# Patient Record
Sex: FEMALE | Race: WHITE | Employment: UNEMPLOYED | URBAN - METROPOLITAN AREA
[De-identification: names, ages, dates, MRNs, and addresses within clinical notes are randomized per-mention and may not be internally consistent; named-entity substitution may affect disease eponyms.]

---

## 2017-01-13 ENCOUNTER — HOSPITAL ENCOUNTER (OUTPATIENT)
Dept: PHARMACY | Age: 67
Discharge: HOME | End: 2017-01-13
Payer: MEDICARE

## 2017-01-13 DIAGNOSIS — I82.91 CHRONIC EMBOLISM AND THROMBOSIS OF VEIN: ICD-10-CM

## 2017-01-13 LAB
INR BLD: 2.8
PROTIME: 33.2 SECONDS

## 2017-01-13 PROCEDURE — 85610 PROTHROMBIN TIME: CPT

## 2017-01-13 PROCEDURE — G0463 HOSPITAL OUTPT CLINIC VISIT: HCPCS

## 2017-02-10 ENCOUNTER — HOSPITAL ENCOUNTER (OUTPATIENT)
Dept: PHARMACY | Age: 67
Discharge: HOME | End: 2017-02-10
Payer: MEDICARE

## 2017-02-10 DIAGNOSIS — I82.91 CHRONIC EMBOLISM AND THROMBOSIS OF VEIN: ICD-10-CM

## 2017-02-10 LAB
INR BLD: 2.8
PROTIME: 34 SECONDS

## 2017-02-10 PROCEDURE — 85610 PROTHROMBIN TIME: CPT

## 2017-02-10 PROCEDURE — G0463 HOSPITAL OUTPT CLINIC VISIT: HCPCS

## 2017-03-15 ENCOUNTER — ANTI-COAG VISIT (OUTPATIENT)
Dept: PHARMACY | Age: 67
End: 2017-03-15

## 2017-03-15 DIAGNOSIS — I82.91 CHRONIC EMBOLISM AND THROMBOSIS OF VEIN: ICD-10-CM

## 2017-03-24 ENCOUNTER — TELEPHONE (OUTPATIENT)
Dept: PHARMACY | Age: 67
End: 2017-03-24

## 2017-03-24 ENCOUNTER — ANTI-COAG VISIT (OUTPATIENT)
Dept: PHARMACY | Age: 67
End: 2017-03-24

## 2017-03-24 DIAGNOSIS — I82.91 CHRONIC EMBOLISM AND THROMBOSIS OF VEIN: ICD-10-CM

## 2017-03-27 ENCOUNTER — TELEPHONE (OUTPATIENT)
Dept: PHARMACY | Age: 67
End: 2017-03-27

## 2017-03-29 ENCOUNTER — TELEPHONE (OUTPATIENT)
Dept: PHARMACY | Age: 67
End: 2017-03-29

## 2017-03-29 ENCOUNTER — ANTI-COAG VISIT (OUTPATIENT)
Dept: PHARMACY | Age: 67
End: 2017-03-29

## 2017-03-29 DIAGNOSIS — I82.91 CHRONIC EMBOLISM AND THROMBOSIS OF VEIN: ICD-10-CM

## 2017-03-31 ENCOUNTER — HOSPITAL ENCOUNTER (OUTPATIENT)
Dept: PHARMACY | Age: 67
Discharge: HOME | End: 2017-03-31
Payer: MEDICARE

## 2017-03-31 DIAGNOSIS — I82.91 CHRONIC EMBOLISM AND THROMBOSIS OF VEIN: ICD-10-CM

## 2017-03-31 LAB
INR BLD: 3.7
PROTIME: 44 SECONDS

## 2017-03-31 PROCEDURE — 85610 PROTHROMBIN TIME: CPT | Performed by: PHARMACIST

## 2017-03-31 PROCEDURE — G0463 HOSPITAL OUTPT CLINIC VISIT: HCPCS | Performed by: PHARMACIST

## 2017-04-14 ENCOUNTER — HOSPITAL ENCOUNTER (OUTPATIENT)
Dept: PHARMACY | Age: 67
Discharge: HOME | End: 2017-04-14
Payer: MEDICARE

## 2017-04-14 DIAGNOSIS — I82.91 CHRONIC EMBOLISM AND THROMBOSIS OF VEIN: ICD-10-CM

## 2017-04-14 LAB
INR BLD: 2.6
PROTIME: 31.2 SECONDS

## 2017-04-14 PROCEDURE — G0463 HOSPITAL OUTPT CLINIC VISIT: HCPCS | Performed by: PHARMACIST

## 2017-04-14 PROCEDURE — 85610 PROTHROMBIN TIME: CPT | Performed by: PHARMACIST

## 2017-04-27 ENCOUNTER — ANTI-COAG VISIT (OUTPATIENT)
Dept: PHARMACY | Age: 67
End: 2017-04-27

## 2017-04-27 DIAGNOSIS — I82.91 CHRONIC EMBOLISM AND THROMBOSIS OF VEIN: ICD-10-CM

## 2017-05-12 ENCOUNTER — HOSPITAL ENCOUNTER (OUTPATIENT)
Dept: PHARMACY | Age: 67
Discharge: HOME | End: 2017-05-12
Payer: MEDICARE

## 2017-05-12 DIAGNOSIS — I82.91 CHRONIC EMBOLISM AND THROMBOSIS OF VEIN: ICD-10-CM

## 2017-05-12 LAB
INR BLD: 4.1
PROTIME: 49.6 SECONDS

## 2017-05-12 PROCEDURE — 85610 PROTHROMBIN TIME: CPT

## 2017-05-12 PROCEDURE — 99211 OFF/OP EST MAY X REQ PHY/QHP: CPT

## 2017-05-26 ENCOUNTER — HOSPITAL ENCOUNTER (OUTPATIENT)
Dept: PHARMACY | Age: 67
Discharge: HOME | End: 2017-05-26
Payer: MEDICARE

## 2017-05-26 DIAGNOSIS — I82.91 CHRONIC EMBOLISM AND THROMBOSIS OF VEIN: ICD-10-CM

## 2017-05-26 LAB
INR BLD: 2.1
PROTIME: 25.6 SECONDS

## 2017-05-26 PROCEDURE — 85610 PROTHROMBIN TIME: CPT

## 2017-05-26 PROCEDURE — 99211 OFF/OP EST MAY X REQ PHY/QHP: CPT

## 2017-06-12 ENCOUNTER — HOSPITAL ENCOUNTER (OUTPATIENT)
Dept: WOUND CARE | Age: 67
Discharge: HOME | End: 2017-06-12
Payer: MEDICARE

## 2017-06-12 VITALS — BODY MASS INDEX: 15.99 KG/M2 | HEIGHT: 65 IN | WEIGHT: 96 LBS

## 2017-06-12 DIAGNOSIS — L97.521 SKIN ULCER OF LEFT GREAT TOE, LIMITED TO BREAKDOWN OF SKIN (HCC): ICD-10-CM

## 2017-06-12 DIAGNOSIS — L03.032 PARONYCHIA OF GREAT TOE, LEFT: ICD-10-CM

## 2017-06-12 DIAGNOSIS — I73.9 PERIPHERAL VASCULAR DISEASE (HCC): ICD-10-CM

## 2017-06-12 PROBLEM — L03.031 PARONYCHIA OF GREAT TOE, RIGHT: Status: ACTIVE | Noted: 2017-06-12

## 2017-06-12 PROBLEM — L97.511 SKIN ULCER OF RIGHT FOOT, LIMITED TO BREAKDOWN OF SKIN (HCC): Status: ACTIVE | Noted: 2017-06-12

## 2017-06-12 PROCEDURE — 99213 OFFICE O/P EST LOW 20 MIN: CPT

## 2017-06-12 RX ORDER — MULTIVIT-MIN/IRON/FOLIC ACID/K 18-600-40
CAPSULE ORAL
COMMUNITY
End: 2019-01-01 | Stop reason: DRUGHIGH

## 2017-06-16 ENCOUNTER — HOSPITAL ENCOUNTER (OUTPATIENT)
Dept: PHARMACY | Age: 67
Setting detail: THERAPIES SERIES
Discharge: HOME | End: 2017-06-16
Payer: MEDICARE

## 2017-06-16 DIAGNOSIS — I82.91 CHRONIC EMBOLISM AND THROMBOSIS OF VEIN: ICD-10-CM

## 2017-06-16 LAB
INR BLD: 2.7
PROTIME: 32.8 SECONDS

## 2017-06-16 PROCEDURE — 85610 PROTHROMBIN TIME: CPT | Performed by: PHARMACIST

## 2017-06-16 PROCEDURE — 99211 OFF/OP EST MAY X REQ PHY/QHP: CPT | Performed by: PHARMACIST

## 2017-06-26 ENCOUNTER — HOSPITAL ENCOUNTER (OUTPATIENT)
Dept: WOUND CARE | Age: 67
Discharge: HOME | End: 2017-06-26
Payer: MEDICARE

## 2017-06-26 VITALS
TEMPERATURE: 96.1 F | RESPIRATION RATE: 16 BRPM | HEART RATE: 64 BPM | SYSTOLIC BLOOD PRESSURE: 136 MMHG | DIASTOLIC BLOOD PRESSURE: 78 MMHG

## 2017-06-26 DIAGNOSIS — L97.522 NEUROPATHIC ULCER OF TOE OF LEFT FOOT WITH FAT LAYER EXPOSED (HCC): ICD-10-CM

## 2017-06-26 PROCEDURE — 11042 DBRDMT SUBQ TIS 1ST 20SQCM/<: CPT

## 2017-06-26 ASSESSMENT — PAIN SCALES - GENERAL: PAINLEVEL_OUTOF10: 4

## 2017-07-14 ENCOUNTER — HOSPITAL ENCOUNTER (OUTPATIENT)
Dept: PHARMACY | Age: 67
Setting detail: THERAPIES SERIES
Discharge: HOME | End: 2017-07-14
Payer: MEDICARE

## 2017-07-14 DIAGNOSIS — I82.91 CHRONIC EMBOLISM AND THROMBOSIS OF VEIN: ICD-10-CM

## 2017-07-14 LAB
INR BLD: 2.8
PROTIME: 33.3 SECONDS

## 2017-07-14 PROCEDURE — 85610 PROTHROMBIN TIME: CPT

## 2017-07-14 PROCEDURE — 99211 OFF/OP EST MAY X REQ PHY/QHP: CPT

## 2017-07-14 RX ORDER — WARFARIN SODIUM 5 MG/1
TABLET ORAL
Qty: 100 TABLET | Refills: 1 | OUTPATIENT
Start: 2017-07-14 | End: 2018-10-25 | Stop reason: SDUPTHER

## 2017-07-17 ENCOUNTER — HOSPITAL ENCOUNTER (OUTPATIENT)
Dept: WOUND CARE | Age: 67
Discharge: HOME | End: 2017-07-17
Payer: MEDICARE

## 2017-07-17 VITALS
SYSTOLIC BLOOD PRESSURE: 134 MMHG | TEMPERATURE: 96.2 F | DIASTOLIC BLOOD PRESSURE: 74 MMHG | HEIGHT: 65 IN | BODY MASS INDEX: 15.99 KG/M2 | WEIGHT: 96 LBS | RESPIRATION RATE: 18 BRPM | HEART RATE: 67 BPM

## 2017-07-17 PROCEDURE — 11042 DBRDMT SUBQ TIS 1ST 20SQCM/<: CPT

## 2017-07-17 ASSESSMENT — PAIN SCALES - GENERAL: PAINLEVEL_OUTOF10: 0

## 2017-08-07 ENCOUNTER — HOSPITAL ENCOUNTER (OUTPATIENT)
Dept: WOUND CARE | Age: 67
Discharge: HOME | End: 2017-08-07
Payer: MEDICARE

## 2017-08-07 VITALS
RESPIRATION RATE: 18 BRPM | SYSTOLIC BLOOD PRESSURE: 131 MMHG | HEART RATE: 73 BPM | TEMPERATURE: 96.3 F | DIASTOLIC BLOOD PRESSURE: 72 MMHG

## 2017-08-07 PROCEDURE — 99212 OFFICE O/P EST SF 10 MIN: CPT

## 2017-08-11 ENCOUNTER — HOSPITAL ENCOUNTER (OUTPATIENT)
Dept: PHARMACY | Age: 67
Setting detail: THERAPIES SERIES
Discharge: HOME | End: 2017-08-11
Payer: MEDICARE

## 2017-08-11 DIAGNOSIS — I82.91 CHRONIC EMBOLISM AND THROMBOSIS OF VEIN: ICD-10-CM

## 2017-08-11 LAB
INR BLD: 3.2
PROTIME: 38.9 SECONDS

## 2017-08-11 PROCEDURE — 85610 PROTHROMBIN TIME: CPT

## 2017-08-11 PROCEDURE — 99211 OFF/OP EST MAY X REQ PHY/QHP: CPT

## 2017-09-08 ENCOUNTER — HOSPITAL ENCOUNTER (OUTPATIENT)
Dept: PHARMACY | Age: 67
Setting detail: THERAPIES SERIES
Discharge: HOME | End: 2017-09-08
Payer: MEDICARE

## 2017-09-08 DIAGNOSIS — I82.91 CHRONIC EMBOLISM AND THROMBOSIS OF VEIN: ICD-10-CM

## 2017-09-08 LAB
INR BLD: 3.5
PROTIME: 41.8 SECONDS

## 2017-09-08 PROCEDURE — 99211 OFF/OP EST MAY X REQ PHY/QHP: CPT | Performed by: PHARMACIST

## 2017-09-08 PROCEDURE — 85610 PROTHROMBIN TIME: CPT | Performed by: PHARMACIST

## 2017-10-02 ENCOUNTER — TELEPHONE (OUTPATIENT)
Dept: PHARMACY | Age: 67
End: 2017-10-02

## 2017-10-04 ENCOUNTER — HOSPITAL ENCOUNTER (OUTPATIENT)
Dept: PHARMACY | Age: 67
Setting detail: THERAPIES SERIES
Discharge: HOME | End: 2017-10-04
Payer: MEDICARE

## 2017-10-04 DIAGNOSIS — I82.91 CHRONIC EMBOLISM AND THROMBOSIS OF VEIN: ICD-10-CM

## 2017-10-04 LAB
INR BLD: 3.5
PROTIME: 42.3 SECONDS

## 2017-10-04 PROCEDURE — 99211 OFF/OP EST MAY X REQ PHY/QHP: CPT

## 2017-10-04 PROCEDURE — 85610 PROTHROMBIN TIME: CPT

## 2017-10-04 NOTE — PROGRESS NOTES
Ms. Mauri Lozoya is a 69 y.o. y/o female with history of Chronic embolism and thrombosis of vein who presents today for anticoagulation monitoring and adjustment.   INR 3.5 is supratherapeutic for this patient (goal range 2-3) and is reflective of 35 mg TWD  Patient verifies current dosing regimen, patient able to verbally recall dose  Patient reports 0 missed doses since last INR   Patient denies s/sx clotting and/or stroke  Patient denies hematuria, epistaxis, rectal bleeding  Patient denies changes in diet, alcohol, or tobacco use  Reviewed medication list and drug allergies with patient, updated any medication additions or modifications accordingly  Patient also denies any pending medical or dental procedures scheduled at this time  Patient is sick, believes she has pneumonia and may be getting antibiotic tomorrow when she sees her doctor  Patient was instructed to hold today's dose, then decrease weekly dose by 7.1% to 32.5 mg TWD and RTC in 2 weeks

## 2017-10-18 ENCOUNTER — HOSPITAL ENCOUNTER (OUTPATIENT)
Dept: PHARMACY | Age: 67
Setting detail: THERAPIES SERIES
Discharge: HOME | End: 2017-10-18
Payer: MEDICARE

## 2017-10-18 DIAGNOSIS — I82.91 CHRONIC EMBOLISM AND THROMBOSIS OF VEIN: ICD-10-CM

## 2017-10-18 LAB
INR BLD: 2.6
PROTIME: 31.5 SECONDS

## 2017-10-18 PROCEDURE — 85610 PROTHROMBIN TIME: CPT | Performed by: PHARMACIST

## 2017-10-18 PROCEDURE — 99211 OFF/OP EST MAY X REQ PHY/QHP: CPT | Performed by: PHARMACIST

## 2017-11-01 ENCOUNTER — HOSPITAL ENCOUNTER (OUTPATIENT)
Dept: PHARMACY | Age: 67
Setting detail: THERAPIES SERIES
Discharge: HOME | End: 2017-11-01
Payer: MEDICARE

## 2017-11-01 DIAGNOSIS — I82.91 CHRONIC EMBOLISM AND THROMBOSIS OF VEIN: ICD-10-CM

## 2017-11-01 LAB
INR BLD: 2
PROTIME: 24.2 SECONDS

## 2017-11-01 PROCEDURE — 99211 OFF/OP EST MAY X REQ PHY/QHP: CPT | Performed by: PHARMACIST

## 2017-11-01 PROCEDURE — 85610 PROTHROMBIN TIME: CPT | Performed by: PHARMACIST

## 2017-11-09 ENCOUNTER — OFFICE VISIT (OUTPATIENT)
Dept: GASTROENTEROLOGY | Age: 67
End: 2017-11-09

## 2017-11-09 ENCOUNTER — HOSPITAL ENCOUNTER (OUTPATIENT)
Dept: WOMENS IMAGING | Age: 67
Discharge: HOME | End: 2017-11-09
Payer: MEDICARE

## 2017-11-09 VITALS
BODY MASS INDEX: 16.31 KG/M2 | DIASTOLIC BLOOD PRESSURE: 84 MMHG | HEART RATE: 64 BPM | SYSTOLIC BLOOD PRESSURE: 134 MMHG | WEIGHT: 98 LBS

## 2017-11-09 DIAGNOSIS — Z12.11 SPECIAL SCREENING FOR MALIGNANT NEOPLASMS, COLON: ICD-10-CM

## 2017-11-09 DIAGNOSIS — R68.81 EARLY SATIETY: Primary | ICD-10-CM

## 2017-11-09 DIAGNOSIS — R63.4 WEIGHT LOSS: ICD-10-CM

## 2017-11-09 DIAGNOSIS — Z79.01 LONG-TERM (CURRENT) USE OF ANTICOAGULANTS: ICD-10-CM

## 2017-11-09 DIAGNOSIS — Z79.82 ENCOUNTER FOR LONG-TERM (CURRENT) USE OF ASPIRIN: ICD-10-CM

## 2017-11-09 DIAGNOSIS — Z12.31 ENCOUNTER FOR SCREENING MAMMOGRAM FOR BREAST CANCER: ICD-10-CM

## 2017-11-09 PROCEDURE — 99203 OFFICE O/P NEW LOW 30 MIN: CPT | Performed by: INTERNAL MEDICINE

## 2017-11-09 PROCEDURE — 77063 BREAST TOMOSYNTHESIS BI: CPT

## 2017-11-14 NOTE — PROGRESS NOTES
lesions. Warm and dry. Neuro: Grossly intact. Cranial nerves, sensation and reflexes grossly intact. Musculoskeletal:  Unremarkable. Assessment:  1. Early satiety     2. Weight loss     3. Special screening for malignant neoplasms, colon     4. Long-term (current) use of anticoagulants     5. Encounter for long-term (current) use of aspirin         Plan:  Colonoscopy and EGD with small bowel biopsy 11/15/17  Patient has been instructed to hold aspirin for 2 days prior to the procedure. Patient has been instructed to hold Coumadin for 3 days prior to the procedure. Prep:  Keisha Roger, transcribed the above note for Dr Liang Hernandez. Electronically signed by Rebel Santos 11/14/17 10:51 AM        Liang NORMAN, have read and agree with the above documentation.   Electronically signed by Liang Hernandez M.D. 11/15/17 10:26 AM

## 2017-11-15 ENCOUNTER — ANESTHESIA EVENT (OUTPATIENT)
Dept: ENDOSCOPY | Age: 67
End: 2017-11-15
Payer: MEDICARE

## 2017-11-15 ENCOUNTER — ANESTHESIA (OUTPATIENT)
Dept: ENDOSCOPY | Age: 67
End: 2017-11-15
Payer: MEDICARE

## 2017-11-15 ENCOUNTER — HOSPITAL ENCOUNTER (OUTPATIENT)
Age: 67
Setting detail: OUTPATIENT SURGERY
Discharge: HOME | End: 2017-11-15
Attending: INTERNAL MEDICINE | Admitting: INTERNAL MEDICINE
Payer: MEDICARE

## 2017-11-15 VITALS
DIASTOLIC BLOOD PRESSURE: 69 MMHG | SYSTOLIC BLOOD PRESSURE: 118 MMHG | RESPIRATION RATE: 25 BRPM | OXYGEN SATURATION: 96 %

## 2017-11-15 VITALS
HEIGHT: 65 IN | RESPIRATION RATE: 16 BRPM | HEART RATE: 81 BPM | DIASTOLIC BLOOD PRESSURE: 68 MMHG | SYSTOLIC BLOOD PRESSURE: 107 MMHG | TEMPERATURE: 99.6 F | BODY MASS INDEX: 16.33 KG/M2 | WEIGHT: 98 LBS | OXYGEN SATURATION: 95 %

## 2017-11-15 PROCEDURE — 88342 IMHCHEM/IMCYTCHM 1ST ANTB: CPT

## 2017-11-15 PROCEDURE — 3609017100 HC EGD: Performed by: INTERNAL MEDICINE

## 2017-11-15 PROCEDURE — 7100000010 HC PHASE II RECOVERY - FIRST 15 MIN: Performed by: INTERNAL MEDICINE

## 2017-11-15 PROCEDURE — 3700000000 HC ANESTHESIA ATTENDED CARE: Performed by: INTERNAL MEDICINE

## 2017-11-15 PROCEDURE — 3700000001 HC ADD 15 MINUTES (ANESTHESIA): Performed by: INTERNAL MEDICINE

## 2017-11-15 PROCEDURE — 88305 TISSUE EXAM BY PATHOLOGIST: CPT

## 2017-11-15 PROCEDURE — 2580000003 HC RX 258: Performed by: INTERNAL MEDICINE

## 2017-11-15 PROCEDURE — 3609027000 HC COLONOSCOPY: Performed by: INTERNAL MEDICINE

## 2017-11-15 PROCEDURE — 6360000002 HC RX W HCPCS: Performed by: NURSE ANESTHETIST, CERTIFIED REGISTERED

## 2017-11-15 RX ORDER — ONDANSETRON 2 MG/ML
4 INJECTION INTRAMUSCULAR; INTRAVENOUS
Status: DISCONTINUED | OUTPATIENT
Start: 2017-11-15 | End: 2017-11-15 | Stop reason: HOSPADM

## 2017-11-15 RX ORDER — PROPOFOL 10 MG/ML
INJECTION, EMULSION INTRAVENOUS PRN
Status: DISCONTINUED | OUTPATIENT
Start: 2017-11-15 | End: 2017-11-15 | Stop reason: SDUPTHER

## 2017-11-15 RX ORDER — FLUTICASONE FUROATE AND VILANTEROL 100; 25 UG/1; UG/1
POWDER RESPIRATORY (INHALATION) DAILY
COMMUNITY

## 2017-11-15 RX ORDER — SODIUM CHLORIDE 9 MG/ML
INJECTION, SOLUTION INTRAVENOUS CONTINUOUS
Status: DISCONTINUED | OUTPATIENT
Start: 2017-11-15 | End: 2017-11-15 | Stop reason: HOSPADM

## 2017-11-15 RX ADMIN — PROPOFOL 50 MG: 10 INJECTION, EMULSION INTRAVENOUS at 11:51

## 2017-11-15 RX ADMIN — PROPOFOL 20 MG: 10 INJECTION, EMULSION INTRAVENOUS at 11:57

## 2017-11-15 RX ADMIN — PROPOFOL 20 MG: 10 INJECTION, EMULSION INTRAVENOUS at 11:55

## 2017-11-15 RX ADMIN — SODIUM CHLORIDE: 900 INJECTION, SOLUTION INTRAVENOUS at 11:31

## 2017-11-15 RX ADMIN — PROPOFOL 20 MG: 10 INJECTION, EMULSION INTRAVENOUS at 12:10

## 2017-11-15 RX ADMIN — PROPOFOL 20 MG: 10 INJECTION, EMULSION INTRAVENOUS at 11:59

## 2017-11-15 RX ADMIN — SODIUM CHLORIDE: 900 INJECTION, SOLUTION INTRAVENOUS at 12:10

## 2017-11-15 RX ADMIN — PROPOFOL 20 MG: 10 INJECTION, EMULSION INTRAVENOUS at 12:13

## 2017-11-15 RX ADMIN — PROPOFOL 20 MG: 10 INJECTION, EMULSION INTRAVENOUS at 12:03

## 2017-11-15 RX ADMIN — PROPOFOL 20 MG: 10 INJECTION, EMULSION INTRAVENOUS at 12:00

## 2017-11-15 RX ADMIN — PROPOFOL 20 MG: 10 INJECTION, EMULSION INTRAVENOUS at 11:53

## 2017-11-15 RX ADMIN — PROPOFOL 20 MG: 10 INJECTION, EMULSION INTRAVENOUS at 12:05

## 2017-11-15 RX ADMIN — PROPOFOL 20 MG: 10 INJECTION, EMULSION INTRAVENOUS at 12:08

## 2017-11-15 ASSESSMENT — PAIN - FUNCTIONAL ASSESSMENT: PAIN_FUNCTIONAL_ASSESSMENT: 0-10

## 2017-11-15 NOTE — ANESTHESIA POSTPROCEDURE EVALUATION
Department of Anesthesiology  Postprocedure Note    Patient: Shazia Cabrera  MRN: [id number]  YOB: 1950  Date of evaluation: 11/15/2017  Time:  12:24 PM     Procedure Summary     Date:  11/15/17 Room / Location:  [address]    Anesthesia Start:  4262 Anesthesia Stop:  3559    Procedures:       EGD ESOPHAGOGASTRODUODENOSCOPY WITH SMALL BOWEL BIOPSY (N/A )      COLONOSCOPY (N/A ) Diagnosis:  (SCREENING, WT LOSS, EARLY SAT Z12.11, R63.4, R68.81 (, 21709))    Surgeon:  Liang Hernandez MD Responsible Provider:  Gregg Sim CRNA    Anesthesia Type:  MAC ASA Status:  2          Anesthesia Type: MAC    Luz Phase I:      Luz Phase II:      Last vitals: Reviewed and per EMR flowsheets.        Anesthesia Post Evaluation    Patient location during evaluation: PACU  Patient participation: complete - patient participated  Level of consciousness: awake and alert  Pain score: 0  Airway patency: patent  Nausea & Vomiting: no nausea and no vomiting  Complications: no  Cardiovascular status: blood pressure returned to baseline and hemodynamically stable  Respiratory status: acceptable  Hydration status: euvolemic

## 2017-11-15 NOTE — ANESTHESIA PRE PROCEDURE
11/15/17                        Date of last solid food consumption: 11/14/17    BMI:   Wt Readings from Last 3 Encounters:   11/15/17 98 lb (44.5 kg)   11/09/17 98 lb (44.5 kg)   07/17/17 96 lb (43.5 kg)     Body mass index is 16.31 kg/m². CBC:   Lab Results   Component Value Date    WBC 4.4 11/08/2017    RBC 4.91 11/08/2017    HGB 14.7 11/08/2017    HCT 45.5 11/08/2017    MCV 92.9 11/08/2017    RDW 16.7 11/08/2017     11/08/2017       CMP:   Lab Results   Component Value Date     11/08/2017    K 4.7 11/08/2017     11/08/2017    CO2 26 11/08/2017    BUN 28 11/08/2017    CREATININE 0.86 11/08/2017    GFRAA >60.0 11/08/2017    LABGLOM >60.0 11/08/2017    GLUCOSE 93 11/08/2017    PROT 7.4 11/08/2017    CALCIUM 9.2 11/08/2017    BILITOT 0.2 11/08/2017    ALKPHOS 72 11/08/2017    AST 21 11/08/2017    ALT 18 11/08/2017       POC Tests: No results for input(s): POCGLU, POCNA, POCK, POCCL, POCBUN, POCHEMO, POCHCT in the last 72 hours.     Coags:   Lab Results   Component Value Date    PROTIME 24.2 11/01/2017    PROTIME 24.8 09/22/2016    INR 2 11/01/2017    APTT 52.3 05/24/2016       HCG (If Applicable): No results found for: PREGTESTUR, PREGSERUM, HCG, HCGQUANT     ABGs:   Lab Results   Component Value Date    PHART 7.381 07/18/2015    PO2ART 61 07/18/2015    QVF1LEU 45 07/18/2015    PJR6BGW 26.9 07/18/2015    BEART 1 07/18/2015    K8TKCEAQ 90 07/18/2015        Type & Screen (If Applicable):  No results found for: LABABO, [address]    Anesthesia Evaluation  Patient summary reviewed and Nursing notes reviewed  Airway: Mallampati: II  TM distance: >3 FB   Neck ROM: full  Mouth opening: > = 3 FB Dental: normal exam         Pulmonary:normal exam    (+) COPD:  asthma:                            Cardiovascular:Negative CV ROS                      Neuro/Psych:   Negative Neuro/Psych ROS              GI/Hepatic/Renal: Neg GI/Hepatic/Renal ROS            Endo/Other:    (+) hyperthyroidism::., .

## 2017-11-21 ENCOUNTER — HOSPITAL ENCOUNTER (OUTPATIENT)
Dept: ULTRASOUND IMAGING | Age: 67
End: 2017-11-21
Payer: MEDICARE

## 2017-11-21 ENCOUNTER — HOSPITAL ENCOUNTER (OUTPATIENT)
Dept: WOMENS IMAGING | Age: 67
Discharge: HOME | End: 2017-11-21
Payer: MEDICARE

## 2017-11-21 DIAGNOSIS — R92.8 ABNORMAL MAMMOGRAM: ICD-10-CM

## 2017-11-21 PROCEDURE — G0206 DX MAMMO INCL CAD UNI: HCPCS

## 2017-11-27 ENCOUNTER — HOSPITAL ENCOUNTER (OUTPATIENT)
Dept: PHARMACY | Age: 67
Setting detail: THERAPIES SERIES
Discharge: HOME | End: 2017-11-27
Payer: MEDICARE

## 2017-11-27 DIAGNOSIS — I82.91 CHRONIC EMBOLISM AND THROMBOSIS OF VEIN: ICD-10-CM

## 2017-11-27 LAB
INR BLD: 2.5
PROTIME: 30 SECONDS

## 2017-11-27 PROCEDURE — 99211 OFF/OP EST MAY X REQ PHY/QHP: CPT

## 2017-11-27 PROCEDURE — 85610 PROTHROMBIN TIME: CPT

## 2017-11-27 NOTE — PROGRESS NOTES
Ms. Triston Bernstein is a 69 y.o. y/o female with history of chronic embolism and thrombosis who presents today for anticoagulation monitoring and adjustment.   INR 2.5 is therapeutic for this patient (goal range 2-3) and is reflective of 32.5 mg TWD  Patient verifies current dosing regimen, patient able to verbally recall dose  Patient reports 0  missed doses since last INR   Patient denies s/sx clotting and/or stroke  Patient denies hematuria, epistaxis, rectal bleeding  Patient denies changes in diet, alcohol, or tobacco use  Reviewed medication list and drug allergies with patient, updated any medication additions or modifications accordingly  Patient also denies any pending medical or dental procedures scheduled at this time  Patient was instructed to continue 32.5 mg TWD and RTC 3 weeks

## 2017-12-18 ENCOUNTER — HOSPITAL ENCOUNTER (OUTPATIENT)
Dept: PHARMACY | Age: 67
Setting detail: THERAPIES SERIES
Discharge: HOME | End: 2017-12-18
Payer: MEDICARE

## 2017-12-18 DIAGNOSIS — I82.91 CHRONIC EMBOLISM AND THROMBOSIS OF VEIN: ICD-10-CM

## 2017-12-18 LAB
INR BLD: 2.5
PROTIME: 29.4 SECONDS

## 2017-12-18 PROCEDURE — 85610 PROTHROMBIN TIME: CPT

## 2017-12-18 PROCEDURE — 99211 OFF/OP EST MAY X REQ PHY/QHP: CPT

## 2018-01-15 ENCOUNTER — TELEPHONE (OUTPATIENT)
Dept: PHARMACY | Age: 68
End: 2018-01-15

## 2018-01-15 ENCOUNTER — APPOINTMENT (OUTPATIENT)
Dept: PHARMACY | Age: 68
End: 2018-01-15
Payer: MEDICARE

## 2018-01-15 DIAGNOSIS — I82.91 CHRONIC EMBOLISM AND THROMBOSIS OF VEIN: ICD-10-CM

## 2018-01-23 ENCOUNTER — HOSPITAL ENCOUNTER (OUTPATIENT)
Dept: PHARMACY | Age: 68
Setting detail: THERAPIES SERIES
Discharge: HOME | End: 2018-01-23
Payer: MEDICARE

## 2018-01-23 DIAGNOSIS — I82.91 CHRONIC EMBOLISM AND THROMBOSIS OF VEIN: ICD-10-CM

## 2018-01-23 LAB
INR BLD: 2.9
PROTIME: 35.2 SECONDS

## 2018-01-23 PROCEDURE — 99211 OFF/OP EST MAY X REQ PHY/QHP: CPT | Performed by: PHARMACIST

## 2018-01-23 PROCEDURE — 85610 PROTHROMBIN TIME: CPT | Performed by: PHARMACIST

## 2018-02-20 ENCOUNTER — TELEPHONE (OUTPATIENT)
Dept: PHARMACY | Age: 68
End: 2018-02-20

## 2018-02-20 DIAGNOSIS — I82.91 CHRONIC EMBOLISM AND THROMBOSIS OF VEIN: ICD-10-CM

## 2018-02-28 ENCOUNTER — TELEPHONE (OUTPATIENT)
Dept: PHARMACY | Age: 68
End: 2018-02-28

## 2018-02-28 DIAGNOSIS — I82.91 CHRONIC EMBOLISM AND THROMBOSIS OF VEIN: ICD-10-CM

## 2018-03-07 ENCOUNTER — TELEPHONE (OUTPATIENT)
Dept: PHARMACY | Age: 68
End: 2018-03-07

## 2018-03-09 ENCOUNTER — HOSPITAL ENCOUNTER (OUTPATIENT)
Dept: PHARMACY | Age: 68
Setting detail: THERAPIES SERIES
Discharge: HOME | End: 2018-03-09
Payer: MEDICARE

## 2018-03-09 DIAGNOSIS — I82.91 CHRONIC EMBOLISM AND THROMBOSIS OF VEIN: ICD-10-CM

## 2018-03-09 LAB
INR BLD: 2.9
PROTIME: 34.3 SECONDS

## 2018-03-09 PROCEDURE — 85610 PROTHROMBIN TIME: CPT | Performed by: PHARMACIST

## 2018-03-09 PROCEDURE — 99211 OFF/OP EST MAY X REQ PHY/QHP: CPT | Performed by: PHARMACIST

## 2018-03-16 ENCOUNTER — HOSPITAL ENCOUNTER (EMERGENCY)
Age: 68
Discharge: HOME | End: 2018-03-16
Attending: EMERGENCY MEDICINE
Payer: MEDICARE

## 2018-03-16 ENCOUNTER — APPOINTMENT (OUTPATIENT)
Dept: GENERAL RADIOLOGY | Age: 68
End: 2018-03-16
Payer: MEDICARE

## 2018-03-16 VITALS
RESPIRATION RATE: 14 BRPM | DIASTOLIC BLOOD PRESSURE: 68 MMHG | WEIGHT: 96 LBS | HEART RATE: 76 BPM | HEIGHT: 65 IN | BODY MASS INDEX: 15.99 KG/M2 | TEMPERATURE: 97.3 F | OXYGEN SATURATION: 100 % | SYSTOLIC BLOOD PRESSURE: 101 MMHG

## 2018-03-16 DIAGNOSIS — R19.7 DIARRHEA, UNSPECIFIED TYPE: Primary | ICD-10-CM

## 2018-03-16 DIAGNOSIS — J44.1 COPD EXACERBATION (HCC): ICD-10-CM

## 2018-03-16 LAB
ALBUMIN SERPL-MCNC: 2.6 G/DL (ref 3.9–4.9)
ALP BLD-CCNC: 68 U/L (ref 40–130)
ALT SERPL-CCNC: 16 U/L (ref 0–33)
ANION GAP SERPL CALCULATED.3IONS-SCNC: 10 MEQ/L (ref 7–13)
AST SERPL-CCNC: 36 U/L (ref 0–35)
BACTERIA: NORMAL /HPF
BASOPHILS ABSOLUTE: 0.1 K/UL (ref 0–0.2)
BASOPHILS RELATIVE PERCENT: 1.3 %
BILIRUB SERPL-MCNC: 0.3 MG/DL (ref 0–1.2)
BILIRUBIN DIRECT: 0.2 MG/DL (ref 0–0.3)
BILIRUBIN URINE: ABNORMAL
BILIRUBIN, INDIRECT: 0.1 MG/DL (ref 0–0.6)
BLOOD, URINE: ABNORMAL
BUN BLDV-MCNC: 32 MG/DL (ref 8–23)
CALCIUM SERPL-MCNC: 8.3 MG/DL (ref 8.6–10.2)
CASTS: NORMAL /LPF
CHLORIDE BLD-SCNC: 97 MEQ/L (ref 98–107)
CLARITY: CLEAR
CO2: 30 MEQ/L (ref 22–29)
COLOR: ABNORMAL
CREAT SERPL-MCNC: 1.09 MG/DL (ref 0.5–0.9)
EOSINOPHILS ABSOLUTE: 0 K/UL (ref 0–0.7)
EOSINOPHILS RELATIVE PERCENT: 0 %
EPITHELIAL CELLS, UA: NORMAL /HPF
GFR AFRICAN AMERICAN: >60
GFR NON-AFRICAN AMERICAN: 49.6
GLUCOSE BLD-MCNC: 113 MG/DL (ref 74–109)
GLUCOSE URINE: 100 MG/DL
HCT VFR BLD CALC: 44.9 % (ref 37–47)
HEMOGLOBIN: 15.5 G/DL (ref 12–16)
KETONES, URINE: ABNORMAL MG/DL
LACTIC ACID: 1.4 MMOL/L (ref 0.5–2.2)
LEUKOCYTE ESTERASE, URINE: NEGATIVE
LIPASE: 32 U/L (ref 13–60)
LYMPHOCYTES ABSOLUTE: 0.8 K/UL (ref 1–4.8)
LYMPHOCYTES RELATIVE PERCENT: 16.5 %
MAGNESIUM: 2.1 MG/DL (ref 1.7–2.3)
MCH RBC QN AUTO: 32.6 PG (ref 27–31.3)
MCHC RBC AUTO-ENTMCNC: 34.4 % (ref 33–37)
MCV RBC AUTO: 94.6 FL (ref 82–100)
MONOCYTES ABSOLUTE: 0.5 K/UL (ref 0.2–0.8)
MONOCYTES RELATIVE PERCENT: 9.6 %
MUCUS: PRESENT
NEUTROPHILS ABSOLUTE: 3.7 K/UL (ref 1.4–6.5)
NEUTROPHILS RELATIVE PERCENT: 72.6 %
NITRITE, URINE: NEGATIVE
PDW BLD-RTO: 16.4 % (ref 11.5–14.5)
PH UA: 6 (ref 5–9)
PLATELET # BLD: 288 K/UL (ref 130–400)
POTASSIUM SERPL-SCNC: 4.2 MEQ/L (ref 3.5–5.1)
PROTEIN UA: >=300 MG/DL
RAPID INFLUENZA  B AGN: NEGATIVE
RAPID INFLUENZA A AGN: NEGATIVE
RBC # BLD: 4.75 M/UL (ref 4.2–5.4)
RBC UA: NORMAL /HPF (ref 0–2)
SODIUM BLD-SCNC: 137 MEQ/L (ref 132–144)
SPECIFIC GRAVITY UA: 1.03 (ref 1–1.03)
TOTAL PROTEIN: 6.6 G/DL (ref 6.4–8.1)
URINE REFLEX TO CULTURE: YES
UROBILINOGEN, URINE: 0.2 E.U./DL
WBC # BLD: 5.1 K/UL (ref 4.8–10.8)
WBC UA: NORMAL /HPF (ref 0–5)

## 2018-03-16 PROCEDURE — 83605 ASSAY OF LACTIC ACID: CPT

## 2018-03-16 PROCEDURE — 80048 BASIC METABOLIC PNL TOTAL CA: CPT

## 2018-03-16 PROCEDURE — 94640 AIRWAY INHALATION TREATMENT: CPT

## 2018-03-16 PROCEDURE — 6370000000 HC RX 637 (ALT 250 FOR IP): Performed by: EMERGENCY MEDICINE

## 2018-03-16 PROCEDURE — 6360000002 HC RX W HCPCS: Performed by: EMERGENCY MEDICINE

## 2018-03-16 PROCEDURE — 87086 URINE CULTURE/COLONY COUNT: CPT

## 2018-03-16 PROCEDURE — 96361 HYDRATE IV INFUSION ADD-ON: CPT

## 2018-03-16 PROCEDURE — 96374 THER/PROPH/DIAG INJ IV PUSH: CPT

## 2018-03-16 PROCEDURE — 81001 URINALYSIS AUTO W/SCOPE: CPT

## 2018-03-16 PROCEDURE — 83735 ASSAY OF MAGNESIUM: CPT

## 2018-03-16 PROCEDURE — 36415 COLL VENOUS BLD VENIPUNCTURE: CPT

## 2018-03-16 PROCEDURE — 86403 PARTICLE AGGLUT ANTBDY SCRN: CPT

## 2018-03-16 PROCEDURE — 85025 COMPLETE CBC W/AUTO DIFF WBC: CPT

## 2018-03-16 PROCEDURE — 99284 EMERGENCY DEPT VISIT MOD MDM: CPT

## 2018-03-16 PROCEDURE — 87040 BLOOD CULTURE FOR BACTERIA: CPT

## 2018-03-16 PROCEDURE — 2580000003 HC RX 258: Performed by: EMERGENCY MEDICINE

## 2018-03-16 PROCEDURE — 71045 X-RAY EXAM CHEST 1 VIEW: CPT

## 2018-03-16 PROCEDURE — 83690 ASSAY OF LIPASE: CPT

## 2018-03-16 PROCEDURE — 80076 HEPATIC FUNCTION PANEL: CPT

## 2018-03-16 RX ORDER — AZITHROMYCIN 250 MG/1
TABLET, FILM COATED ORAL
Qty: 1 PACKET | Refills: 0 | Status: ON HOLD | OUTPATIENT
Start: 2018-03-16 | End: 2019-01-01 | Stop reason: HOSPADM

## 2018-03-16 RX ORDER — IPRATROPIUM BROMIDE AND ALBUTEROL SULFATE 2.5; .5 MG/3ML; MG/3ML
1 SOLUTION RESPIRATORY (INHALATION) PRN
Status: DISCONTINUED | OUTPATIENT
Start: 2018-03-16 | End: 2018-03-16 | Stop reason: HOSPADM

## 2018-03-16 RX ORDER — 0.9 % SODIUM CHLORIDE 0.9 %
500 INTRAVENOUS SOLUTION INTRAVENOUS ONCE
Status: COMPLETED | OUTPATIENT
Start: 2018-03-16 | End: 2018-03-16

## 2018-03-16 RX ORDER — SODIUM CHLORIDE 9 MG/ML
INJECTION, SOLUTION INTRAVENOUS CONTINUOUS
Status: DISCONTINUED | OUTPATIENT
Start: 2018-03-16 | End: 2018-03-16 | Stop reason: HOSPADM

## 2018-03-16 RX ORDER — PREDNISONE 20 MG/1
40 TABLET ORAL DAILY
Qty: 10 TABLET | Refills: 0 | Status: SHIPPED | OUTPATIENT
Start: 2018-03-16 | End: 2018-03-21

## 2018-03-16 RX ORDER — METHYLPREDNISOLONE SODIUM SUCCINATE 125 MG/2ML
125 INJECTION, POWDER, LYOPHILIZED, FOR SOLUTION INTRAMUSCULAR; INTRAVENOUS ONCE
Status: COMPLETED | OUTPATIENT
Start: 2018-03-16 | End: 2018-03-16

## 2018-03-16 RX ADMIN — SODIUM CHLORIDE 500 ML: 9 INJECTION, SOLUTION INTRAVENOUS at 14:02

## 2018-03-16 RX ADMIN — IPRATROPIUM BROMIDE AND ALBUTEROL SULFATE 1 AMPULE: .5; 3 SOLUTION RESPIRATORY (INHALATION) at 13:34

## 2018-03-16 RX ADMIN — METHYLPREDNISOLONE SODIUM SUCCINATE 125 MG: 125 INJECTION, POWDER, FOR SOLUTION INTRAMUSCULAR; INTRAVENOUS at 14:02

## 2018-03-16 RX ADMIN — SODIUM CHLORIDE: 9 INJECTION, SOLUTION INTRAVENOUS at 14:03

## 2018-03-16 RX ADMIN — IPRATROPIUM BROMIDE AND ALBUTEROL SULFATE 1 AMPULE: .5; 3 SOLUTION RESPIRATORY (INHALATION) at 13:27

## 2018-03-16 ASSESSMENT — ENCOUNTER SYMPTOMS
SHORTNESS OF BREATH: 1
EYE PAIN: 0
DIARRHEA: 1
CHEST TIGHTNESS: 0
VOMITING: 0
COUGH: 1
SORE THROAT: 0
ABDOMINAL PAIN: 0
WHEEZING: 1
NAUSEA: 0

## 2018-03-16 ASSESSMENT — PAIN SCALES - GENERAL: PAINLEVEL_OUTOF10: 5

## 2018-03-16 ASSESSMENT — PAIN DESCRIPTION - LOCATION: LOCATION: LEG

## 2018-03-16 ASSESSMENT — PAIN DESCRIPTION - ORIENTATION: ORIENTATION: LEFT

## 2018-03-16 ASSESSMENT — PAIN DESCRIPTION - PAIN TYPE: TYPE: ACUTE PAIN

## 2018-03-16 NOTE — ED PROVIDER NOTES
[facility]  eMERGENCY dEPARTMENT eNCOUnter      Pt Name: Esther Hammond  MRN: [id number]  [place] 1950  Date of evaluation: 3/16/2018  Provider: Kalyani Shaikh, [address] [place] [address]       Chief Complaint   Patient presents with    Diarrhea     shaking x 2 weeks, diarrhea x 2 days, confusion x 2 days. Stomach flu went through the house recently. HISTORY OF PRESENT ILLNESS   (Location/Symptom, Timing/Onset, Context/Setting, Quality, Duration, Modifying Factors, Severity)  Note limiting factors. Esther Hammond is a 77 y.o. female who presents to the emergency department . Patient presents with poor appetite, shaking for 2 weeks. For the last couple of days she has had diarrhea and confusion. She also has a productive cough and is short of breath. Her family states that she's always coughing and has COPD. She has had pneumonia several times. HPI    Nursing Notes were reviewed. REVIEW OF SYSTEMS    (2-9 systems for level 4, 10 or more for level 5)     Review of Systems   Constitutional: Positive for activity change and appetite change. Negative for fatigue and fever. HENT: Positive for congestion. Negative for sore throat. Eyes: Negative for pain and visual disturbance. Respiratory: Positive for cough, shortness of breath and wheezing. Negative for chest tightness. Cardiovascular: Negative for chest pain. Gastrointestinal: Positive for diarrhea. Negative for abdominal pain, nausea and vomiting. Endocrine: Negative for polydipsia. Genitourinary: Negative for flank pain and urgency. Musculoskeletal: Negative for gait problem and neck stiffness. Skin: Negative for rash. Neurological: Negative for weakness, light-headedness and headaches. Psychiatric/Behavioral: Positive for confusion. Negative for sleep disturbance. Except as noted above the remainder of the review of systems was reviewed and negative.        PAST MEDICAL HISTORY     Past Medical History: Diagnosis Date    COPD (chronic obstructive pulmonary disease) ([facility] [address]     Hx of blood clots     Hyperlipidemia     Hyperthyroidism          SURGICAL HISTORY       Past Surgical History:   Procedure Laterality Date    DENTAL SURGERY      HYSTERECTOMY      [place] COLON CA SCRN NOT  [address] IND N/A 11/15/2017    COLONOSCOPY performed by Estrellita Arana MD at . [address] ESOPHAGOGASTRODUODENOSCOPY TRANSORAL DIAGNOSTIC N/A 11/15/2017    EGD ESOPHAGOGASTRODUODENOSCOPY WITH SMALL BOWEL BIOPSY performed by Estrellita Arana MD at [facility],[address]      L leg DVT         CURRENT MEDICATIONS       Previous Medications    ALBUTEROL (PROAIR HFA) 108 (90 BASE) MCG/ACT INHALER    Inhale 2 puffs into the lungs every 6 hours as needed for Wheezing. ALBUTEROL (PROVENTIL) (2.5 MG/3ML) 0.083% NEBULIZER SOLUTION    Take 2.5 mg by nebulization every 6 hours as needed for Wheezing. AMLODIPINE (NORVASC) 10 MG TABLET    Take 10 mg by mouth daily. ASPIRIN 81 MG TABLET    Take 81 mg by mouth daily. ATENOLOL (TENORMIN) 50 MG TABLET    Take 50 mg by mouth daily. CHOLECALCIFEROL (VITAMIN D) 2000 UNITS CAPS CAPSULE    Take by mouth Indications: 89946 units Thursday only    CLOPIDOGREL (PLAVIX) 75 MG TABLET    Take 75 mg by mouth daily. FLUTICASONE-VILANTEROL (BREO ELLIPTA) 100-25 MCG/INH AEPB INHALER    Inhale into the lungs daily    LORATADINE (CLARITIN) 10 MG TABLET    Take 10 mg by mouth daily. ROSUVASTATIN (CRESTOR) 20 MG TABLET    Take 20 mg by mouth daily. TRIAMCINOLONE (ARISTOCORT) 0.5 % CREAM    Apply  topically 3 times daily. Apply topically 3 times daily. UMECLIDINIUM BROMIDE 62.5 MCG/INH AEPB    Inhale into the lungs    WARFARIN (COUMADIN) 5 MG TABLET    Take as directed by [facility] EMERGENCY [facility] AT [facility]. Quantity equals 90 day supply. WARFARIN (COUMADIN) 6 MG TABLET    Take 6 mg by mouth daily.        ALLERGIES     Patient has no known is not diaphoretic. Psychiatric: She has a normal mood and affect. Her behavior is normal. Judgment and thought content normal.       DIAGNOSTIC RESULTS     EKG: All EKG's are interpreted by the Emergency Department Physician who either signs or Co-signs this chart in the absence of a cardiologist.        RADIOLOGY:   Non-plain film images such as CT, Ultrasound and MRI are read by the radiologist. Plain radiographic images are visualized and preliminarily interpreted by the emergency physician with the below findings:    Chest x-ray shows no infiltrate. Evidence of COPD    Interpretation per the Radiologist below, if available at the time of this note:    XR CHEST PORTABLE    (Results Pending)         ED BEDSIDE ULTRASOUND:   Performed by ED Physician - none    LABS:  Labs Reviewed   C DIFF TOXIN B BY RT PCR   RAPID INFLUENZA A/B ANTIGENS   CULTURE BLOOD #2   CULTURE BLOOD #1   HEPATIC FUNCTION PANEL   CBC WITH AUTO DIFFERENTIAL   BASIC METABOLIC PANEL   URINE RT REFLEX TO CULTURE   MAGNESIUM   LIPASE   LACTIC ACID, PLASMA       All other labs were within normal range or not returned as of this dictation. EMERGENCY DEPARTMENT COURSE and DIFFERENTIAL DIAGNOSIS/MDM:   Vitals:    Vitals:    03/16/18 1318 03/16/18 1319 03/16/18 1329 03/16/18 1335   BP:       Pulse:   81 76   Resp:   18 14   Temp:       TempSrc:       SpO2: (!) 88% 100% 100% 100%   Weight:       Height:           Patient presented with shortness of breath. After DuoNeb's her lungs sounded much better. Chest x-ray did not show any infiltrates. She has had a poor appetite recently and some diarrhea. I did give her IV hydration and general she is feeling better. She was less shaky. She is able to eat and drink. Patient will be discharged home    MDM      REASSESSMENT     ED Course          CRITICAL CARE TIME   Total Critical Care time was 0 minutes, excluding separately reportable procedures.   There was a high probability of clinically

## 2018-03-18 LAB — URINE CULTURE, ROUTINE: NORMAL

## 2018-03-21 LAB
BLOOD CULTURE, ROUTINE: NORMAL
CULTURE, BLOOD 2: NORMAL

## 2018-04-01 ENCOUNTER — APPOINTMENT (OUTPATIENT)
Dept: ULTRASOUND IMAGING | Age: 68
End: 2018-04-01
Payer: MEDICARE

## 2018-04-01 ENCOUNTER — HOSPITAL ENCOUNTER (EMERGENCY)
Age: 68
Discharge: HOME | End: 2018-04-01
Attending: EMERGENCY MEDICINE
Payer: MEDICARE

## 2018-04-01 VITALS
HEIGHT: 65 IN | TEMPERATURE: 97.3 F | RESPIRATION RATE: 16 BRPM | OXYGEN SATURATION: 89 % | HEART RATE: 79 BPM | SYSTOLIC BLOOD PRESSURE: 140 MMHG | DIASTOLIC BLOOD PRESSURE: 76 MMHG | WEIGHT: 98 LBS | BODY MASS INDEX: 16.33 KG/M2

## 2018-04-01 DIAGNOSIS — M79.89 LEG SWELLING: Primary | ICD-10-CM

## 2018-04-01 LAB
ALBUMIN SERPL-MCNC: 2.7 G/DL (ref 3.9–4.9)
ALP BLD-CCNC: 56 U/L (ref 40–130)
ALT SERPL-CCNC: 22 U/L (ref 0–33)
ANION GAP SERPL CALCULATED.3IONS-SCNC: 10 MEQ/L (ref 7–13)
AST SERPL-CCNC: 24 U/L (ref 0–35)
ATYPICAL LYMPHOCYTE RELATIVE PERCENT: 1 %
BANDED NEUTROPHILS RELATIVE PERCENT: 2 % (ref 5–11)
BASOPHILS ABSOLUTE: 0.1 K/UL (ref 0–0.2)
BASOPHILS RELATIVE PERCENT: 4 %
BILIRUB SERPL-MCNC: 0.1 MG/DL (ref 0–1.2)
BUN BLDV-MCNC: 21 MG/DL (ref 8–23)
CALCIUM SERPL-MCNC: 7.5 MG/DL (ref 8.6–10.2)
CHLORIDE BLD-SCNC: 107 MEQ/L (ref 98–107)
CO2: 27 MEQ/L (ref 22–29)
CREAT SERPL-MCNC: 1.27 MG/DL (ref 0.5–0.9)
EOSINOPHILS ABSOLUTE: 0.1 K/UL (ref 0–0.7)
EOSINOPHILS RELATIVE PERCENT: 2 %
GFR AFRICAN AMERICAN: 50.3
GFR NON-AFRICAN AMERICAN: 41.6
GLOBULIN: 2.5 G/DL (ref 2.3–3.5)
GLUCOSE BLD-MCNC: 96 MG/DL (ref 74–109)
HCT VFR BLD CALC: 38.9 % (ref 37–47)
HEMOGLOBIN: 13.2 G/DL (ref 12–16)
INR BLD: 1.2
LYMPHOCYTES ABSOLUTE: 0.9 K/UL (ref 1–4.8)
LYMPHOCYTES RELATIVE PERCENT: 29 %
MCH RBC QN AUTO: 32.1 PG (ref 27–31.3)
MCHC RBC AUTO-ENTMCNC: 33.8 % (ref 33–37)
MCV RBC AUTO: 94.7 FL (ref 82–100)
MONOCYTES ABSOLUTE: 0.3 K/UL (ref 0.2–0.8)
MONOCYTES RELATIVE PERCENT: 8.6 %
NEUTROPHILS ABSOLUTE: 1.7 K/UL (ref 1.4–6.5)
NEUTROPHILS RELATIVE PERCENT: 53 %
PDW BLD-RTO: 17.4 % (ref 11.5–14.5)
PLATELET # BLD: 138 K/UL (ref 130–400)
POTASSIUM SERPL-SCNC: 4.1 MEQ/L (ref 3.5–5.1)
PROMONOCYTES: 1 %
PROTHROMBIN TIME: 13.1 SEC (ref 8.1–13.7)
RBC # BLD: 4.11 M/UL (ref 4.2–5.4)
RBC # BLD: NORMAL 10*6/UL
SMUDGE CELLS: 1
SODIUM BLD-SCNC: 144 MEQ/L (ref 132–144)
TOTAL PROTEIN: 5.2 G/DL (ref 6.4–8.1)
WBC # BLD: 3.1 K/UL (ref 4.8–10.8)

## 2018-04-01 PROCEDURE — 93971 EXTREMITY STUDY: CPT

## 2018-04-01 PROCEDURE — 85025 COMPLETE CBC W/AUTO DIFF WBC: CPT

## 2018-04-01 PROCEDURE — 99283 EMERGENCY DEPT VISIT LOW MDM: CPT

## 2018-04-01 PROCEDURE — 36415 COLL VENOUS BLD VENIPUNCTURE: CPT

## 2018-04-01 PROCEDURE — 85610 PROTHROMBIN TIME: CPT

## 2018-04-01 PROCEDURE — 80053 COMPREHEN METABOLIC PANEL: CPT

## 2018-04-01 RX ORDER — AMOXICILLIN AND CLAVULANATE POTASSIUM 875; 125 MG/1; MG/1
1 TABLET, FILM COATED ORAL 2 TIMES DAILY
Qty: 20 TABLET | Refills: 0 | Status: SHIPPED | OUTPATIENT
Start: 2018-04-01 | End: 2018-04-11

## 2018-04-01 ASSESSMENT — ENCOUNTER SYMPTOMS
PHOTOPHOBIA: 0
CHEST TIGHTNESS: 0
ABDOMINAL PAIN: 0
SORE THROAT: 0
SHORTNESS OF BREATH: 0
COUGH: 0
ABDOMINAL DISTENTION: 0
EYE DISCHARGE: 0
WHEEZING: 0
VOMITING: 0

## 2018-04-06 ENCOUNTER — TELEPHONE (OUTPATIENT)
Dept: PHARMACY | Age: 68
End: 2018-04-06

## 2018-04-06 DIAGNOSIS — I82.91 CHRONIC EMBOLISM AND THROMBOSIS OF VEIN: ICD-10-CM

## 2018-04-13 ENCOUNTER — HOSPITAL ENCOUNTER (OUTPATIENT)
Dept: PHARMACY | Age: 68
Setting detail: THERAPIES SERIES
Discharge: HOME | End: 2018-04-13
Payer: MEDICARE

## 2018-04-13 DIAGNOSIS — I82.91 CHRONIC EMBOLISM AND THROMBOSIS OF VEIN: ICD-10-CM

## 2018-04-13 LAB
INR BLD: 2.3
PROTIME: 27.6 SECONDS

## 2018-04-13 PROCEDURE — 85610 PROTHROMBIN TIME: CPT

## 2018-04-13 PROCEDURE — 99211 OFF/OP EST MAY X REQ PHY/QHP: CPT

## 2018-05-11 ENCOUNTER — HOSPITAL ENCOUNTER (OUTPATIENT)
Dept: PHARMACY | Age: 68
Setting detail: THERAPIES SERIES
Discharge: HOME | End: 2018-05-11
Payer: MEDICARE

## 2018-05-11 DIAGNOSIS — I82.91 CHRONIC EMBOLISM AND THROMBOSIS OF VEIN: ICD-10-CM

## 2018-05-11 LAB
INR BLD: 1.5
PROTIME: 17.9 SECONDS

## 2018-05-11 PROCEDURE — 99211 OFF/OP EST MAY X REQ PHY/QHP: CPT | Performed by: PHARMACIST

## 2018-05-11 PROCEDURE — 85610 PROTHROMBIN TIME: CPT | Performed by: PHARMACIST

## 2018-05-25 ENCOUNTER — HOSPITAL ENCOUNTER (OUTPATIENT)
Dept: PHARMACY | Age: 68
Setting detail: THERAPIES SERIES
Discharge: HOME | End: 2018-05-25
Payer: MEDICARE

## 2018-05-25 DIAGNOSIS — I82.91 CHRONIC EMBOLISM AND THROMBOSIS OF VEIN: ICD-10-CM

## 2018-05-25 LAB
INR BLD: 1.7
PROTIME: 20.7 SECONDS

## 2018-05-25 PROCEDURE — 85610 PROTHROMBIN TIME: CPT

## 2018-05-25 PROCEDURE — 99211 OFF/OP EST MAY X REQ PHY/QHP: CPT

## 2018-06-22 ENCOUNTER — HOSPITAL ENCOUNTER (OUTPATIENT)
Dept: PHARMACY | Age: 68
Setting detail: THERAPIES SERIES
Discharge: HOME | End: 2018-06-22
Payer: MEDICARE

## 2018-06-22 DIAGNOSIS — I82.91 CHRONIC EMBOLISM AND THROMBOSIS OF VEIN: ICD-10-CM

## 2018-06-22 LAB
INR BLD: 2.3
PROTIME: 28 SECONDS

## 2018-06-22 PROCEDURE — 99211 OFF/OP EST MAY X REQ PHY/QHP: CPT | Performed by: PHARMACIST

## 2018-06-22 PROCEDURE — 85610 PROTHROMBIN TIME: CPT | Performed by: PHARMACIST

## 2018-07-20 ENCOUNTER — HOSPITAL ENCOUNTER (OUTPATIENT)
Dept: PHARMACY | Age: 68
Setting detail: THERAPIES SERIES
Discharge: HOME | End: 2018-07-20
Payer: MEDICARE

## 2018-07-20 DIAGNOSIS — I82.91 CHRONIC EMBOLISM AND THROMBOSIS OF VEIN: ICD-10-CM

## 2018-07-20 LAB
INR BLD: 2.9
PROTIME: 35.3 SECONDS

## 2018-07-20 PROCEDURE — 85610 PROTHROMBIN TIME: CPT

## 2018-07-20 PROCEDURE — 99211 OFF/OP EST MAY X REQ PHY/QHP: CPT

## 2018-07-20 NOTE — PROGRESS NOTES
Ms. Bishop Thakur is a 77 y.o. y/o female with history of Chronic embolism who presents today for anticoagulation monitoring and adjustment.   INR 2.9 is therapeutic for this patient (goal range 2-3) and is reflective of 32.5 mg TWD  Patient verifies current dosing regimen, patient able to verbally recall dose  Patient reports 0  missed doses since last INR   Patient denies s/sx clotting and/or stroke  Patient denies hematuria, epistaxis, rectal bleeding  Patient denies changes in diet, alcohol, or tobacco use  Reviewed medication list and drug allergies with patient, updated any medication additions or modifications accordingly  Patient also denies any pending medical or dental procedures scheduled at this time  Patient was instructed to continue 32.5 mg TWD and RTC 4 weeks

## 2018-08-17 ENCOUNTER — HOSPITAL ENCOUNTER (OUTPATIENT)
Dept: PHARMACY | Age: 68
Setting detail: THERAPIES SERIES
Discharge: HOME | End: 2018-08-17
Payer: MEDICARE

## 2018-08-17 DIAGNOSIS — I82.91 CHRONIC EMBOLISM AND THROMBOSIS OF VEIN: ICD-10-CM

## 2018-08-17 LAB
INR BLD: 1.3
PROTIME: 15.6 SECONDS

## 2018-08-17 PROCEDURE — 85610 PROTHROMBIN TIME: CPT

## 2018-08-17 PROCEDURE — 99211 OFF/OP EST MAY X REQ PHY/QHP: CPT

## 2018-08-30 ENCOUNTER — HOSPITAL ENCOUNTER (OUTPATIENT)
Dept: PHARMACY | Age: 68
Setting detail: THERAPIES SERIES
Discharge: HOME | End: 2018-08-30
Payer: MEDICARE

## 2018-08-30 DIAGNOSIS — I82.91 CHRONIC EMBOLISM AND THROMBOSIS OF VEIN: ICD-10-CM

## 2018-08-30 LAB
INR BLD: 2
PROTIME: 23.5 SECONDS

## 2018-08-30 PROCEDURE — 99211 OFF/OP EST MAY X REQ PHY/QHP: CPT

## 2018-08-30 PROCEDURE — 85610 PROTHROMBIN TIME: CPT

## 2018-08-30 NOTE — PROGRESS NOTES
Ms. Demetria Arias is a 77 y.o. y/o female with history of Chronic Embolism who presents today for anticoagulation monitoring and adjustment.   INR 2.0 is therapeutic for this patient (goal range 2-3) and is reflective of 35 mg TWD  Patient verifies current dosing regimen, patient able to verbally recall dose  Patient reports 0  missed doses since last INR   Patient denies s/sx clotting and/or stroke  Patient denies hematuria, epistaxis, rectal bleeding  Patient denies changes in diet, alcohol, or tobacco use  Reviewed medication list and drug allergies with patient, updated any medication additions or modifications accordingly  Patient also denies any pending medical or dental procedures scheduled at this time  Patient was instructed to continue 35 mg TWD and RTC 3-4 weeks

## 2018-09-27 ENCOUNTER — HOSPITAL ENCOUNTER (OUTPATIENT)
Dept: PHARMACY | Age: 68
Setting detail: THERAPIES SERIES
Discharge: HOME | End: 2018-09-27
Payer: MEDICARE

## 2018-09-27 DIAGNOSIS — I82.91 CHRONIC EMBOLISM AND THROMBOSIS OF VEIN: ICD-10-CM

## 2018-09-27 LAB
INR BLD: 1.9
PROTIME: 23.1 SECONDS

## 2018-09-27 PROCEDURE — 85610 PROTHROMBIN TIME: CPT

## 2018-09-27 PROCEDURE — 99211 OFF/OP EST MAY X REQ PHY/QHP: CPT

## 2018-10-25 ENCOUNTER — HOSPITAL ENCOUNTER (OUTPATIENT)
Dept: PHARMACY | Age: 68
Setting detail: THERAPIES SERIES
Discharge: HOME | End: 2018-10-25
Payer: MEDICARE

## 2018-10-25 DIAGNOSIS — I82.91 CHRONIC EMBOLISM AND THROMBOSIS OF VEIN: ICD-10-CM

## 2018-10-25 PROCEDURE — 85610 PROTHROMBIN TIME: CPT

## 2018-10-25 PROCEDURE — 99211 OFF/OP EST MAY X REQ PHY/QHP: CPT

## 2018-10-25 RX ORDER — WARFARIN SODIUM 5 MG/1
TABLET ORAL
Qty: 100 TABLET | Refills: 1 | OUTPATIENT
Start: 2018-10-25 | End: 2018-12-27 | Stop reason: SDUPTHER

## 2018-11-29 ENCOUNTER — HOSPITAL ENCOUNTER (OUTPATIENT)
Dept: PHARMACY | Age: 68
Setting detail: THERAPIES SERIES
Discharge: HOME | End: 2018-11-29
Payer: MEDICARE

## 2018-11-29 DIAGNOSIS — I82.91 CHRONIC EMBOLISM AND THROMBOSIS OF VEIN: ICD-10-CM

## 2018-11-29 LAB — INTERNATIONAL NORMALIZATION RATIO, POC: 1.9

## 2018-11-29 PROCEDURE — 85610 PROTHROMBIN TIME: CPT | Performed by: PHARMACIST

## 2018-11-29 PROCEDURE — 99211 OFF/OP EST MAY X REQ PHY/QHP: CPT | Performed by: PHARMACIST

## 2018-12-27 ENCOUNTER — HOSPITAL ENCOUNTER (OUTPATIENT)
Dept: PHARMACY | Age: 68
Setting detail: THERAPIES SERIES
Discharge: HOME | End: 2018-12-27
Payer: MEDICARE

## 2018-12-27 DIAGNOSIS — I82.91 CHRONIC EMBOLISM AND THROMBOSIS OF VEIN: ICD-10-CM

## 2018-12-27 LAB — INTERNATIONAL NORMALIZATION RATIO, POC: 2.5

## 2018-12-27 PROCEDURE — 85610 PROTHROMBIN TIME: CPT

## 2018-12-27 PROCEDURE — 99211 OFF/OP EST MAY X REQ PHY/QHP: CPT

## 2018-12-27 RX ORDER — WARFARIN SODIUM 5 MG/1
TABLET ORAL
Qty: 120 TABLET | Refills: 1 | OUTPATIENT
Start: 2018-12-27 | End: 2019-01-01 | Stop reason: SDUPTHER

## 2018-12-27 NOTE — PROGRESS NOTES
Ms. Nasreen Angeles is a 77 y.o. y/o female with history of chronic embolism who presents today for anticoagulation monitoring and adjustment.   INR 2.5 is therapeutic for this patient (goal range 2-3) and is reflective of 37.5 mg TWD  Patient verifies current dosing regimen, patient able to verbally recall dose  Patient reports 0  missed doses since last INR   Patient denies s/sx clotting and/or stroke  Patient denies hematuria, epistaxis, rectal bleeding  Patient denies changes in diet, alcohol, or tobacco use  Reviewed medication list and drug allergies with patient, updated any medication additions or modifications accordingly  Patient also denies any pending medical or dental procedures scheduled at this time  Patient was instructed to continue 37.5 mg TWD and RTC 4 weeks

## 2019-01-01 ENCOUNTER — HOSPITAL ENCOUNTER (INPATIENT)
Age: 69
LOS: 14 days | Discharge: HOSPICE | DRG: 813 | End: 2019-12-27
Attending: EMERGENCY MEDICINE
Payer: MEDICARE

## 2019-01-01 ENCOUNTER — TELEPHONE (OUTPATIENT)
Dept: PHARMACY | Age: 69
End: 2019-01-01

## 2019-01-01 ENCOUNTER — APPOINTMENT (OUTPATIENT)
Dept: GENERAL RADIOLOGY | Age: 69
DRG: 870 | End: 2019-01-01
Payer: MEDICARE

## 2019-01-01 ENCOUNTER — APPOINTMENT (OUTPATIENT)
Dept: CT IMAGING | Age: 69
DRG: 870 | End: 2019-01-01
Payer: MEDICARE

## 2019-01-01 ENCOUNTER — APPOINTMENT (OUTPATIENT)
Dept: GENERAL RADIOLOGY | Age: 69
DRG: 813 | End: 2019-01-01
Payer: MEDICARE

## 2019-01-01 ENCOUNTER — HOSPITAL ENCOUNTER (OUTPATIENT)
Dept: PHARMACY | Age: 69
Setting detail: THERAPIES SERIES
Discharge: HOME | End: 2019-04-04
Payer: MEDICARE

## 2019-01-01 ENCOUNTER — HOSPITAL ENCOUNTER (OUTPATIENT)
Dept: PHARMACY | Age: 69
Setting detail: THERAPIES SERIES
Discharge: HOME | End: 2019-11-26
Payer: MEDICARE

## 2019-01-01 ENCOUNTER — APPOINTMENT (OUTPATIENT)
Dept: ULTRASOUND IMAGING | Age: 69
DRG: 870 | End: 2019-01-01
Payer: MEDICARE

## 2019-01-01 ENCOUNTER — HOSPITAL ENCOUNTER (OUTPATIENT)
Dept: PHARMACY | Age: 69
Setting detail: THERAPIES SERIES
Discharge: HOME | End: 2019-07-23
Payer: MEDICARE

## 2019-01-01 ENCOUNTER — HOSPITAL ENCOUNTER (OUTPATIENT)
Dept: PHARMACY | Age: 69
Setting detail: THERAPIES SERIES
Discharge: HOME | End: 2019-03-07
Payer: MEDICARE

## 2019-01-01 ENCOUNTER — HOSPITAL ENCOUNTER (OUTPATIENT)
Dept: PHARMACY | Age: 69
Setting detail: THERAPIES SERIES
Discharge: HOME | End: 2019-11-19
Payer: MEDICARE

## 2019-01-01 ENCOUNTER — HOSPITAL ENCOUNTER (OUTPATIENT)
Dept: PHARMACY | Age: 69
Setting detail: THERAPIES SERIES
Discharge: HOME | End: 2019-08-21
Payer: MEDICARE

## 2019-01-01 ENCOUNTER — HOSPITAL ENCOUNTER (OUTPATIENT)
Dept: PHARMACY | Age: 69
Setting detail: THERAPIES SERIES
Discharge: HOME | End: 2019-04-23
Payer: MEDICARE

## 2019-01-01 ENCOUNTER — HOSPITAL ENCOUNTER (OUTPATIENT)
Dept: PHARMACY | Age: 69
Setting detail: THERAPIES SERIES
Discharge: HOME | End: 2019-05-29
Payer: MEDICARE

## 2019-01-01 ENCOUNTER — HOSPITAL ENCOUNTER (INPATIENT)
Age: 69
LOS: 13 days | Discharge: NURSING, REHAB OR LONG-TERM CARE | DRG: 870 | End: 2019-12-11
Attending: INTERNAL MEDICINE | Admitting: INTERNAL MEDICINE
Payer: MEDICARE

## 2019-01-01 ENCOUNTER — HOSPITAL ENCOUNTER (OUTPATIENT)
Dept: PHARMACY | Age: 69
Setting detail: THERAPIES SERIES
Discharge: HOME | End: 2019-05-08
Payer: MEDICARE

## 2019-01-01 ENCOUNTER — APPOINTMENT (OUTPATIENT)
Dept: MRI IMAGING | Age: 69
DRG: 870 | End: 2019-01-01
Payer: MEDICARE

## 2019-01-01 ENCOUNTER — ANESTHESIA (OUTPATIENT)
Dept: OPERATING ROOM | Age: 69
DRG: 813 | End: 2019-01-01
Payer: MEDICARE

## 2019-01-01 ENCOUNTER — HOSPITAL ENCOUNTER (OUTPATIENT)
Dept: PHARMACY | Age: 69
Setting detail: THERAPIES SERIES
Discharge: HOME | End: 2019-02-04
Payer: MEDICARE

## 2019-01-01 ENCOUNTER — ANESTHESIA EVENT (OUTPATIENT)
Dept: OPERATING ROOM | Age: 69
DRG: 813 | End: 2019-01-01
Payer: MEDICARE

## 2019-01-01 VITALS
TEMPERATURE: 97.6 F | HEIGHT: 65 IN | DIASTOLIC BLOOD PRESSURE: 65 MMHG | WEIGHT: 114.64 LBS | RESPIRATION RATE: 22 BRPM | HEART RATE: 64 BPM | SYSTOLIC BLOOD PRESSURE: 154 MMHG | BODY MASS INDEX: 19.1 KG/M2 | OXYGEN SATURATION: 100 %

## 2019-01-01 VITALS
TEMPERATURE: 97 F | HEART RATE: 59 BPM | SYSTOLIC BLOOD PRESSURE: 149 MMHG | OXYGEN SATURATION: 96 % | BODY MASS INDEX: 15.32 KG/M2 | RESPIRATION RATE: 28 BRPM | DIASTOLIC BLOOD PRESSURE: 68 MMHG | HEIGHT: 65 IN | WEIGHT: 91.93 LBS

## 2019-01-01 VITALS — SYSTOLIC BLOOD PRESSURE: 167 MMHG | DIASTOLIC BLOOD PRESSURE: 79 MMHG | OXYGEN SATURATION: 90 %

## 2019-01-01 DIAGNOSIS — I82.91 CHRONIC EMBOLISM AND THROMBOSIS OF VEIN: ICD-10-CM

## 2019-01-01 DIAGNOSIS — J96.00 ACUTE RESPIRATORY FAILURE, UNSPECIFIED WHETHER WITH HYPOXIA OR HYPERCAPNIA (HCC): Primary | ICD-10-CM

## 2019-01-01 LAB
ABO/RH: NORMAL
ALBUMIN SERPL-MCNC: 1.9 G/DL (ref 3.5–4.6)
ALBUMIN SERPL-MCNC: 2 G/DL (ref 3.5–4.6)
ALBUMIN SERPL-MCNC: 2.1 G/DL (ref 3.5–4.6)
ALBUMIN SERPL-MCNC: 2.2 G/DL (ref 3.5–4.6)
ALBUMIN SERPL-MCNC: 2.3 G/DL (ref 3.5–4.6)
ALBUMIN SERPL-MCNC: 2.3 G/DL (ref 3.5–4.6)
ALBUMIN SERPL-MCNC: 2.4 G/DL (ref 3.5–4.6)
ALBUMIN SERPL-MCNC: 2.5 G/DL (ref 3.5–4.6)
ALBUMIN SERPL-MCNC: 2.6 G/DL (ref 3.5–4.6)
ALBUMIN SERPL-MCNC: 2.7 G/DL (ref 3.5–4.6)
ALBUMIN SERPL-MCNC: 2.8 G/DL (ref 3.5–4.6)
ALBUMIN SERPL-MCNC: 2.9 G/DL (ref 3.5–4.6)
ALP BLD-CCNC: 54 U/L (ref 40–130)
ALP BLD-CCNC: 55 U/L (ref 40–130)
ALP BLD-CCNC: 56 U/L (ref 40–130)
ALP BLD-CCNC: 56 U/L (ref 40–130)
ALP BLD-CCNC: 58 U/L (ref 40–130)
ALP BLD-CCNC: 58 U/L (ref 40–130)
ALP BLD-CCNC: 59 U/L (ref 40–130)
ALP BLD-CCNC: 62 U/L (ref 40–130)
ALP BLD-CCNC: 65 U/L (ref 40–130)
ALP BLD-CCNC: 67 U/L (ref 40–130)
ALP BLD-CCNC: 68 U/L (ref 40–130)
ALP BLD-CCNC: 72 U/L (ref 40–130)
ALP BLD-CCNC: 75 U/L (ref 40–130)
ALP BLD-CCNC: 81 U/L (ref 40–130)
ALP BLD-CCNC: 84 U/L (ref 40–130)
ALP BLD-CCNC: 84 U/L (ref 40–130)
ALT SERPL-CCNC: 128 U/L (ref 0–33)
ALT SERPL-CCNC: 13 U/L (ref 0–33)
ALT SERPL-CCNC: 15 U/L (ref 0–33)
ALT SERPL-CCNC: 15 U/L (ref 0–33)
ALT SERPL-CCNC: 16 U/L (ref 0–33)
ALT SERPL-CCNC: 17 U/L (ref 0–33)
ALT SERPL-CCNC: 19 U/L (ref 0–33)
ALT SERPL-CCNC: 20 U/L (ref 0–33)
ALT SERPL-CCNC: 21 U/L (ref 0–33)
ALT SERPL-CCNC: 23 U/L (ref 0–33)
ALT SERPL-CCNC: 30 U/L (ref 0–33)
ALT SERPL-CCNC: 33 U/L (ref 0–33)
ALT SERPL-CCNC: 40 U/L (ref 0–33)
ALT SERPL-CCNC: 60 U/L (ref 0–33)
ALT SERPL-CCNC: 74 U/L (ref 0–33)
ALT SERPL-CCNC: 76 U/L (ref 0–33)
ALT SERPL-CCNC: 8 U/L (ref 0–33)
ALT SERPL-CCNC: 92 U/L (ref 0–33)
AMPHETAMINE SCREEN, URINE: NORMAL
ANION GAP SERPL CALCULATED.3IONS-SCNC: 10 MEQ/L (ref 9–15)
ANION GAP SERPL CALCULATED.3IONS-SCNC: 10 MEQ/L (ref 9–15)
ANION GAP SERPL CALCULATED.3IONS-SCNC: 11 MEQ/L (ref 9–15)
ANION GAP SERPL CALCULATED.3IONS-SCNC: 12 MEQ/L (ref 9–15)
ANION GAP SERPL CALCULATED.3IONS-SCNC: 13 MEQ/L (ref 9–15)
ANION GAP SERPL CALCULATED.3IONS-SCNC: 14 MEQ/L (ref 9–15)
ANION GAP SERPL CALCULATED.3IONS-SCNC: 15 MEQ/L (ref 9–15)
ANION GAP SERPL CALCULATED.3IONS-SCNC: 16 MEQ/L (ref 9–15)
ANION GAP SERPL CALCULATED.3IONS-SCNC: 16 MEQ/L (ref 9–15)
ANION GAP SERPL CALCULATED.3IONS-SCNC: 17 MEQ/L (ref 9–15)
ANION GAP SERPL CALCULATED.3IONS-SCNC: 8 MEQ/L (ref 9–15)
ANION GAP SERPL CALCULATED.3IONS-SCNC: 9 MEQ/L (ref 9–15)
ANTIBODY SCREEN: NORMAL
APPEARANCE CSF: CLEAR
APTT: 28.9 SEC (ref 24.4–36.8)
APTT: 30.3 SEC (ref 24.4–36.8)
APTT: 30.7 SEC (ref 24.4–36.8)
APTT: 31.5 SEC (ref 24.4–36.8)
APTT: 31.5 SEC (ref 24.4–36.8)
APTT: 33.2 SEC (ref 24.4–36.8)
APTT: 33.5 SEC (ref 24.4–36.8)
APTT: 34.2 SEC (ref 24.4–36.8)
APTT: 34.7 SEC (ref 24.4–36.8)
APTT: 34.8 SEC (ref 24.4–36.8)
APTT: 37.1 SEC (ref 24.4–36.8)
APTT: 37.5 SEC (ref 24.4–36.8)
APTT: 40.8 SEC (ref 24.4–36.8)
APTT: 44.9 SEC (ref 24.4–36.8)
APTT: 57.2 SEC (ref 24.4–36.8)
AST SERPL-CCNC: 10 U/L (ref 0–35)
AST SERPL-CCNC: 11 U/L (ref 0–35)
AST SERPL-CCNC: 12 U/L (ref 0–35)
AST SERPL-CCNC: 14 U/L (ref 0–35)
AST SERPL-CCNC: 15 U/L (ref 0–35)
AST SERPL-CCNC: 15 U/L (ref 0–35)
AST SERPL-CCNC: 16 U/L (ref 0–35)
AST SERPL-CCNC: 19 U/L (ref 0–35)
AST SERPL-CCNC: 30 U/L (ref 0–35)
AST SERPL-CCNC: 35 U/L (ref 0–35)
AST SERPL-CCNC: 45 U/L (ref 0–35)
AST SERPL-CCNC: 51 U/L (ref 0–35)
AST SERPL-CCNC: 84 U/L (ref 0–35)
BACTERIA: NEGATIVE /HPF
BACTERIA: NEGATIVE /HPF
BARBITURATE SCREEN URINE: NORMAL
BASE EXCESS ARTERIAL: -1 (ref -3–3)
BASE EXCESS ARTERIAL: -3 (ref -3–3)
BASE EXCESS ARTERIAL: -4 (ref -3–3)
BASE EXCESS ARTERIAL: -5 (ref -3–3)
BASE EXCESS ARTERIAL: -7 (ref -3–3)
BASE EXCESS ARTERIAL: -7 (ref -3–3)
BASE EXCESS ARTERIAL: -9 (ref -3–3)
BASE EXCESS VENOUS: -5 (ref -3–3)
BASOPHILS ABSOLUTE: 0 K/UL (ref 0–0.2)
BASOPHILS ABSOLUTE: 0.1 K/UL (ref 0–0.2)
BASOPHILS RELATIVE PERCENT: 0.2 %
BASOPHILS RELATIVE PERCENT: 0.2 %
BASOPHILS RELATIVE PERCENT: 0.3 %
BASOPHILS RELATIVE PERCENT: 0.3 %
BASOPHILS RELATIVE PERCENT: 0.4 %
BASOPHILS RELATIVE PERCENT: 0.8 %
BASOPHILS RELATIVE PERCENT: 0.9 %
BASOPHILS RELATIVE PERCENT: 0.9 %
BASOPHILS RELATIVE PERCENT: 1 %
BASOPHILS RELATIVE PERCENT: 1.1 %
BASOPHILS RELATIVE PERCENT: 1.2 %
BASOPHILS RELATIVE PERCENT: 1.3 %
BENZODIAZEPINE SCREEN, URINE: NORMAL
BILIRUB SERPL-MCNC: 0.2 MG/DL (ref 0.2–0.7)
BILIRUB SERPL-MCNC: <0.2 MG/DL (ref 0.2–0.7)
BILIRUBIN DIRECT: <0.2 MG/DL (ref 0–0.4)
BILIRUBIN URINE: NEGATIVE
BILIRUBIN URINE: NEGATIVE
BILIRUBIN, INDIRECT: ABNORMAL MG/DL (ref 0–0.6)
BLOOD BANK DISPENSE STATUS: NORMAL
BLOOD BANK PRODUCT CODE: NORMAL
BLOOD CULTURE, ROUTINE: NORMAL
BLOOD CULTURE, ROUTINE: NORMAL
BLOOD, URINE: ABNORMAL
BLOOD, URINE: NEGATIVE
BPU ID: NORMAL
BUN BLDV-MCNC: 26 MG/DL (ref 8–23)
BUN BLDV-MCNC: 27 MG/DL (ref 8–23)
BUN BLDV-MCNC: 30 MG/DL (ref 8–23)
BUN BLDV-MCNC: 31 MG/DL (ref 8–23)
BUN BLDV-MCNC: 31 MG/DL (ref 8–23)
BUN BLDV-MCNC: 33 MG/DL (ref 8–23)
BUN BLDV-MCNC: 35 MG/DL (ref 8–23)
BUN BLDV-MCNC: 35 MG/DL (ref 8–23)
BUN BLDV-MCNC: 37 MG/DL (ref 8–23)
BUN BLDV-MCNC: 40 MG/DL (ref 8–23)
BUN BLDV-MCNC: 40 MG/DL (ref 8–23)
BUN BLDV-MCNC: 42 MG/DL (ref 8–23)
BUN BLDV-MCNC: 43 MG/DL (ref 8–23)
BUN BLDV-MCNC: 43 MG/DL (ref 8–23)
BUN BLDV-MCNC: 44 MG/DL (ref 8–23)
BUN BLDV-MCNC: 44 MG/DL (ref 8–23)
BUN BLDV-MCNC: 46 MG/DL (ref 8–23)
BUN BLDV-MCNC: 47 MG/DL (ref 8–23)
BUN BLDV-MCNC: 48 MG/DL (ref 8–23)
BUN BLDV-MCNC: 49 MG/DL (ref 8–23)
BUN BLDV-MCNC: 52 MG/DL (ref 8–23)
BUN BLDV-MCNC: 57 MG/DL (ref 8–23)
BUN BLDV-MCNC: 59 MG/DL (ref 8–23)
BUN BLDV-MCNC: 60 MG/DL (ref 8–23)
BUN BLDV-MCNC: 62 MG/DL (ref 8–23)
BUN BLDV-MCNC: 62 MG/DL (ref 8–23)
C-REACTIVE PROTEIN, HIGH SENSITIVITY: 9.7 MG/L (ref 0–5)
CALCIUM IONIZED: 1.07 MMOL/L (ref 1.12–1.32)
CALCIUM IONIZED: 1.1 MMOL/L (ref 1.12–1.32)
CALCIUM IONIZED: 1.13 MMOL/L (ref 1.12–1.32)
CALCIUM IONIZED: 1.23 MMOL/L (ref 1.12–1.32)
CALCIUM IONIZED: 1.28 MMOL/L (ref 1.12–1.32)
CALCIUM IONIZED: 1.31 MMOL/L (ref 1.12–1.32)
CALCIUM IONIZED: 1.31 MMOL/L (ref 1.12–1.32)
CALCIUM SERPL-MCNC: 7.3 MG/DL (ref 8.5–9.9)
CALCIUM SERPL-MCNC: 7.4 MG/DL (ref 8.5–9.9)
CALCIUM SERPL-MCNC: 7.5 MG/DL (ref 8.5–9.9)
CALCIUM SERPL-MCNC: 7.6 MG/DL (ref 8.5–9.9)
CALCIUM SERPL-MCNC: 7.7 MG/DL (ref 8.5–9.9)
CALCIUM SERPL-MCNC: 7.8 MG/DL (ref 8.5–9.9)
CALCIUM SERPL-MCNC: 7.9 MG/DL (ref 8.5–9.9)
CALCIUM SERPL-MCNC: 8 MG/DL (ref 8.5–9.9)
CALCIUM SERPL-MCNC: 8.1 MG/DL (ref 8.5–9.9)
CALCIUM SERPL-MCNC: 8.2 MG/DL (ref 8.5–9.9)
CALCIUM SERPL-MCNC: 8.2 MG/DL (ref 8.5–9.9)
CALCIUM SERPL-MCNC: 8.3 MG/DL (ref 8.5–9.9)
CALCIUM SERPL-MCNC: 8.3 MG/DL (ref 8.5–9.9)
CANNABINOID SCREEN URINE: NORMAL
CHLORIDE BLD-SCNC: 105 MEQ/L (ref 95–107)
CHLORIDE BLD-SCNC: 105 MEQ/L (ref 95–107)
CHLORIDE BLD-SCNC: 106 MEQ/L (ref 95–107)
CHLORIDE BLD-SCNC: 107 MEQ/L (ref 95–107)
CHLORIDE BLD-SCNC: 108 MEQ/L (ref 95–107)
CHLORIDE BLD-SCNC: 109 MEQ/L (ref 95–107)
CHLORIDE BLD-SCNC: 109 MEQ/L (ref 95–107)
CHLORIDE BLD-SCNC: 110 MEQ/L (ref 95–107)
CHLORIDE BLD-SCNC: 110 MEQ/L (ref 95–107)
CHLORIDE BLD-SCNC: 111 MEQ/L (ref 95–107)
CHLORIDE BLD-SCNC: 111 MEQ/L (ref 95–107)
CHLORIDE BLD-SCNC: 112 MEQ/L (ref 95–107)
CHLORIDE BLD-SCNC: 112 MEQ/L (ref 95–107)
CHLORIDE BLD-SCNC: 113 MEQ/L (ref 95–107)
CHLORIDE BLD-SCNC: 114 MEQ/L (ref 95–107)
CHLORIDE BLD-SCNC: 114 MEQ/L (ref 95–107)
CHLORIDE BLD-SCNC: 115 MEQ/L (ref 95–107)
CHOLESTEROL, TOTAL: 158 MG/DL (ref 0–199)
CHP ED QC CHECK: YES
CK MB: 4.1 NG/ML (ref 0–3.8)
CK MB: 7.9 NG/ML (ref 0–3.8)
CLARITY: ABNORMAL
CLARITY: CLEAR
CLOT EVALUATION CSF: NORMAL
CO2: 15 MEQ/L (ref 20–31)
CO2: 16 MEQ/L (ref 20–31)
CO2: 17 MEQ/L (ref 20–31)
CO2: 18 MEQ/L (ref 20–31)
CO2: 18 MEQ/L (ref 20–31)
CO2: 19 MEQ/L (ref 20–31)
CO2: 20 MEQ/L (ref 20–31)
CO2: 21 MEQ/L (ref 20–31)
CO2: 22 MEQ/L (ref 20–31)
CO2: 23 MEQ/L (ref 20–31)
COCAINE METABOLITE SCREEN URINE: NORMAL
COLOR CSF: COLORLESS
COLOR: YELLOW
COLOR: YELLOW
CREAT SERPL-MCNC: 2.06 MG/DL (ref 0.5–0.9)
CREAT SERPL-MCNC: 2.1 MG/DL (ref 0.5–0.9)
CREAT SERPL-MCNC: 2.2 MG/DL (ref 0.5–0.9)
CREAT SERPL-MCNC: 2.25 MG/DL (ref 0.5–0.9)
CREAT SERPL-MCNC: 2.26 MG/DL (ref 0.5–0.9)
CREAT SERPL-MCNC: 2.33 MG/DL (ref 0.5–0.9)
CREAT SERPL-MCNC: 2.38 MG/DL (ref 0.5–0.9)
CREAT SERPL-MCNC: 2.38 MG/DL (ref 0.5–0.9)
CREAT SERPL-MCNC: 2.41 MG/DL (ref 0.5–0.9)
CREAT SERPL-MCNC: 2.46 MG/DL (ref 0.5–0.9)
CREAT SERPL-MCNC: 2.49 MG/DL (ref 0.5–0.9)
CREAT SERPL-MCNC: 2.54 MG/DL (ref 0.5–0.9)
CREAT SERPL-MCNC: 2.58 MG/DL (ref 0.5–0.9)
CREAT SERPL-MCNC: 2.62 MG/DL (ref 0.5–0.9)
CREAT SERPL-MCNC: 2.63 MG/DL (ref 0.5–0.9)
CREAT SERPL-MCNC: 2.63 MG/DL (ref 0.5–0.9)
CREAT SERPL-MCNC: 2.64 MG/DL (ref 0.5–0.9)
CREAT SERPL-MCNC: 2.64 MG/DL (ref 0.5–0.9)
CREAT SERPL-MCNC: 2.69 MG/DL (ref 0.5–0.9)
CREAT SERPL-MCNC: 2.75 MG/DL (ref 0.5–0.9)
CREAT SERPL-MCNC: 2.76 MG/DL (ref 0.5–0.9)
CREAT SERPL-MCNC: 2.8 MG/DL (ref 0.5–0.9)
CREAT SERPL-MCNC: 2.81 MG/DL (ref 0.5–0.9)
CREAT SERPL-MCNC: 2.82 MG/DL (ref 0.5–0.9)
CREAT SERPL-MCNC: 2.84 MG/DL (ref 0.5–0.9)
CREAT SERPL-MCNC: 2.84 MG/DL (ref 0.5–0.9)
CREAT SERPL-MCNC: 2.86 MG/DL (ref 0.5–0.9)
CREAT SERPL-MCNC: 2.93 MG/DL (ref 0.5–0.9)
CREAT SERPL-MCNC: 2.93 MG/DL (ref 0.5–0.9)
CREAT SERPL-MCNC: 3.17 MG/DL (ref 0.5–0.9)
CREATINE KINASE-MB INDEX: 12.4 % (ref 0–3.5)
CREATINE KINASE-MB INDEX: 16.1 % (ref 0–3.5)
CREATININE URINE: 39.9 MG/DL
CRYPTOCOCCAL ANTIGEN CSF: NEGATIVE
CSF CULTURE: NORMAL
CULTURE, BLOOD 2: NORMAL
CULTURE, BLOOD 2: NORMAL
CULTURE, RESPIRATORY: ABNORMAL
CULTURE, RESPIRATORY: ABNORMAL
D DIMER: 3.15 MG/L FEU (ref 0–0.5)
DESCRIPTION BLOOD BANK: NORMAL
EKG ATRIAL RATE: 121 BPM
EKG ATRIAL RATE: 60 BPM
EKG ATRIAL RATE: 62 BPM
EKG ATRIAL RATE: 78 BPM
EKG P AXIS: 73 DEGREES
EKG P AXIS: 80 DEGREES
EKG P AXIS: 84 DEGREES
EKG P AXIS: 87 DEGREES
EKG P-R INTERVAL: 132 MS
EKG P-R INTERVAL: 148 MS
EKG P-R INTERVAL: 148 MS
EKG P-R INTERVAL: 154 MS
EKG Q-T INTERVAL: 304 MS
EKG Q-T INTERVAL: 424 MS
EKG Q-T INTERVAL: 430 MS
EKG Q-T INTERVAL: 516 MS
EKG QRS DURATION: 72 MS
EKG QRS DURATION: 80 MS
EKG QRS DURATION: 86 MS
EKG QRS DURATION: 90 MS
EKG QTC CALCULATION (BAZETT): 430 MS
EKG QTC CALCULATION (BAZETT): 431 MS
EKG QTC CALCULATION (BAZETT): 483 MS
EKG QTC CALCULATION (BAZETT): 523 MS
EKG R AXIS: 26 DEGREES
EKG R AXIS: 43 DEGREES
EKG R AXIS: 68 DEGREES
EKG R AXIS: 73 DEGREES
EKG T AXIS: 106 DEGREES
EKG T AXIS: 63 DEGREES
EKG T AXIS: 63 DEGREES
EKG T AXIS: 90 DEGREES
EKG VENTRICULAR RATE: 121 BPM
EKG VENTRICULAR RATE: 60 BPM
EKG VENTRICULAR RATE: 62 BPM
EKG VENTRICULAR RATE: 78 BPM
EOSINOPHILS ABSOLUTE: 0 K/UL (ref 0–0.7)
EOSINOPHILS ABSOLUTE: 0.1 K/UL (ref 0–0.7)
EOSINOPHILS ABSOLUTE: 0.2 K/UL (ref 0–0.7)
EOSINOPHILS RELATIVE PERCENT: 0 %
EOSINOPHILS RELATIVE PERCENT: 0.2 %
EOSINOPHILS RELATIVE PERCENT: 0.4 %
EOSINOPHILS RELATIVE PERCENT: 1.2 %
EOSINOPHILS RELATIVE PERCENT: 1.2 %
EOSINOPHILS RELATIVE PERCENT: 1.6 %
EOSINOPHILS RELATIVE PERCENT: 1.6 %
EOSINOPHILS RELATIVE PERCENT: 1.7 %
EOSINOPHILS RELATIVE PERCENT: 1.9 %
EOSINOPHILS RELATIVE PERCENT: 2 %
EOSINOPHILS RELATIVE PERCENT: 2 %
EPITHELIAL CELLS, UA: ABNORMAL /HPF (ref 0–5)
EPITHELIAL CELLS, UA: NORMAL /HPF (ref 0–5)
GFR AFRICAN AMERICAN: 17.4
GFR AFRICAN AMERICAN: 18
GFR AFRICAN AMERICAN: 19
GFR AFRICAN AMERICAN: 19.1
GFR AFRICAN AMERICAN: 19.1
GFR AFRICAN AMERICAN: 19.6
GFR AFRICAN AMERICAN: 19.8
GFR AFRICAN AMERICAN: 19.8
GFR AFRICAN AMERICAN: 19.9
GFR AFRICAN AMERICAN: 20
GFR AFRICAN AMERICAN: 20
GFR AFRICAN AMERICAN: 20.1
GFR AFRICAN AMERICAN: 20.4
GFR AFRICAN AMERICAN: 20.5
GFR AFRICAN AMERICAN: 21
GFR AFRICAN AMERICAN: 21.1
GFR AFRICAN AMERICAN: 21.5
GFR AFRICAN AMERICAN: 21.5
GFR AFRICAN AMERICAN: 21.6
GFR AFRICAN AMERICAN: 21.6
GFR AFRICAN AMERICAN: 21.7
GFR AFRICAN AMERICAN: 22.1
GFR AFRICAN AMERICAN: 22.5
GFR AFRICAN AMERICAN: 23
GFR AFRICAN AMERICAN: 23.3
GFR AFRICAN AMERICAN: 23.9
GFR AFRICAN AMERICAN: 24
GFR AFRICAN AMERICAN: 24.2
GFR AFRICAN AMERICAN: 24.2
GFR AFRICAN AMERICAN: 24.8
GFR AFRICAN AMERICAN: 25
GFR AFRICAN AMERICAN: 25
GFR AFRICAN AMERICAN: 25.7
GFR AFRICAN AMERICAN: 25.9
GFR AFRICAN AMERICAN: 26.5
GFR AFRICAN AMERICAN: 27
GFR AFRICAN AMERICAN: 28
GFR AFRICAN AMERICAN: 28
GFR AFRICAN AMERICAN: 28.6
GFR NON-AFRICAN AMERICAN: 14.4
GFR NON-AFRICAN AMERICAN: 15
GFR NON-AFRICAN AMERICAN: 15
GFR NON-AFRICAN AMERICAN: 15.8
GFR NON-AFRICAN AMERICAN: 15.8
GFR NON-AFRICAN AMERICAN: 16.2
GFR NON-AFRICAN AMERICAN: 16.3
GFR NON-AFRICAN AMERICAN: 16.3
GFR NON-AFRICAN AMERICAN: 16.5
GFR NON-AFRICAN AMERICAN: 16.5
GFR NON-AFRICAN AMERICAN: 16.6
GFR NON-AFRICAN AMERICAN: 16.9
GFR NON-AFRICAN AMERICAN: 17
GFR NON-AFRICAN AMERICAN: 17.4
GFR NON-AFRICAN AMERICAN: 17.8
GFR NON-AFRICAN AMERICAN: 17.8
GFR NON-AFRICAN AMERICAN: 17.9
GFR NON-AFRICAN AMERICAN: 18.3
GFR NON-AFRICAN AMERICAN: 18.6
GFR NON-AFRICAN AMERICAN: 19
GFR NON-AFRICAN AMERICAN: 19.3
GFR NON-AFRICAN AMERICAN: 19.8
GFR NON-AFRICAN AMERICAN: 20
GFR NON-AFRICAN AMERICAN: 20.5
GFR NON-AFRICAN AMERICAN: 21
GFR NON-AFRICAN AMERICAN: 21
GFR NON-AFRICAN AMERICAN: 21.3
GFR NON-AFRICAN AMERICAN: 21.4
GFR NON-AFRICAN AMERICAN: 21.9
GFR NON-AFRICAN AMERICAN: 22
GFR NON-AFRICAN AMERICAN: 23
GFR NON-AFRICAN AMERICAN: 23.2
GFR NON-AFRICAN AMERICAN: 23.7
GLOBULIN: 3 G/DL (ref 2.3–3.5)
GLOBULIN: 3.1 G/DL (ref 2.3–3.5)
GLOBULIN: 3.2 G/DL (ref 2.3–3.5)
GLOBULIN: 3.3 G/DL (ref 2.3–3.5)
GLOBULIN: 3.4 G/DL (ref 2.3–3.5)
GLOBULIN: 3.5 G/DL (ref 2.3–3.5)
GLOBULIN: 3.6 G/DL (ref 2.3–3.5)
GLOBULIN: 3.8 G/DL (ref 2.3–3.5)
GLUCOSE BLD-MCNC: 100 MG/DL (ref 60–115)
GLUCOSE BLD-MCNC: 100 MG/DL (ref 60–115)
GLUCOSE BLD-MCNC: 102 MG/DL (ref 60–115)
GLUCOSE BLD-MCNC: 102 MG/DL (ref 70–99)
GLUCOSE BLD-MCNC: 102 MG/DL (ref 70–99)
GLUCOSE BLD-MCNC: 104 MG/DL (ref 60–115)
GLUCOSE BLD-MCNC: 106 MG/DL (ref 60–115)
GLUCOSE BLD-MCNC: 106 MG/DL (ref 70–99)
GLUCOSE BLD-MCNC: 108 MG/DL (ref 70–99)
GLUCOSE BLD-MCNC: 113 MG/DL (ref 60–115)
GLUCOSE BLD-MCNC: 117 MG/DL (ref 70–99)
GLUCOSE BLD-MCNC: 124 MG/DL (ref 70–99)
GLUCOSE BLD-MCNC: 135 MG/DL (ref 60–115)
GLUCOSE BLD-MCNC: 137 MG/DL (ref 60–115)
GLUCOSE BLD-MCNC: 139 MG/DL (ref 70–99)
GLUCOSE BLD-MCNC: 148 MG/DL (ref 60–115)
GLUCOSE BLD-MCNC: 150 MG/DL (ref 70–99)
GLUCOSE BLD-MCNC: 221 MG/DL (ref 60–115)
GLUCOSE BLD-MCNC: 70 MG/DL (ref 70–99)
GLUCOSE BLD-MCNC: 71 MG/DL (ref 70–99)
GLUCOSE BLD-MCNC: 74 MG/DL (ref 70–99)
GLUCOSE BLD-MCNC: 76 MG/DL (ref 70–99)
GLUCOSE BLD-MCNC: 77 MG/DL (ref 60–115)
GLUCOSE BLD-MCNC: 77 MG/DL (ref 70–99)
GLUCOSE BLD-MCNC: 78 MG/DL (ref 70–99)
GLUCOSE BLD-MCNC: 80 MG/DL (ref 60–115)
GLUCOSE BLD-MCNC: 80 MG/DL (ref 70–99)
GLUCOSE BLD-MCNC: 80 MG/DL (ref 70–99)
GLUCOSE BLD-MCNC: 82 MG/DL (ref 70–99)
GLUCOSE BLD-MCNC: 83 MG/DL (ref 60–115)
GLUCOSE BLD-MCNC: 83 MG/DL (ref 60–115)
GLUCOSE BLD-MCNC: 83 MG/DL (ref 70–99)
GLUCOSE BLD-MCNC: 83 MG/DL (ref 70–99)
GLUCOSE BLD-MCNC: 90 MG/DL (ref 70–99)
GLUCOSE BLD-MCNC: 91 MG/DL (ref 70–99)
GLUCOSE BLD-MCNC: 92 MG/DL (ref 70–99)
GLUCOSE BLD-MCNC: 92 MG/DL (ref 70–99)
GLUCOSE BLD-MCNC: 93 MG/DL (ref 60–115)
GLUCOSE BLD-MCNC: 93 MG/DL (ref 60–115)
GLUCOSE BLD-MCNC: 96 MG/DL (ref 60–115)
GLUCOSE BLD-MCNC: 97 MG/DL (ref 70–99)
GLUCOSE BLD-MCNC: 97 MG/DL (ref 70–99)
GLUCOSE BLD-MCNC: 98 MG/DL (ref 60–115)
GLUCOSE BLD-MCNC: 99 MG/DL (ref 60–115)
GLUCOSE BLD-MCNC: 99 MG/DL (ref 70–99)
GLUCOSE URINE: 100 MG/DL
GLUCOSE URINE: NEGATIVE MG/DL
GLUCOSE, CSF: 58 MG/DL (ref 50–70)
GRAM STAIN RESULT: ABNORMAL
GRAM STAIN RESULT: NORMAL
HCO3 ARTERIAL: 18.6 MMOL/L (ref 21–29)
HCO3 ARTERIAL: 19 MMOL/L (ref 21–29)
HCO3 ARTERIAL: 19.6 MMOL/L (ref 21–29)
HCO3 ARTERIAL: 22.2 MMOL/L (ref 21–29)
HCO3 ARTERIAL: 22.3 MMOL/L (ref 21–29)
HCO3 ARTERIAL: 22.3 MMOL/L (ref 21–29)
HCO3 ARTERIAL: 24.5 MMOL/L (ref 21–29)
HCO3 ARTERIAL: 24.6 MMOL/L (ref 21–29)
HCO3 ARTERIAL: 24.7 MMOL/L (ref 21–29)
HCO3 VENOUS: 22.3 MMOL/L (ref 23–29)
HCT VFR BLD CALC: 19.9 % (ref 37–47)
HCT VFR BLD CALC: 20.6 % (ref 37–47)
HCT VFR BLD CALC: 21.6 % (ref 37–47)
HCT VFR BLD CALC: 21.7 % (ref 37–47)
HCT VFR BLD CALC: 22.1 % (ref 37–47)
HCT VFR BLD CALC: 22.3 % (ref 37–47)
HCT VFR BLD CALC: 22.5 % (ref 37–47)
HCT VFR BLD CALC: 22.8 % (ref 37–47)
HCT VFR BLD CALC: 23 % (ref 37–47)
HCT VFR BLD CALC: 23.2 % (ref 37–47)
HCT VFR BLD CALC: 23.2 % (ref 37–47)
HCT VFR BLD CALC: 23.7 % (ref 37–47)
HCT VFR BLD CALC: 23.8 % (ref 37–47)
HCT VFR BLD CALC: 23.9 % (ref 37–47)
HCT VFR BLD CALC: 23.9 % (ref 37–47)
HCT VFR BLD CALC: 24.2 % (ref 37–47)
HCT VFR BLD CALC: 24.3 % (ref 37–47)
HCT VFR BLD CALC: 24.4 % (ref 37–47)
HCT VFR BLD CALC: 24.7 % (ref 37–47)
HCT VFR BLD CALC: 24.8 % (ref 37–47)
HCT VFR BLD CALC: 24.8 % (ref 37–47)
HCT VFR BLD CALC: 25 % (ref 37–47)
HCT VFR BLD CALC: 25 % (ref 37–47)
HCT VFR BLD CALC: 25.2 % (ref 37–47)
HCT VFR BLD CALC: 25.3 % (ref 37–47)
HCT VFR BLD CALC: 25.4 % (ref 37–47)
HCT VFR BLD CALC: 25.6 % (ref 37–47)
HCT VFR BLD CALC: 25.9 % (ref 37–47)
HCT VFR BLD CALC: 26.4 % (ref 37–47)
HCT VFR BLD CALC: 26.6 % (ref 37–47)
HCT VFR BLD CALC: 27.1 % (ref 37–47)
HCT VFR BLD CALC: 27.1 % (ref 37–47)
HCT VFR BLD CALC: 27.5 % (ref 37–47)
HCT VFR BLD CALC: 27.5 % (ref 37–47)
HCT VFR BLD CALC: 27.7 % (ref 37–47)
HCT VFR BLD CALC: 27.7 % (ref 37–47)
HCT VFR BLD CALC: 28 % (ref 37–47)
HCT VFR BLD CALC: 28.1 % (ref 37–47)
HCT VFR BLD CALC: 28.3 % (ref 37–47)
HCT VFR BLD CALC: 28.4 % (ref 37–47)
HCT VFR BLD CALC: 28.4 % (ref 37–47)
HCT VFR BLD CALC: 28.7 % (ref 37–47)
HCT VFR BLD CALC: 29 % (ref 37–47)
HCT VFR BLD CALC: 29.7 % (ref 37–47)
HCT VFR BLD CALC: 29.9 % (ref 37–47)
HCT VFR BLD CALC: 30.5 % (ref 37–47)
HCT VFR BLD CALC: 30.7 % (ref 37–47)
HCT VFR BLD CALC: 30.9 % (ref 37–47)
HCT VFR BLD CALC: 31.1 % (ref 37–47)
HCT VFR BLD CALC: 31.3 % (ref 37–47)
HCT VFR BLD CALC: 32.4 % (ref 37–47)
HCT VFR BLD CALC: 36.8 % (ref 37–47)
HDLC SERPL-MCNC: 80 MG/DL (ref 40–59)
HEMOCCULT STL QL: POSITIVE
HEMOGLOBIN: 10 G/DL (ref 12–16)
HEMOGLOBIN: 10.4 GM/DL (ref 12–16)
HEMOGLOBIN: 10.7 GM/DL (ref 12–16)
HEMOGLOBIN: 10.7 GM/DL (ref 12–16)
HEMOGLOBIN: 11.7 G/DL (ref 12–16)
HEMOGLOBIN: 6.5 G/DL (ref 12–16)
HEMOGLOBIN: 6.5 G/DL (ref 12–16)
HEMOGLOBIN: 6.6 G/DL (ref 12–16)
HEMOGLOBIN: 6.9 G/DL (ref 12–16)
HEMOGLOBIN: 7 G/DL (ref 12–16)
HEMOGLOBIN: 7.1 G/DL (ref 12–16)
HEMOGLOBIN: 7.2 G/DL (ref 12–16)
HEMOGLOBIN: 7.2 GM/DL (ref 12–16)
HEMOGLOBIN: 7.3 G/DL (ref 12–16)
HEMOGLOBIN: 7.4 G/DL (ref 12–16)
HEMOGLOBIN: 7.6 G/DL (ref 12–16)
HEMOGLOBIN: 7.7 G/DL (ref 12–16)
HEMOGLOBIN: 7.8 G/DL (ref 12–16)
HEMOGLOBIN: 7.9 G/DL (ref 12–16)
HEMOGLOBIN: 7.9 G/DL (ref 12–16)
HEMOGLOBIN: 8 G/DL (ref 12–16)
HEMOGLOBIN: 8.1 G/DL (ref 12–16)
HEMOGLOBIN: 8.1 GM/DL (ref 12–16)
HEMOGLOBIN: 8.1 GM/DL (ref 12–16)
HEMOGLOBIN: 8.2 G/DL (ref 12–16)
HEMOGLOBIN: 8.2 G/DL (ref 12–16)
HEMOGLOBIN: 8.2 GM/DL (ref 12–16)
HEMOGLOBIN: 8.3 G/DL (ref 12–16)
HEMOGLOBIN: 8.4 G/DL (ref 12–16)
HEMOGLOBIN: 8.6 G/DL (ref 12–16)
HEMOGLOBIN: 8.6 GM/DL (ref 12–16)
HEMOGLOBIN: 8.6 GM/DL (ref 12–16)
HEMOGLOBIN: 8.7 G/DL (ref 12–16)
HEMOGLOBIN: 8.8 G/DL (ref 12–16)
HEMOGLOBIN: 8.9 G/DL (ref 12–16)
HEMOGLOBIN: 8.9 G/DL (ref 12–16)
HEMOGLOBIN: 9 G/DL (ref 12–16)
HEMOGLOBIN: 9.2 G/DL (ref 12–16)
HEMOGLOBIN: 9.3 G/DL (ref 12–16)
HEMOGLOBIN: 9.3 G/DL (ref 12–16)
HEMOGLOBIN: 9.4 G/DL (ref 12–16)
HEMOGLOBIN: 9.5 G/DL (ref 12–16)
HEMOGLOBIN: 9.5 G/DL (ref 12–16)
HEMOGLOBIN: 9.6 G/DL (ref 12–16)
HERPES SIMPLEX VIRUS BY PCR: NOT DETECTED
HSV SOURCE: NORMAL
HYALINE CASTS: NORMAL /HPF (ref 0–5)
INR BLD: 1
INR BLD: 1.1
INR BLD: 1.2
INR BLD: 1.4
INR BLD: 1.9
INR BLD: 1.9
INR BLD: 2
INR BLD: 2.1
INR BLD: 2.1
INR BLD: 2.2
INR BLD: 2.6
INR BLD: 2.7
INR BLD: 2.7
INR BLD: 2.9
INR BLD: 2.9
INR BLD: 3.3
INR BLD: 3.4
INR BLD: 3.4
INR BLD: 3.7
INR BLD: 3.8
INTERNATIONAL NORMALIZATION RATIO, POC: 1.1
INTERNATIONAL NORMALIZATION RATIO, POC: 1.2
INTERNATIONAL NORMALIZATION RATIO, POC: 1.6
INTERNATIONAL NORMALIZATION RATIO, POC: 1.8
INTERNATIONAL NORMALIZATION RATIO, POC: 2.4
INTERNATIONAL NORMALIZATION RATIO, POC: 2.5
INTERNATIONAL NORMALIZATION RATIO, POC: 2.5
INTERNATIONAL NORMALIZATION RATIO, POC: 2.7
INTERNATIONAL NORMALIZATION RATIO, POC: 2.9
INTERNATIONAL NORMALIZATION RATIO, POC: 3.4
KETONES, URINE: NEGATIVE MG/DL
KETONES, URINE: NEGATIVE MG/DL
LACTATE: 0.32 MMOL/L (ref 0.4–2)
LACTATE: 0.38 MMOL/L (ref 0.4–2)
LACTATE: 0.41 MMOL/L (ref 0.4–2)
LACTATE: 0.51 MMOL/L (ref 0.4–2)
LACTATE: 0.51 MMOL/L (ref 0.4–2)
LACTATE: 0.61 MMOL/L (ref 0.4–2)
LACTATE: 0.79 MMOL/L (ref 0.4–2)
LACTATE: 1.22 MMOL/L (ref 0.4–2)
LACTATE: <0.3 MMOL/L (ref 0.4–2)
LACTATE: <0.3 MMOL/L (ref 0.4–2)
LACTIC ACID, SEPSIS: 0.6 MMOL/L (ref 0.5–1.9)
LACTIC ACID: 1 MMOL/L (ref 0.5–2.2)
LACTIC ACID: 1.4 MMOL/L (ref 0.5–2.2)
LACTIC ACID: 4.4 MMOL/L (ref 0.5–2.2)
LDL CHOLESTEROL CALCULATED: 63 MG/DL (ref 0–129)
LEUKOCYTE ESTERASE, URINE: ABNORMAL
LEUKOCYTE ESTERASE, URINE: NEGATIVE
LV EF: 68 %
LVEF MODALITY: NORMAL
LYMPHOCYTES ABSOLUTE: 1 K/UL (ref 1–4.8)
LYMPHOCYTES ABSOLUTE: 1.2 K/UL (ref 1–4.8)
LYMPHOCYTES ABSOLUTE: 1.4 K/UL (ref 1–4.8)
LYMPHOCYTES ABSOLUTE: 1.5 K/UL (ref 1–4.8)
LYMPHOCYTES ABSOLUTE: 1.6 K/UL (ref 1–4.8)
LYMPHOCYTES ABSOLUTE: 1.7 K/UL (ref 1–4.8)
LYMPHOCYTES ABSOLUTE: 1.9 K/UL (ref 1–4.8)
LYMPHOCYTES ABSOLUTE: 2.1 K/UL (ref 1–4.8)
LYMPHOCYTES ABSOLUTE: 2.1 K/UL (ref 1–4.8)
LYMPHOCYTES ABSOLUTE: 2.2 K/UL (ref 1–4.8)
LYMPHOCYTES ABSOLUTE: 2.3 K/UL (ref 1–4.8)
LYMPHOCYTES ABSOLUTE: 2.4 K/UL (ref 1–4.8)
LYMPHOCYTES ABSOLUTE: 2.5 K/UL (ref 1–4.8)
LYMPHOCYTES ABSOLUTE: 2.7 K/UL (ref 1–4.8)
LYMPHOCYTES RELATIVE PERCENT: 11.5 %
LYMPHOCYTES RELATIVE PERCENT: 12 %
LYMPHOCYTES RELATIVE PERCENT: 14.5 %
LYMPHOCYTES RELATIVE PERCENT: 17.2 %
LYMPHOCYTES RELATIVE PERCENT: 17.3 %
LYMPHOCYTES RELATIVE PERCENT: 25.1 %
LYMPHOCYTES RELATIVE PERCENT: 25.4 %
LYMPHOCYTES RELATIVE PERCENT: 28.8 %
LYMPHOCYTES RELATIVE PERCENT: 28.9 %
LYMPHOCYTES RELATIVE PERCENT: 30.7 %
LYMPHOCYTES RELATIVE PERCENT: 30.7 %
LYMPHOCYTES RELATIVE PERCENT: 31.2 %
LYMPHOCYTES RELATIVE PERCENT: 31.2 %
LYMPHOCYTES RELATIVE PERCENT: 32.6 %
LYMPHOCYTES RELATIVE PERCENT: 32.8 %
LYMPHOCYTES RELATIVE PERCENT: 33.6 %
Lab: NORMAL
MAGNESIUM: 1.6 MG/DL (ref 1.7–2.4)
MAGNESIUM: 1.8 MG/DL (ref 1.7–2.4)
MAGNESIUM: 1.9 MG/DL (ref 1.7–2.4)
MAGNESIUM: 2 MG/DL (ref 1.7–2.4)
MAGNESIUM: 2.1 MG/DL (ref 1.7–2.4)
MAGNESIUM: 2.2 MG/DL (ref 1.7–2.4)
MAGNESIUM: 2.2 MG/DL (ref 1.7–2.4)
MAGNESIUM: 2.3 MG/DL (ref 1.7–2.4)
MAGNESIUM: 2.4 MG/DL (ref 1.7–2.4)
MAGNESIUM: 2.5 MG/DL (ref 1.7–2.4)
MCH RBC QN AUTO: 29 PG (ref 27–31.3)
MCH RBC QN AUTO: 29.1 PG (ref 27–31.3)
MCH RBC QN AUTO: 29.1 PG (ref 27–31.3)
MCH RBC QN AUTO: 29.2 PG (ref 27–31.3)
MCH RBC QN AUTO: 29.3 PG (ref 27–31.3)
MCH RBC QN AUTO: 29.4 PG (ref 27–31.3)
MCH RBC QN AUTO: 29.4 PG (ref 27–31.3)
MCH RBC QN AUTO: 29.5 PG (ref 27–31.3)
MCH RBC QN AUTO: 29.6 PG (ref 27–31.3)
MCH RBC QN AUTO: 29.7 PG (ref 27–31.3)
MCH RBC QN AUTO: 29.7 PG (ref 27–31.3)
MCH RBC QN AUTO: 29.8 PG (ref 27–31.3)
MCH RBC QN AUTO: 30.1 PG (ref 27–31.3)
MCH RBC QN AUTO: 30.2 PG (ref 27–31.3)
MCH RBC QN AUTO: 30.3 PG (ref 27–31.3)
MCHC RBC AUTO-ENTMCNC: 30.5 % (ref 33–37)
MCHC RBC AUTO-ENTMCNC: 30.6 % (ref 33–37)
MCHC RBC AUTO-ENTMCNC: 30.8 % (ref 33–37)
MCHC RBC AUTO-ENTMCNC: 30.9 % (ref 33–37)
MCHC RBC AUTO-ENTMCNC: 30.9 % (ref 33–37)
MCHC RBC AUTO-ENTMCNC: 31 % (ref 33–37)
MCHC RBC AUTO-ENTMCNC: 31.1 % (ref 33–37)
MCHC RBC AUTO-ENTMCNC: 31.2 % (ref 33–37)
MCHC RBC AUTO-ENTMCNC: 31.4 % (ref 33–37)
MCHC RBC AUTO-ENTMCNC: 31.5 % (ref 33–37)
MCHC RBC AUTO-ENTMCNC: 31.6 % (ref 33–37)
MCHC RBC AUTO-ENTMCNC: 31.7 % (ref 33–37)
MCHC RBC AUTO-ENTMCNC: 31.8 % (ref 33–37)
MCHC RBC AUTO-ENTMCNC: 31.8 % (ref 33–37)
MCHC RBC AUTO-ENTMCNC: 31.9 % (ref 33–37)
MCHC RBC AUTO-ENTMCNC: 32 % (ref 33–37)
MCHC RBC AUTO-ENTMCNC: 32.4 % (ref 33–37)
MCHC RBC AUTO-ENTMCNC: 32.4 % (ref 33–37)
MCHC RBC AUTO-ENTMCNC: 32.5 % (ref 33–37)
MCV RBC AUTO: 91.4 FL (ref 82–100)
MCV RBC AUTO: 92.2 FL (ref 82–100)
MCV RBC AUTO: 92.5 FL (ref 82–100)
MCV RBC AUTO: 92.9 FL (ref 82–100)
MCV RBC AUTO: 92.9 FL (ref 82–100)
MCV RBC AUTO: 93.1 FL (ref 82–100)
MCV RBC AUTO: 93.4 FL (ref 82–100)
MCV RBC AUTO: 93.5 FL (ref 82–100)
MCV RBC AUTO: 93.6 FL (ref 82–100)
MCV RBC AUTO: 93.8 FL (ref 82–100)
MCV RBC AUTO: 94.1 FL (ref 82–100)
MCV RBC AUTO: 94.1 FL (ref 82–100)
MCV RBC AUTO: 94.2 FL (ref 82–100)
MCV RBC AUTO: 94.5 FL (ref 82–100)
MCV RBC AUTO: 94.5 FL (ref 82–100)
MCV RBC AUTO: 94.8 FL (ref 82–100)
MCV RBC AUTO: 94.9 FL (ref 82–100)
MCV RBC AUTO: 94.9 FL (ref 82–100)
MCV RBC AUTO: 95 FL (ref 82–100)
MCV RBC AUTO: 95.1 FL (ref 82–100)
MCV RBC AUTO: 95.2 FL (ref 82–100)
MCV RBC AUTO: 95.2 FL (ref 82–100)
MCV RBC AUTO: 95.8 FL (ref 82–100)
MCV RBC AUTO: 95.8 FL (ref 82–100)
MCV RBC AUTO: 96.5 FL (ref 82–100)
MCV RBC AUTO: 96.7 FL (ref 82–100)
METHADONE SCREEN, URINE: NORMAL
MONOCYTES ABSOLUTE: 0.3 K/UL (ref 0.2–0.8)
MONOCYTES ABSOLUTE: 0.3 K/UL (ref 0.2–0.8)
MONOCYTES ABSOLUTE: 0.4 K/UL (ref 0.2–0.8)
MONOCYTES ABSOLUTE: 0.5 K/UL (ref 0.2–0.8)
MONOCYTES ABSOLUTE: 0.6 K/UL (ref 0.2–0.8)
MONOCYTES ABSOLUTE: 0.6 K/UL (ref 0.2–0.8)
MONOCYTES RELATIVE PERCENT: 10.5 %
MONOCYTES RELATIVE PERCENT: 10.7 %
MONOCYTES RELATIVE PERCENT: 3.2 %
MONOCYTES RELATIVE PERCENT: 3.4 %
MONOCYTES RELATIVE PERCENT: 4.2 %
MONOCYTES RELATIVE PERCENT: 4.2 %
MONOCYTES RELATIVE PERCENT: 4.4 %
MONOCYTES RELATIVE PERCENT: 5.7 %
MONOCYTES RELATIVE PERCENT: 5.7 %
MONOCYTES RELATIVE PERCENT: 6 %
MONOCYTES RELATIVE PERCENT: 6.2 %
MONOCYTES RELATIVE PERCENT: 6.3 %
MONOCYTES RELATIVE PERCENT: 6.9 %
MONOCYTES RELATIVE PERCENT: 7.2 %
MONOCYTES RELATIVE PERCENT: 9.1 %
MONOCYTES RELATIVE PERCENT: 9.5 %
NEUTROPHILS ABSOLUTE: 2.8 K/UL (ref 1.4–6.5)
NEUTROPHILS ABSOLUTE: 2.9 K/UL (ref 1.4–6.5)
NEUTROPHILS ABSOLUTE: 3.4 K/UL (ref 1.4–6.5)
NEUTROPHILS ABSOLUTE: 3.5 K/UL (ref 1.4–6.5)
NEUTROPHILS ABSOLUTE: 3.6 K/UL (ref 1.4–6.5)
NEUTROPHILS ABSOLUTE: 4 K/UL (ref 1.4–6.5)
NEUTROPHILS ABSOLUTE: 4.5 K/UL (ref 1.4–6.5)
NEUTROPHILS ABSOLUTE: 4.5 K/UL (ref 1.4–6.5)
NEUTROPHILS ABSOLUTE: 4.8 K/UL (ref 1.4–6.5)
NEUTROPHILS ABSOLUTE: 5.2 K/UL (ref 1.4–6.5)
NEUTROPHILS ABSOLUTE: 5.2 K/UL (ref 1.4–6.5)
NEUTROPHILS ABSOLUTE: 6.7 K/UL (ref 1.4–6.5)
NEUTROPHILS ABSOLUTE: 6.9 K/UL (ref 1.4–6.5)
NEUTROPHILS ABSOLUTE: 7.1 K/UL (ref 1.4–6.5)
NEUTROPHILS ABSOLUTE: 7.6 K/UL (ref 1.4–6.5)
NEUTROPHILS ABSOLUTE: 8.4 K/UL (ref 1.4–6.5)
NEUTROPHILS RELATIVE PERCENT: 53.7 %
NEUTROPHILS RELATIVE PERCENT: 55.5 %
NEUTROPHILS RELATIVE PERCENT: 57.1 %
NEUTROPHILS RELATIVE PERCENT: 57.4 %
NEUTROPHILS RELATIVE PERCENT: 58.2 %
NEUTROPHILS RELATIVE PERCENT: 58.9 %
NEUTROPHILS RELATIVE PERCENT: 60.2 %
NEUTROPHILS RELATIVE PERCENT: 62.6 %
NEUTROPHILS RELATIVE PERCENT: 62.8 %
NEUTROPHILS RELATIVE PERCENT: 63.1 %
NEUTROPHILS RELATIVE PERCENT: 70.4 %
NEUTROPHILS RELATIVE PERCENT: 75.3 %
NEUTROPHILS RELATIVE PERCENT: 77.9 %
NEUTROPHILS RELATIVE PERCENT: 81.7 %
NEUTROPHILS RELATIVE PERCENT: 83 %
NEUTROPHILS RELATIVE PERCENT: 83.4 %
NITRITE, URINE: NEGATIVE
NITRITE, URINE: NEGATIVE
NO DIFFERENTIAL CSF: NORMAL
O2 SAT, ARTERIAL: 100 % (ref 93–100)
O2 SAT, ARTERIAL: 66 % (ref 93–100)
O2 SAT, ARTERIAL: 76 % (ref 93–100)
O2 SAT, ARTERIAL: 90 % (ref 93–100)
O2 SAT, ARTERIAL: 95 % (ref 93–100)
O2 SAT, ARTERIAL: 96 % (ref 93–100)
O2 SAT, ARTERIAL: 98 % (ref 93–100)
O2 SAT, ARTERIAL: 99 % (ref 93–100)
O2 SAT, ARTERIAL: 99 % (ref 93–100)
O2 SAT, VEN: 45 %
OPIATE SCREEN URINE: NORMAL
ORGANISM: ABNORMAL
PCO2 ARTERIAL: 37 MM HG (ref 35–45)
PCO2 ARTERIAL: 37 MM HG (ref 35–45)
PCO2 ARTERIAL: 43 MM HG (ref 35–45)
PCO2 ARTERIAL: 45 MM HG (ref 35–45)
PCO2 ARTERIAL: 46 MM HG (ref 35–45)
PCO2 ARTERIAL: 47 MM HG (ref 35–45)
PCO2 ARTERIAL: 48 MM HG (ref 35–45)
PCO2 ARTERIAL: 58 MM HG (ref 35–45)
PCO2 ARTERIAL: 60 MM HG (ref 35–45)
PCO2, VEN: 51.6 MM HG (ref 40–50)
PDW BLD-RTO: 17.5 % (ref 11.5–14.5)
PDW BLD-RTO: 17.6 % (ref 11.5–14.5)
PDW BLD-RTO: 17.7 % (ref 11.5–14.5)
PDW BLD-RTO: 18 % (ref 11.5–14.5)
PDW BLD-RTO: 18.1 % (ref 11.5–14.5)
PDW BLD-RTO: 18.2 % (ref 11.5–14.5)
PDW BLD-RTO: 18.3 % (ref 11.5–14.5)
PDW BLD-RTO: 18.4 % (ref 11.5–14.5)
PDW BLD-RTO: 18.8 % (ref 11.5–14.5)
PDW BLD-RTO: 18.9 % (ref 11.5–14.5)
PDW BLD-RTO: 19.8 % (ref 11.5–14.5)
PDW BLD-RTO: 20.1 % (ref 11.5–14.5)
PDW BLD-RTO: 21.1 % (ref 11.5–14.5)
PDW BLD-RTO: 21.2 % (ref 11.5–14.5)
PDW BLD-RTO: 21.3 % (ref 11.5–14.5)
PDW BLD-RTO: 21.4 % (ref 11.5–14.5)
PDW BLD-RTO: 21.5 % (ref 11.5–14.5)
PDW BLD-RTO: 21.7 % (ref 11.5–14.5)
PDW BLD-RTO: 22.2 % (ref 11.5–14.5)
PERFORMED ON: ABNORMAL
PERFORMED ON: NORMAL
PH ARTERIAL: 7.21 (ref 7.35–7.45)
PH ARTERIAL: 7.22 (ref 7.35–7.45)
PH ARTERIAL: 7.24 (ref 7.35–7.45)
PH ARTERIAL: 7.27 (ref 7.35–7.45)
PH ARTERIAL: 7.28 (ref 7.35–7.45)
PH ARTERIAL: 7.29 (ref 7.35–7.45)
PH ARTERIAL: 7.33 (ref 7.35–7.45)
PH ARTERIAL: 7.35 (ref 7.35–7.45)
PH ARTERIAL: 7.39 (ref 7.35–7.45)
PH UA: 5 (ref 5–9)
PH UA: 7 (ref 5–9)
PH VENOUS: 7.24 (ref 7.35–7.45)
PHENCYCLIDINE SCREEN URINE: NORMAL
PHOSPHORUS: 3.5 MG/DL (ref 2.3–4.8)
PHOSPHORUS: 3.8 MG/DL (ref 2.3–4.8)
PHOSPHORUS: 3.9 MG/DL (ref 2.3–4.8)
PHOSPHORUS: 4.1 MG/DL (ref 2.3–4.8)
PHOSPHORUS: 4.4 MG/DL (ref 2.3–4.8)
PHOSPHORUS: 4.6 MG/DL (ref 2.3–4.8)
PHOSPHORUS: 4.8 MG/DL (ref 2.3–4.8)
PHOSPHORUS: 5.2 MG/DL (ref 2.3–4.8)
PHOSPHORUS: 5.3 MG/DL (ref 2.3–4.8)
PHOSPHORUS: 5.4 MG/DL (ref 2.3–4.8)
PHOSPHORUS: 5.5 MG/DL (ref 2.3–4.8)
PHOSPHORUS: 6 MG/DL (ref 2.3–4.8)
PHOSPHORUS: 6 MG/DL (ref 2.3–4.8)
PHOSPHORUS: 6.5 MG/DL (ref 2.3–4.8)
PLATELET # BLD: 158 K/UL (ref 130–400)
PLATELET # BLD: 168 K/UL (ref 130–400)
PLATELET # BLD: 174 K/UL (ref 130–400)
PLATELET # BLD: 176 K/UL (ref 130–400)
PLATELET # BLD: 204 K/UL (ref 130–400)
PLATELET # BLD: 207 K/UL (ref 130–400)
PLATELET # BLD: 212 K/UL (ref 130–400)
PLATELET # BLD: 228 K/UL (ref 130–400)
PLATELET # BLD: 231 K/UL (ref 130–400)
PLATELET # BLD: 235 K/UL (ref 130–400)
PLATELET # BLD: 241 K/UL (ref 130–400)
PLATELET # BLD: 248 K/UL (ref 130–400)
PLATELET # BLD: 249 K/UL (ref 130–400)
PLATELET # BLD: 250 K/UL (ref 130–400)
PLATELET # BLD: 260 K/UL (ref 130–400)
PLATELET # BLD: 270 K/UL (ref 130–400)
PLATELET # BLD: 277 K/UL (ref 130–400)
PLATELET # BLD: 281 K/UL (ref 130–400)
PLATELET # BLD: 281 K/UL (ref 130–400)
PLATELET # BLD: 291 K/UL (ref 130–400)
PLATELET # BLD: 296 K/UL (ref 130–400)
PLATELET # BLD: 318 K/UL (ref 130–400)
PLATELET # BLD: 318 K/UL (ref 130–400)
PLATELET # BLD: 334 K/UL (ref 130–400)
PLATELET # BLD: 335 K/UL (ref 130–400)
PLATELET # BLD: 344 K/UL (ref 130–400)
PLATELET # BLD: 353 K/UL (ref 130–400)
PLATELET # BLD: 356 K/UL (ref 130–400)
PLATELET # BLD: 364 K/UL (ref 130–400)
PLATELET # BLD: 368 K/UL (ref 130–400)
PO2 ARTERIAL: 111 MM HG (ref 75–108)
PO2 ARTERIAL: 123 MM HG (ref 75–108)
PO2 ARTERIAL: 144 MM HG (ref 75–108)
PO2 ARTERIAL: 200 MM HG (ref 75–108)
PO2 ARTERIAL: 39 MM HG (ref 75–108)
PO2 ARTERIAL: 46 MM HG (ref 75–108)
PO2 ARTERIAL: 71 MM HG (ref 75–108)
PO2 ARTERIAL: 73 MM HG (ref 75–108)
PO2 ARTERIAL: 89 MM HG (ref 75–108)
PO2, VEN: 29 MM HG
POC CHLORIDE: 107 MEQ/L (ref 99–110)
POC CHLORIDE: 108 MEQ/L (ref 99–110)
POC CHLORIDE: 111 MEQ/L (ref 99–110)
POC CHLORIDE: 111 MEQ/L (ref 99–110)
POC CHLORIDE: 112 MEQ/L (ref 99–110)
POC CHLORIDE: 112 MEQ/L (ref 99–110)
POC CHLORIDE: 113 MEQ/L (ref 99–110)
POC CHLORIDE: 113 MEQ/L (ref 99–110)
POC CHLORIDE: 116 MEQ/L (ref 99–110)
POC CREATININE: 2.1 MG/DL (ref 0.6–1.2)
POC CREATININE: 2.2 MG/DL (ref 0.6–1.2)
POC CREATININE: 2.3 MG/DL (ref 0.6–1.2)
POC CREATININE: 2.3 MG/DL (ref 0.6–1.2)
POC CREATININE: 2.4 MG/DL (ref 0.6–1.2)
POC CREATININE: 2.7 MG/DL (ref 0.6–1.2)
POC CREATININE: 2.8 MG/DL (ref 0.6–1.2)
POC CREATININE: 3 MG/DL (ref 0.6–1.2)
POC CREATININE: 3.1 MG/DL (ref 0.6–1.2)
POC FIO2: 100
POC FIO2: 35
POC FIO2: 50
POC FIO2: 60
POC FIO2: 65
POC FIO2: 70
POC FIO2: 75
POC HEMATOCRIT: 21 % (ref 36–48)
POC HEMATOCRIT: 24 % (ref 36–48)
POC HEMATOCRIT: 25 % (ref 36–48)
POC HEMATOCRIT: 25 % (ref 36–48)
POC HEMATOCRIT: 31 % (ref 36–48)
POC HEMATOCRIT: 31 % (ref 36–48)
POC HEMATOCRIT: 32 % (ref 36–48)
POC POTASSIUM: 3.1 MEQ/L (ref 3.5–5.1)
POC POTASSIUM: 3.4 MEQ/L (ref 3.5–5.1)
POC POTASSIUM: 3.5 MEQ/L (ref 3.5–5.1)
POC POTASSIUM: 3.5 MEQ/L (ref 3.5–5.1)
POC POTASSIUM: 3.7 MEQ/L (ref 3.5–5.1)
POC POTASSIUM: 3.8 MEQ/L (ref 3.5–5.1)
POC POTASSIUM: 3.8 MEQ/L (ref 3.5–5.1)
POC POTASSIUM: 4 MEQ/L (ref 3.5–5.1)
POC POTASSIUM: 4.2 MEQ/L (ref 3.5–5.1)
POC SAMPLE TYPE: ABNORMAL
POC SODIUM: 139 MEQ/L (ref 136–145)
POC SODIUM: 141 MEQ/L (ref 136–145)
POC SODIUM: 142 MEQ/L (ref 136–145)
POC SODIUM: 142 MEQ/L (ref 136–145)
POC SODIUM: 143 MEQ/L (ref 136–145)
POC SODIUM: 144 MEQ/L (ref 136–145)
POC SODIUM: 144 MEQ/L (ref 136–145)
POTASSIUM SERPL-SCNC: 3.2 MEQ/L (ref 3.4–4.9)
POTASSIUM SERPL-SCNC: 3.3 MEQ/L (ref 3.4–4.9)
POTASSIUM SERPL-SCNC: 3.4 MEQ/L (ref 3.4–4.9)
POTASSIUM SERPL-SCNC: 3.4 MEQ/L (ref 3.4–4.9)
POTASSIUM SERPL-SCNC: 3.5 MEQ/L (ref 3.4–4.9)
POTASSIUM SERPL-SCNC: 3.5 MEQ/L (ref 3.4–4.9)
POTASSIUM SERPL-SCNC: 3.7 MEQ/L (ref 3.4–4.9)
POTASSIUM SERPL-SCNC: 3.9 MEQ/L (ref 3.4–4.9)
POTASSIUM SERPL-SCNC: 3.9 MEQ/L (ref 3.4–4.9)
POTASSIUM SERPL-SCNC: 4 MEQ/L (ref 3.4–4.9)
POTASSIUM SERPL-SCNC: 4.2 MEQ/L (ref 3.4–4.9)
POTASSIUM SERPL-SCNC: 4.2 MEQ/L (ref 3.4–4.9)
POTASSIUM SERPL-SCNC: 4.3 MEQ/L (ref 3.4–4.9)
POTASSIUM SERPL-SCNC: 4.3 MEQ/L (ref 3.4–4.9)
POTASSIUM SERPL-SCNC: 4.5 MEQ/L (ref 3.4–4.9)
POTASSIUM SERPL-SCNC: 4.5 MEQ/L (ref 3.4–4.9)
POTASSIUM SERPL-SCNC: 4.6 MEQ/L (ref 3.4–4.9)
POTASSIUM SERPL-SCNC: 4.7 MEQ/L (ref 3.4–4.9)
POTASSIUM SERPL-SCNC: 4.8 MEQ/L (ref 3.4–4.9)
POTASSIUM SERPL-SCNC: 4.8 MEQ/L (ref 3.4–4.9)
POTASSIUM SERPL-SCNC: 4.9 MEQ/L (ref 3.4–4.9)
POTASSIUM SERPL-SCNC: 4.9 MEQ/L (ref 3.4–4.9)
POTASSIUM SERPL-SCNC: 5 MEQ/L (ref 3.4–4.9)
PRO-BNP: NORMAL PG/ML
PROCALCITONIN: 0.13 NG/ML (ref 0–0.15)
PROCALCITONIN: 0.28 NG/ML (ref 0–0.15)
PROPOXYPHENE SCREEN, URINE: NORMAL
PROTEIN CSF: 50 MG/DL (ref 15–45)
PROTEIN PROTEIN: 157 MG/DL
PROTEIN UA: >=300 MG/DL
PROTEIN UA: >=300 MG/DL
PROTEIN/CREAT RATIO: 3.9 ML/ML
PROTEIN/CREAT RATIO: 3.9 ML/ML (ref 0–0.2)
PROTHROMBIN TIME: 13.3 SEC (ref 12.3–14.9)
PROTHROMBIN TIME: 13.8 SEC (ref 12.3–14.9)
PROTHROMBIN TIME: 13.8 SEC (ref 12.3–14.9)
PROTHROMBIN TIME: 14 SEC (ref 12.3–14.9)
PROTHROMBIN TIME: 14.4 SEC (ref 12.3–14.9)
PROTHROMBIN TIME: 14.4 SEC (ref 12.3–14.9)
PROTHROMBIN TIME: 14.6 SEC (ref 12.3–14.9)
PROTHROMBIN TIME: 15.4 SEC (ref 12.3–14.9)
PROTHROMBIN TIME: 15.4 SEC (ref 12.3–14.9)
PROTHROMBIN TIME: 15.8 SEC (ref 12.3–14.9)
PROTHROMBIN TIME: 17.6 SEC (ref 12.3–14.9)
PROTHROMBIN TIME: 22.4 SEC (ref 12.3–14.9)
PROTHROMBIN TIME: 22.5 SEC (ref 12.3–14.9)
PROTHROMBIN TIME: 24 SEC (ref 12.3–14.9)
PROTHROMBIN TIME: 24.4 SEC (ref 12.3–14.9)
PROTHROMBIN TIME: 24.6 SEC (ref 12.3–14.9)
PROTHROMBIN TIME: 25.1 SEC (ref 12.3–14.9)
PROTHROMBIN TIME: 29 SEC (ref 12.3–14.9)
PROTHROMBIN TIME: 29.7 SEC (ref 12.3–14.9)
PROTHROMBIN TIME: 29.9 SEC (ref 12.3–14.9)
PROTHROMBIN TIME: 31.4 SEC (ref 12.3–14.9)
PROTHROMBIN TIME: 31.9 SEC (ref 12.3–14.9)
PROTHROMBIN TIME: 34.6 SEC (ref 12.3–14.9)
PROTHROMBIN TIME: 35.5 SEC (ref 12.3–14.9)
PROTHROMBIN TIME: 35.6 SEC (ref 12.3–14.9)
PROTHROMBIN TIME: 38.8 SEC (ref 12.3–14.9)
PROTHROMBIN TIME: 39.1 SEC (ref 12.3–14.9)
RBC # BLD: 2.14 M/UL (ref 4.2–5.4)
RBC # BLD: 2.34 M/UL (ref 4.2–5.4)
RBC # BLD: 2.45 M/UL (ref 4.2–5.4)
RBC # BLD: 2.46 M/UL (ref 4.2–5.4)
RBC # BLD: 2.52 M/UL (ref 4.2–5.4)
RBC # BLD: 2.59 M/UL (ref 4.2–5.4)
RBC # BLD: 2.59 M/UL (ref 4.2–5.4)
RBC # BLD: 2.68 M/UL (ref 4.2–5.4)
RBC # BLD: 2.68 M/UL (ref 4.2–5.4)
RBC # BLD: 2.72 M/UL (ref 4.2–5.4)
RBC # BLD: 2.75 M/UL (ref 4.2–5.4)
RBC # BLD: 2.75 M/UL (ref 4.2–5.4)
RBC # BLD: 2.76 M/UL (ref 4.2–5.4)
RBC # BLD: 2.79 M/UL (ref 4.2–5.4)
RBC # BLD: 2.8 M/UL (ref 4.2–5.4)
RBC # BLD: 2.83 M/UL (ref 4.2–5.4)
RBC # BLD: 2.87 M/UL (ref 4.2–5.4)
RBC # BLD: 2.91 M/UL (ref 4.2–5.4)
RBC # BLD: 2.94 M/UL (ref 4.2–5.4)
RBC # BLD: 2.94 M/UL (ref 4.2–5.4)
RBC # BLD: 2.98 M/UL (ref 4.2–5.4)
RBC # BLD: 2.99 M/UL (ref 4.2–5.4)
RBC # BLD: 3.14 M/UL (ref 4.2–5.4)
RBC # BLD: 3.16 M/UL (ref 4.2–5.4)
RBC # BLD: 3.18 M/UL (ref 4.2–5.4)
RBC # BLD: 3.24 M/UL (ref 4.2–5.4)
RBC # BLD: 3.28 M/UL (ref 4.2–5.4)
RBC # BLD: 3.29 M/UL (ref 4.2–5.4)
RBC # BLD: 3.36 M/UL (ref 4.2–5.4)
RBC # BLD: 3.96 M/UL (ref 4.2–5.4)
RBC CSF: 2 K/UL
RBC UA: ABNORMAL /HPF (ref 0–2)
RBC UA: NORMAL /HPF (ref 0–5)
SODIUM BLD-SCNC: 138 MEQ/L (ref 135–144)
SODIUM BLD-SCNC: 138 MEQ/L (ref 135–144)
SODIUM BLD-SCNC: 139 MEQ/L (ref 135–144)
SODIUM BLD-SCNC: 139 MEQ/L (ref 135–144)
SODIUM BLD-SCNC: 140 MEQ/L (ref 135–144)
SODIUM BLD-SCNC: 141 MEQ/L (ref 135–144)
SODIUM BLD-SCNC: 142 MEQ/L (ref 135–144)
SODIUM BLD-SCNC: 143 MEQ/L (ref 135–144)
SODIUM BLD-SCNC: 143 MEQ/L (ref 135–144)
SODIUM BLD-SCNC: 144 MEQ/L (ref 135–144)
SODIUM BLD-SCNC: 145 MEQ/L (ref 135–144)
SODIUM BLD-SCNC: 145 MEQ/L (ref 135–144)
SODIUM BLD-SCNC: 146 MEQ/L (ref 135–144)
SODIUM BLD-SCNC: 147 MEQ/L (ref 135–144)
SPECIFIC GRAVITY UA: 1.01 (ref 1–1.03)
SPECIFIC GRAVITY UA: 1.01 (ref 1–1.03)
TCO2 ARTERIAL: 20 (ref 22–29)
TCO2 ARTERIAL: 20 (ref 22–29)
TCO2 ARTERIAL: 21 (ref 22–29)
TCO2 ARTERIAL: 23 (ref 22–29)
TCO2 ARTERIAL: 24 (ref 22–29)
TCO2 ARTERIAL: 24 (ref 22–29)
TCO2 ARTERIAL: 26 (ref 22–29)
TCO2 CALC VENOUS: 24 MMOL/L
TOTAL CK: 33 U/L (ref 0–170)
TOTAL CK: 49 U/L (ref 0–170)
TOTAL PROTEIN: 5 G/DL (ref 6.3–8)
TOTAL PROTEIN: 5.1 G/DL (ref 6.3–8)
TOTAL PROTEIN: 5.2 G/DL (ref 6.3–8)
TOTAL PROTEIN: 5.3 G/DL (ref 6.3–8)
TOTAL PROTEIN: 5.3 G/DL (ref 6.3–8)
TOTAL PROTEIN: 5.4 G/DL (ref 6.3–8)
TOTAL PROTEIN: 5.5 G/DL (ref 6.3–8)
TOTAL PROTEIN: 5.5 G/DL (ref 6.3–8)
TOTAL PROTEIN: 5.6 G/DL (ref 6.3–8)
TOTAL PROTEIN: 5.7 G/DL (ref 6.3–8)
TOTAL PROTEIN: 5.7 G/DL (ref 6.3–8)
TOTAL PROTEIN: 5.8 G/DL (ref 6.3–8)
TOTAL PROTEIN: 5.8 G/DL (ref 6.3–8)
TOTAL PROTEIN: 5.9 G/DL (ref 6.3–8)
TOTAL PROTEIN: 5.9 G/DL (ref 6.3–8)
TOTAL PROTEIN: 6.1 G/DL (ref 6.3–8)
TOTAL PROTEIN: 6.2 G/DL (ref 6.3–8)
TOTAL PROTEIN: 6.7 G/DL (ref 6.3–8)
TRIGL SERPL-MCNC: 77 MG/DL (ref 0–150)
TROPONIN: 0.03 NG/ML (ref 0–0.01)
TROPONIN: 0.04 NG/ML (ref 0–0.01)
TROPONIN: 0.04 NG/ML (ref 0–0.01)
TROPONIN: 0.19 NG/ML (ref 0–0.01)
TROPONIN: 0.27 NG/ML (ref 0–0.01)
TUBE NUMBER CSF: NORMAL
UR OXYCODONE RAPID SCREEN: NORMAL
URINE CULTURE, ROUTINE: ABNORMAL
URINE CULTURE, ROUTINE: NORMAL
URINE REFLEX TO CULTURE: YES
URINE REFLEX TO CULTURE: YES
UROBILINOGEN, URINE: 0.2 E.U./DL
UROBILINOGEN, URINE: 0.2 E.U./DL
VOLUME CSF: 9.5 ML
WBC # BLD: 10.1 K/UL (ref 4.8–10.8)
WBC # BLD: 10.8 K/UL (ref 4.8–10.8)
WBC # BLD: 4.2 K/UL (ref 4.8–10.8)
WBC # BLD: 4.4 K/UL (ref 4.8–10.8)
WBC # BLD: 4.7 K/UL (ref 4.8–10.8)
WBC # BLD: 5.1 K/UL (ref 4.8–10.8)
WBC # BLD: 5.2 K/UL (ref 4.8–10.8)
WBC # BLD: 5.3 K/UL (ref 4.8–10.8)
WBC # BLD: 5.3 K/UL (ref 4.8–10.8)
WBC # BLD: 5.5 K/UL (ref 4.8–10.8)
WBC # BLD: 5.5 K/UL (ref 4.8–10.8)
WBC # BLD: 5.8 K/UL (ref 4.8–10.8)
WBC # BLD: 5.8 K/UL (ref 4.8–10.8)
WBC # BLD: 6 K/UL (ref 4.8–10.8)
WBC # BLD: 6 K/UL (ref 4.8–10.8)
WBC # BLD: 6.2 K/UL (ref 4.8–10.8)
WBC # BLD: 6.3 K/UL (ref 4.8–10.8)
WBC # BLD: 6.4 K/UL (ref 4.8–10.8)
WBC # BLD: 6.8 K/UL (ref 4.8–10.8)
WBC # BLD: 6.9 K/UL (ref 4.8–10.8)
WBC # BLD: 7.4 K/UL (ref 4.8–10.8)
WBC # BLD: 7.4 K/UL (ref 4.8–10.8)
WBC # BLD: 7.6 K/UL (ref 4.8–10.8)
WBC # BLD: 7.7 K/UL (ref 4.8–10.8)
WBC # BLD: 7.8 K/UL (ref 4.8–10.8)
WBC # BLD: 8 K/UL (ref 4.8–10.8)
WBC # BLD: 8.2 K/UL (ref 4.8–10.8)
WBC # BLD: 8.6 K/UL (ref 4.8–10.8)
WBC # BLD: 8.7 K/UL (ref 4.8–10.8)
WBC # BLD: 8.9 K/UL (ref 4.8–10.8)
WBC CSF: 1 K/UL (ref 0–8)
WBC UA: >100 /HPF (ref 0–5)
WBC UA: NORMAL /HPF (ref 0–5)
YEAST: PRESENT

## 2019-01-01 PROCEDURE — 92526 ORAL FUNCTION THERAPY: CPT

## 2019-01-01 PROCEDURE — 36415 COLL VENOUS BLD VENIPUNCTURE: CPT

## 2019-01-01 PROCEDURE — 2580000003 HC RX 258: Performed by: INTERNAL MEDICINE

## 2019-01-01 PROCEDURE — 6370000000 HC RX 637 (ALT 250 FOR IP): Performed by: NURSE PRACTITIONER

## 2019-01-01 PROCEDURE — 1210000000 HC MED SURG R&B

## 2019-01-01 PROCEDURE — 94761 N-INVAS EAR/PLS OXIMETRY MLT: CPT

## 2019-01-01 PROCEDURE — 2580000003 HC RX 258: Performed by: EMERGENCY MEDICINE

## 2019-01-01 PROCEDURE — 85610 PROTHROMBIN TIME: CPT

## 2019-01-01 PROCEDURE — 6370000000 HC RX 637 (ALT 250 FOR IP): Performed by: INTERNAL MEDICINE

## 2019-01-01 PROCEDURE — 84100 ASSAY OF PHOSPHORUS: CPT

## 2019-01-01 PROCEDURE — 99233 SBSQ HOSP IP/OBS HIGH 50: CPT | Performed by: PSYCHIATRY & NEUROLOGY

## 2019-01-01 PROCEDURE — 84132 ASSAY OF SERUM POTASSIUM: CPT

## 2019-01-01 PROCEDURE — 99291 CRITICAL CARE FIRST HOUR: CPT | Performed by: INTERNAL MEDICINE

## 2019-01-01 PROCEDURE — 94660 CPAP INITIATION&MGMT: CPT

## 2019-01-01 PROCEDURE — C9113 INJ PANTOPRAZOLE SODIUM, VIA: HCPCS | Performed by: INTERNAL MEDICINE

## 2019-01-01 PROCEDURE — P9016 RBC LEUKOCYTES REDUCED: HCPCS

## 2019-01-01 PROCEDURE — 71045 X-RAY EXAM CHEST 1 VIEW: CPT

## 2019-01-01 PROCEDURE — 83735 ASSAY OF MAGNESIUM: CPT

## 2019-01-01 PROCEDURE — 99211 OFF/OP EST MAY X REQ PHY/QHP: CPT | Performed by: PHARMACIST

## 2019-01-01 PROCEDURE — 93005 ELECTROCARDIOGRAM TRACING: CPT | Performed by: EMERGENCY MEDICINE

## 2019-01-01 PROCEDURE — 84295 ASSAY OF SERUM SODIUM: CPT

## 2019-01-01 PROCEDURE — 94664 DEMO&/EVAL PT USE INHALER: CPT

## 2019-01-01 PROCEDURE — 2000000000 HC ICU R&B

## 2019-01-01 PROCEDURE — 99222 1ST HOSP IP/OBS MODERATE 55: CPT | Performed by: INTERNAL MEDICINE

## 2019-01-01 PROCEDURE — 94640 AIRWAY INHALATION TREATMENT: CPT

## 2019-01-01 PROCEDURE — 85018 HEMOGLOBIN: CPT

## 2019-01-01 PROCEDURE — 82565 ASSAY OF CREATININE: CPT

## 2019-01-01 PROCEDURE — 6360000002 HC RX W HCPCS: Performed by: INTERNAL MEDICINE

## 2019-01-01 PROCEDURE — 93005 ELECTROCARDIOGRAM TRACING: CPT | Performed by: INTERNAL MEDICINE

## 2019-01-01 PROCEDURE — 3700000000 HC ANESTHESIA ATTENDED CARE: Performed by: INTERNAL MEDICINE

## 2019-01-01 PROCEDURE — 36430 TRANSFUSION BLD/BLD COMPNT: CPT

## 2019-01-01 PROCEDURE — 99232 SBSQ HOSP IP/OBS MODERATE 35: CPT | Performed by: PSYCHIATRY & NEUROLOGY

## 2019-01-01 PROCEDURE — 2700000000 HC OXYGEN THERAPY PER DAY

## 2019-01-01 PROCEDURE — 71046 X-RAY EXAM CHEST 2 VIEWS: CPT

## 2019-01-01 PROCEDURE — 70450 CT HEAD/BRAIN W/O DYE: CPT

## 2019-01-01 PROCEDURE — 85025 COMPLETE CBC W/AUTO DIFF WBC: CPT

## 2019-01-01 PROCEDURE — 80053 COMPREHEN METABOLIC PANEL: CPT

## 2019-01-01 PROCEDURE — 6360000002 HC RX W HCPCS: Performed by: NURSE PRACTITIONER

## 2019-01-01 PROCEDURE — 82330 ASSAY OF CALCIUM: CPT

## 2019-01-01 PROCEDURE — 82550 ASSAY OF CK (CPK): CPT

## 2019-01-01 PROCEDURE — 94760 N-INVAS EAR/PLS OXIMETRY 1: CPT

## 2019-01-01 PROCEDURE — 74176 CT ABD & PELVIS W/O CONTRAST: CPT

## 2019-01-01 PROCEDURE — 85730 THROMBOPLASTIN TIME PARTIAL: CPT

## 2019-01-01 PROCEDURE — 87086 URINE CULTURE/COLONY COUNT: CPT

## 2019-01-01 PROCEDURE — 85014 HEMATOCRIT: CPT

## 2019-01-01 PROCEDURE — 94668 MNPJ CHEST WALL SBSQ: CPT

## 2019-01-01 PROCEDURE — 99221 1ST HOSP IP/OBS SF/LOW 40: CPT | Performed by: COLON & RECTAL SURGERY

## 2019-01-01 PROCEDURE — 36589 REMOVAL TUNNELED CV CATH: CPT | Performed by: COLON & RECTAL SURGERY

## 2019-01-01 PROCEDURE — 99232 SBSQ HOSP IP/OBS MODERATE 35: CPT | Performed by: INTERNAL MEDICINE

## 2019-01-01 PROCEDURE — 84484 ASSAY OF TROPONIN QUANT: CPT

## 2019-01-01 PROCEDURE — 99213 OFFICE O/P EST LOW 20 MIN: CPT

## 2019-01-01 PROCEDURE — 5A1945Z RESPIRATORY VENTILATION, 24-96 CONSECUTIVE HOURS: ICD-10-PCS | Performed by: INTERNAL MEDICINE

## 2019-01-01 PROCEDURE — 6370000000 HC RX 637 (ALT 250 FOR IP): Performed by: EMERGENCY MEDICINE

## 2019-01-01 PROCEDURE — 2709999900 HC NON-CHARGEABLE SUPPLY: Performed by: INTERNAL MEDICINE

## 2019-01-01 PROCEDURE — 85027 COMPLETE CBC AUTOMATED: CPT

## 2019-01-01 PROCEDURE — 94002 VENT MGMT INPAT INIT DAY: CPT

## 2019-01-01 PROCEDURE — 02PA33Z REMOVAL OF INFUSION DEVICE FROM HEART, PERCUTANEOUS APPROACH: ICD-10-PCS | Performed by: COLON & RECTAL SURGERY

## 2019-01-01 PROCEDURE — 99223 1ST HOSP IP/OBS HIGH 75: CPT | Performed by: INTERNAL MEDICINE

## 2019-01-01 PROCEDURE — 80048 BASIC METABOLIC PNL TOTAL CA: CPT

## 2019-01-01 PROCEDURE — 80306 DRUG TEST PRSMV INSTRMNT: CPT

## 2019-01-01 PROCEDURE — 2500000003 HC RX 250 WO HCPCS: Performed by: COLON & RECTAL SURGERY

## 2019-01-01 PROCEDURE — 84156 ASSAY OF PROTEIN URINE: CPT

## 2019-01-01 PROCEDURE — 87070 CULTURE OTHR SPECIMN AEROBIC: CPT

## 2019-01-01 PROCEDURE — 87205 SMEAR GRAM STAIN: CPT

## 2019-01-01 PROCEDURE — 6370000000 HC RX 637 (ALT 250 FOR IP): Performed by: PSYCHIATRY & NEUROLOGY

## 2019-01-01 PROCEDURE — 87040 BLOOD CULTURE FOR BACTERIA: CPT

## 2019-01-01 PROCEDURE — 82803 BLOOD GASES ANY COMBINATION: CPT

## 2019-01-01 PROCEDURE — 82435 ASSAY OF BLOOD CHLORIDE: CPT

## 2019-01-01 PROCEDURE — 6360000002 HC RX W HCPCS: Performed by: STUDENT IN AN ORGANIZED HEALTH CARE EDUCATION/TRAINING PROGRAM

## 2019-01-01 PROCEDURE — 97535 SELF CARE MNGMENT TRAINING: CPT

## 2019-01-01 PROCEDURE — 89050 BODY FLUID CELL COUNT: CPT

## 2019-01-01 PROCEDURE — 94003 VENT MGMT INPAT SUBQ DAY: CPT

## 2019-01-01 PROCEDURE — 2500000003 HC RX 250 WO HCPCS: Performed by: INTERNAL MEDICINE

## 2019-01-01 PROCEDURE — 43235 EGD DIAGNOSTIC BRUSH WASH: CPT | Performed by: INTERNAL MEDICINE

## 2019-01-01 PROCEDURE — 36600 WITHDRAWAL OF ARTERIAL BLOOD: CPT

## 2019-01-01 PROCEDURE — 84145 PROCALCITONIN (PCT): CPT

## 2019-01-01 PROCEDURE — 6370000000 HC RX 637 (ALT 250 FOR IP): Performed by: STUDENT IN AN ORGANIZED HEALTH CARE EDUCATION/TRAINING PROGRAM

## 2019-01-01 PROCEDURE — 80076 HEPATIC FUNCTION PANEL: CPT

## 2019-01-01 PROCEDURE — 97166 OT EVAL MOD COMPLEX 45 MIN: CPT

## 2019-01-01 PROCEDURE — 87186 SC STD MICRODIL/AGAR DIL: CPT

## 2019-01-01 PROCEDURE — 2580000003 HC RX 258: Performed by: PSYCHIATRY & NEUROLOGY

## 2019-01-01 PROCEDURE — 83605 ASSAY OF LACTIC ACID: CPT

## 2019-01-01 PROCEDURE — 96367 TX/PROPH/DG ADDL SEQ IV INF: CPT

## 2019-01-01 PROCEDURE — 87077 CULTURE AEROBIC IDENTIFY: CPT

## 2019-01-01 PROCEDURE — 51702 INSERT TEMP BLADDER CATH: CPT

## 2019-01-01 PROCEDURE — 0BH18EZ INSERTION OF ENDOTRACHEAL AIRWAY INTO TRACHEA, VIA NATURAL OR ARTIFICIAL OPENING ENDOSCOPIC: ICD-10-PCS | Performed by: INTERNAL MEDICINE

## 2019-01-01 PROCEDURE — 81001 URINALYSIS AUTO W/SCOPE: CPT

## 2019-01-01 PROCEDURE — 86141 C-REACTIVE PROTEIN HS: CPT

## 2019-01-01 PROCEDURE — 97127 HC SP THER IVNTJ W/FOCUS COG FUNCJ: CPT

## 2019-01-01 PROCEDURE — 94799 UNLISTED PULMONARY SVC/PX: CPT

## 2019-01-01 PROCEDURE — 93010 ELECTROCARDIOGRAM REPORT: CPT | Performed by: INTERNAL MEDICINE

## 2019-01-01 PROCEDURE — 86850 RBC ANTIBODY SCREEN: CPT

## 2019-01-01 PROCEDURE — 97167 OT EVAL HIGH COMPLEX 60 MIN: CPT

## 2019-01-01 PROCEDURE — 97163 PT EVAL HIGH COMPLEX 45 MIN: CPT

## 2019-01-01 PROCEDURE — 31500 INSERT EMERGENCY AIRWAY: CPT

## 2019-01-01 PROCEDURE — 96375 TX/PRO/DX INJ NEW DRUG ADDON: CPT

## 2019-01-01 PROCEDURE — 86920 COMPATIBILITY TEST SPIN: CPT

## 2019-01-01 PROCEDURE — 97112 NEUROMUSCULAR REEDUCATION: CPT

## 2019-01-01 PROCEDURE — 6360000002 HC RX W HCPCS

## 2019-01-01 PROCEDURE — 96374 THER/PROPH/DIAG INJ IV PUSH: CPT

## 2019-01-01 PROCEDURE — 3700000001 HC ADD 15 MINUTES (ANESTHESIA): Performed by: INTERNAL MEDICINE

## 2019-01-01 PROCEDURE — 2500000003 HC RX 250 WO HCPCS: Performed by: NURSE PRACTITIONER

## 2019-01-01 PROCEDURE — 94667 MNPJ CHEST WALL 1ST: CPT

## 2019-01-01 PROCEDURE — 7100000001 HC PACU RECOVERY - ADDTL 15 MIN: Performed by: INTERNAL MEDICINE

## 2019-01-01 PROCEDURE — 99233 SBSQ HOSP IP/OBS HIGH 50: CPT | Performed by: INTERNAL MEDICINE

## 2019-01-01 PROCEDURE — 62270 DX LMBR SPI PNXR: CPT | Performed by: PSYCHIATRY & NEUROLOGY

## 2019-01-01 PROCEDURE — 93306 TTE W/DOPPLER COMPLETE: CPT

## 2019-01-01 PROCEDURE — 95816 EEG AWAKE AND DROWSY: CPT

## 2019-01-01 PROCEDURE — 2580000003 HC RX 258: Performed by: STUDENT IN AN ORGANIZED HEALTH CARE EDUCATION/TRAINING PROGRAM

## 2019-01-01 PROCEDURE — 82948 REAGENT STRIP/BLOOD GLUCOSE: CPT

## 2019-01-01 PROCEDURE — 84157 ASSAY OF PROTEIN OTHER: CPT

## 2019-01-01 PROCEDURE — 009U3ZX DRAINAGE OF SPINAL CANAL, PERCUTANEOUS APPROACH, DIAGNOSTIC: ICD-10-PCS | Performed by: PSYCHIATRY & NEUROLOGY

## 2019-01-01 PROCEDURE — 93970 EXTREMITY STUDY: CPT

## 2019-01-01 PROCEDURE — 6360000002 HC RX W HCPCS: Performed by: PSYCHIATRY & NEUROLOGY

## 2019-01-01 PROCEDURE — 92610 EVALUATE SWALLOWING FUNCTION: CPT

## 2019-01-01 PROCEDURE — 97162 PT EVAL MOD COMPLEX 30 MIN: CPT

## 2019-01-01 PROCEDURE — 86900 BLOOD TYPING SEROLOGIC ABO: CPT

## 2019-01-01 PROCEDURE — 89220 SPUTUM SPECIMEN COLLECTION: CPT

## 2019-01-01 PROCEDURE — 2500000003 HC RX 250 WO HCPCS

## 2019-01-01 PROCEDURE — 2580000003 HC RX 258: Performed by: NURSE PRACTITIONER

## 2019-01-01 PROCEDURE — 6360000002 HC RX W HCPCS: Performed by: EMERGENCY MEDICINE

## 2019-01-01 PROCEDURE — 5A1D70Z PERFORMANCE OF URINARY FILTRATION, INTERMITTENT, LESS THAN 6 HOURS PER DAY: ICD-10-PCS | Performed by: STUDENT IN AN ORGANIZED HEALTH CARE EDUCATION/TRAINING PROGRAM

## 2019-01-01 PROCEDURE — 3609017100 HC EGD: Performed by: INTERNAL MEDICINE

## 2019-01-01 PROCEDURE — 2709999900 HC NON-CHARGEABLE SUPPLY: Performed by: COLON & RECTAL SURGERY

## 2019-01-01 PROCEDURE — 3600000014 HC SURGERY LEVEL 4 ADDTL 15MIN: Performed by: COLON & RECTAL SURGERY

## 2019-01-01 PROCEDURE — 0BH18EZ INSERTION OF ENDOTRACHEAL AIRWAY INTO TRACHEA, VIA NATURAL OR ARTIFICIAL OPENING ENDOSCOPIC: ICD-10-PCS | Performed by: EMERGENCY MEDICINE

## 2019-01-01 PROCEDURE — 90935 HEMODIALYSIS ONE EVALUATION: CPT

## 2019-01-01 PROCEDURE — P9059 PLASMA, FRZ BETWEEN 8-24HOUR: HCPCS

## 2019-01-01 PROCEDURE — 85610 PROTHROMBIN TIME: CPT | Performed by: PHARMACIST

## 2019-01-01 PROCEDURE — 87102 FUNGUS ISOLATION CULTURE: CPT

## 2019-01-01 PROCEDURE — 92523 SPEECH SOUND LANG COMPREHEN: CPT

## 2019-01-01 PROCEDURE — 87529 HSV DNA AMP PROBE: CPT

## 2019-01-01 PROCEDURE — 97116 GAIT TRAINING THERAPY: CPT

## 2019-01-01 PROCEDURE — 99223 1ST HOSP IP/OBS HIGH 75: CPT | Performed by: PSYCHIATRY & NEUROLOGY

## 2019-01-01 PROCEDURE — 86901 BLOOD TYPING SEROLOGIC RH(D): CPT

## 2019-01-01 PROCEDURE — 96368 THER/DIAG CONCURRENT INF: CPT

## 2019-01-01 PROCEDURE — 99211 OFF/OP EST MAY X REQ PHY/QHP: CPT

## 2019-01-01 PROCEDURE — 0DJ08ZZ INSPECTION OF UPPER INTESTINAL TRACT, VIA NATURAL OR ARTIFICIAL OPENING ENDOSCOPIC: ICD-10-PCS | Performed by: INTERNAL MEDICINE

## 2019-01-01 PROCEDURE — 5A1955Z RESPIRATORY VENTILATION, GREATER THAN 96 CONSECUTIVE HOURS: ICD-10-PCS | Performed by: EMERGENCY MEDICINE

## 2019-01-01 PROCEDURE — 83880 ASSAY OF NATRIURETIC PEPTIDE: CPT

## 2019-01-01 PROCEDURE — 95816 EEG AWAKE AND DROWSY: CPT | Performed by: PSYCHIATRY & NEUROLOGY

## 2019-01-01 PROCEDURE — 82553 CREATINE MB FRACTION: CPT

## 2019-01-01 PROCEDURE — 93005 ELECTROCARDIOGRAM TRACING: CPT | Performed by: NURSE PRACTITIONER

## 2019-01-01 PROCEDURE — 94669 MECHANICAL CHEST WALL OSCILL: CPT

## 2019-01-01 PROCEDURE — 82945 GLUCOSE OTHER FLUID: CPT

## 2019-01-01 PROCEDURE — 7100000000 HC PACU RECOVERY - FIRST 15 MIN: Performed by: INTERNAL MEDICINE

## 2019-01-01 PROCEDURE — 99285 EMERGENCY DEPT VISIT HI MDM: CPT

## 2019-01-01 PROCEDURE — 87899 AGENT NOS ASSAY W/OPTIC: CPT

## 2019-01-01 PROCEDURE — 3600000004 HC SURGERY LEVEL 4 BASE: Performed by: COLON & RECTAL SURGERY

## 2019-01-01 PROCEDURE — 96365 THER/PROPH/DIAG IV INF INIT: CPT

## 2019-01-01 PROCEDURE — 70551 MRI BRAIN STEM W/O DYE: CPT

## 2019-01-01 PROCEDURE — P9017 PLASMA 1 DONOR FRZ W/IN 8 HR: HCPCS

## 2019-01-01 PROCEDURE — 85379 FIBRIN DEGRADATION QUANT: CPT

## 2019-01-01 PROCEDURE — 80061 LIPID PANEL: CPT

## 2019-01-01 PROCEDURE — 99284 EMERGENCY DEPT VISIT MOD MDM: CPT

## 2019-01-01 PROCEDURE — C9113 INJ PANTOPRAZOLE SODIUM, VIA: HCPCS | Performed by: EMERGENCY MEDICINE

## 2019-01-01 RX ORDER — SODIUM CHLORIDE 9 MG/ML
250 INJECTION, SOLUTION INTRAVENOUS ONCE
Status: DISCONTINUED | OUTPATIENT
Start: 2019-01-01 | End: 2019-01-01 | Stop reason: HOSPADM

## 2019-01-01 RX ORDER — SODIUM CHLORIDE 0.9 % (FLUSH) 0.9 %
10 SYRINGE (ML) INJECTION EVERY 12 HOURS SCHEDULED
Status: DISCONTINUED | OUTPATIENT
Start: 2019-01-01 | End: 2019-01-01 | Stop reason: HOSPADM

## 2019-01-01 RX ORDER — BUDESONIDE 0.5 MG/2ML
0.5 INHALANT ORAL 2 TIMES DAILY
Status: DISCONTINUED | OUTPATIENT
Start: 2019-01-01 | End: 2019-01-01 | Stop reason: HOSPADM

## 2019-01-01 RX ORDER — ERGOCALCIFEROL 1.25 MG/1
50000 CAPSULE ORAL WEEKLY
COMMUNITY

## 2019-01-01 RX ORDER — SUCCINYLCHOLINE/SOD CL,ISO/PF 100 MG/5ML
SYRINGE (ML) INTRAVENOUS
Status: DISPENSED
Start: 2019-01-01 | End: 2019-01-01

## 2019-01-01 RX ORDER — POTASSIUM CHLORIDE 20MEQ/15ML
20 LIQUID (ML) ORAL ONCE
Status: COMPLETED | OUTPATIENT
Start: 2019-01-01 | End: 2019-01-01

## 2019-01-01 RX ORDER — ATORVASTATIN CALCIUM 40 MG/1
40 TABLET, FILM COATED ORAL NIGHTLY
Status: DISCONTINUED | OUTPATIENT
Start: 2019-01-01 | End: 2019-01-01 | Stop reason: HOSPADM

## 2019-01-01 RX ORDER — ETOMIDATE 2 MG/ML
INJECTION INTRAVENOUS
Status: COMPLETED
Start: 2019-01-01 | End: 2019-01-01

## 2019-01-01 RX ORDER — WARFARIN SODIUM 2.5 MG/1
2.5 TABLET ORAL DAILY
Status: DISCONTINUED | OUTPATIENT
Start: 2019-01-01 | End: 2019-01-01 | Stop reason: DRUGHIGH

## 2019-01-01 RX ORDER — LORAZEPAM 2 MG/ML
1 INJECTION INTRAMUSCULAR EVERY 4 HOURS PRN
Status: DISCONTINUED | OUTPATIENT
Start: 2019-01-01 | End: 2019-01-01 | Stop reason: HOSPADM

## 2019-01-01 RX ORDER — CHLORHEXIDINE GLUCONATE 0.12 MG/ML
15 RINSE ORAL 2 TIMES DAILY
Status: DISCONTINUED | OUTPATIENT
Start: 2019-01-01 | End: 2019-01-01

## 2019-01-01 RX ORDER — ETOMIDATE 2 MG/ML
10 INJECTION INTRAVENOUS ONCE
Status: DISCONTINUED | OUTPATIENT
Start: 2019-01-01 | End: 2019-01-01 | Stop reason: HOSPADM

## 2019-01-01 RX ORDER — PROPOFOL 10 MG/ML
INJECTION, EMULSION INTRAVENOUS
Status: DISPENSED
Start: 2019-01-01 | End: 2019-01-01

## 2019-01-01 RX ORDER — WARFARIN SODIUM 5 MG/1
5 TABLET ORAL
Status: COMPLETED | OUTPATIENT
Start: 2019-01-01 | End: 2019-01-01

## 2019-01-01 RX ORDER — GLYCOPYRROLATE 0.2 MG/ML
0.2 INJECTION INTRAMUSCULAR; INTRAVENOUS EVERY 4 HOURS PRN
Status: CANCELLED | OUTPATIENT
Start: 2019-01-01

## 2019-01-01 RX ORDER — ISOSORBIDE MONONITRATE 60 MG/1
60 TABLET, EXTENDED RELEASE ORAL DAILY
Status: DISCONTINUED | OUTPATIENT
Start: 2019-01-01 | End: 2019-01-01 | Stop reason: HOSPADM

## 2019-01-01 RX ORDER — PROPOFOL 10 MG/ML
10 INJECTION, EMULSION INTRAVENOUS CONTINUOUS
Status: DISCONTINUED | OUTPATIENT
Start: 2019-01-01 | End: 2019-01-01

## 2019-01-01 RX ORDER — POTASSIUM CHLORIDE 20 MEQ/1
40 TABLET, EXTENDED RELEASE ORAL PRN
Status: DISCONTINUED | OUTPATIENT
Start: 2019-01-01 | End: 2019-01-01 | Stop reason: HOSPADM

## 2019-01-01 RX ORDER — SODIUM CHLORIDE 9 MG/ML
10 INJECTION INTRAVENOUS ONCE
Status: COMPLETED | OUTPATIENT
Start: 2019-01-01 | End: 2019-01-01

## 2019-01-01 RX ORDER — SODIUM CHLORIDE 0.9 % (FLUSH) 0.9 %
10 SYRINGE (ML) INJECTION PRN
Status: DISCONTINUED | OUTPATIENT
Start: 2019-01-01 | End: 2019-01-01 | Stop reason: SDUPTHER

## 2019-01-01 RX ORDER — FUROSEMIDE 10 MG/ML
40 INJECTION INTRAMUSCULAR; INTRAVENOUS ONCE
Status: COMPLETED | OUTPATIENT
Start: 2019-01-01 | End: 2019-01-01

## 2019-01-01 RX ORDER — LIDOCAINE HYDROCHLORIDE 20 MG/ML
5 INJECTION, SOLUTION INFILTRATION; PERINEURAL ONCE
Status: COMPLETED | OUTPATIENT
Start: 2019-01-01 | End: 2019-01-01

## 2019-01-01 RX ORDER — PREDNISONE 10 MG/1
40 TABLET ORAL DAILY
Status: DISCONTINUED | OUTPATIENT
Start: 2019-01-01 | End: 2019-01-01 | Stop reason: HOSPADM

## 2019-01-01 RX ORDER — ALBUTEROL SULFATE 90 UG/1
4 AEROSOL, METERED RESPIRATORY (INHALATION) EVERY 4 HOURS PRN
Status: DISCONTINUED | OUTPATIENT
Start: 2019-01-01 | End: 2019-01-01

## 2019-01-01 RX ORDER — 0.9 % SODIUM CHLORIDE 0.9 %
250 INTRAVENOUS SOLUTION INTRAVENOUS ONCE
Status: COMPLETED | OUTPATIENT
Start: 2019-01-01 | End: 2019-01-01

## 2019-01-01 RX ORDER — HYDRALAZINE HYDROCHLORIDE 20 MG/ML
10 INJECTION INTRAMUSCULAR; INTRAVENOUS EVERY 4 HOURS PRN
Status: DISCONTINUED | OUTPATIENT
Start: 2019-01-01 | End: 2019-01-01 | Stop reason: HOSPADM

## 2019-01-01 RX ORDER — PANTOPRAZOLE SODIUM 40 MG/10ML
40 INJECTION, POWDER, LYOPHILIZED, FOR SOLUTION INTRAVENOUS 2 TIMES DAILY
Status: DISCONTINUED | OUTPATIENT
Start: 2019-01-01 | End: 2019-01-01 | Stop reason: HOSPADM

## 2019-01-01 RX ORDER — 0.9 % SODIUM CHLORIDE 0.9 %
1000 INTRAVENOUS SOLUTION INTRAVENOUS ONCE
Status: COMPLETED | OUTPATIENT
Start: 2019-01-01 | End: 2019-01-01

## 2019-01-01 RX ORDER — HEPARIN SODIUM 5000 [USP'U]/ML
5000 INJECTION, SOLUTION INTRAVENOUS; SUBCUTANEOUS EVERY 8 HOURS SCHEDULED
Status: DISCONTINUED | OUTPATIENT
Start: 2019-01-01 | End: 2019-01-01

## 2019-01-01 RX ORDER — ACETAMINOPHEN 325 MG/1
650 TABLET ORAL EVERY 4 HOURS PRN
Status: DISCONTINUED | OUTPATIENT
Start: 2019-01-01 | End: 2019-01-01 | Stop reason: HOSPADM

## 2019-01-01 RX ORDER — GUAIFENESIN 600 MG/1
600 TABLET, EXTENDED RELEASE ORAL 2 TIMES DAILY
Status: DISCONTINUED | OUTPATIENT
Start: 2019-01-01 | End: 2019-01-01 | Stop reason: HOSPADM

## 2019-01-01 RX ORDER — POTASSIUM CHLORIDE 7.45 MG/ML
10 INJECTION INTRAVENOUS PRN
Status: DISCONTINUED | OUTPATIENT
Start: 2019-01-01 | End: 2019-01-01 | Stop reason: HOSPADM

## 2019-01-01 RX ORDER — AMLODIPINE BESYLATE 10 MG/1
10 TABLET ORAL DAILY
Status: DISCONTINUED | OUTPATIENT
Start: 2019-01-01 | End: 2019-01-01 | Stop reason: HOSPADM

## 2019-01-01 RX ORDER — LIDOCAINE HYDROCHLORIDE 20 MG/ML
5 INJECTION, SOLUTION INFILTRATION; PERINEURAL ONCE
Status: DISCONTINUED | OUTPATIENT
Start: 2019-01-01 | End: 2019-01-01 | Stop reason: HOSPADM

## 2019-01-01 RX ORDER — SODIUM CHLORIDE 9 MG/ML
10 INJECTION INTRAVENOUS ONCE
Status: DISCONTINUED | OUTPATIENT
Start: 2019-01-01 | End: 2019-01-01 | Stop reason: HOSPADM

## 2019-01-01 RX ORDER — SODIUM CHLORIDE 9 MG/ML
INJECTION, SOLUTION INTRAVENOUS CONTINUOUS PRN
Status: DISCONTINUED | OUTPATIENT
Start: 2019-01-01 | End: 2019-01-01 | Stop reason: SDUPTHER

## 2019-01-01 RX ORDER — ACETAMINOPHEN 80 MG
TABLET,CHEWABLE ORAL ONCE
Status: COMPLETED | OUTPATIENT
Start: 2019-01-01 | End: 2019-01-01

## 2019-01-01 RX ORDER — METHYLPREDNISOLONE SODIUM SUCCINATE 125 MG/2ML
125 INJECTION, POWDER, LYOPHILIZED, FOR SOLUTION INTRAMUSCULAR; INTRAVENOUS ONCE
Status: COMPLETED | OUTPATIENT
Start: 2019-01-01 | End: 2019-01-01

## 2019-01-01 RX ORDER — ALBUTEROL SULFATE 2.5 MG/3ML
2.5 SOLUTION RESPIRATORY (INHALATION) 3 TIMES DAILY
Status: DISCONTINUED | OUTPATIENT
Start: 2019-01-01 | End: 2019-01-01 | Stop reason: HOSPADM

## 2019-01-01 RX ORDER — PHYTONADIONE 10 MG/ML
10 INJECTION, EMULSION INTRAMUSCULAR; INTRAVENOUS; SUBCUTANEOUS ONCE
Status: COMPLETED | OUTPATIENT
Start: 2019-01-01 | End: 2019-01-01

## 2019-01-01 RX ORDER — ATENOLOL 25 MG/1
12.5 TABLET ORAL 2 TIMES DAILY
Status: DISCONTINUED | OUTPATIENT
Start: 2019-01-01 | End: 2019-01-01

## 2019-01-01 RX ORDER — SODIUM CHLORIDE 0.9 % (FLUSH) 0.9 %
10 SYRINGE (ML) INJECTION EVERY 12 HOURS SCHEDULED
Status: DISCONTINUED | OUTPATIENT
Start: 2019-01-01 | End: 2019-01-01 | Stop reason: SDUPTHER

## 2019-01-01 RX ORDER — ISOSORBIDE MONONITRATE 60 MG/1
60 TABLET, EXTENDED RELEASE ORAL DAILY
Qty: 30 TABLET | Refills: 3 | Status: SHIPPED | OUTPATIENT
Start: 2019-01-01

## 2019-01-01 RX ORDER — ALBUTEROL SULFATE 2.5 MG/3ML
2.5 SOLUTION RESPIRATORY (INHALATION) 3 TIMES DAILY
Status: DISCONTINUED | OUTPATIENT
Start: 2019-01-01 | End: 2019-01-01

## 2019-01-01 RX ORDER — ASPIRIN 81 MG/1
81 TABLET, CHEWABLE ORAL DAILY
Qty: 30 TABLET | Refills: 3 | Status: SHIPPED | OUTPATIENT
Start: 2019-01-01

## 2019-01-01 RX ORDER — ROSUVASTATIN CALCIUM 5 MG/1
10 TABLET, COATED ORAL DAILY
Status: DISCONTINUED | OUTPATIENT
Start: 2019-01-01 | End: 2019-01-01 | Stop reason: SDUPTHER

## 2019-01-01 RX ORDER — WARFARIN SODIUM 5 MG/1
5 TABLET ORAL
Status: DISCONTINUED | OUTPATIENT
Start: 2019-01-01 | End: 2019-01-01

## 2019-01-01 RX ORDER — SODIUM CHLORIDE 0.9 % (FLUSH) 0.9 %
10 SYRINGE (ML) INJECTION PRN
Status: DISCONTINUED | OUTPATIENT
Start: 2019-01-01 | End: 2019-01-01 | Stop reason: HOSPADM

## 2019-01-01 RX ORDER — CEFUROXIME AXETIL 500 MG/1
250 TABLET ORAL EVERY 12 HOURS SCHEDULED
Status: DISCONTINUED | OUTPATIENT
Start: 2019-01-01 | End: 2019-01-01

## 2019-01-01 RX ORDER — LORAZEPAM 2 MG/ML
INJECTION INTRAMUSCULAR
Status: DISCONTINUED
Start: 2019-01-01 | End: 2019-01-01

## 2019-01-01 RX ORDER — MORPHINE SULFATE 4 MG/ML
4 INJECTION, SOLUTION INTRAMUSCULAR; INTRAVENOUS
Status: DISCONTINUED | OUTPATIENT
Start: 2019-01-01 | End: 2019-01-01 | Stop reason: HOSPADM

## 2019-01-01 RX ORDER — WARFARIN SODIUM 5 MG/1
5 TABLET ORAL
Status: DISCONTINUED | OUTPATIENT
Start: 2019-01-01 | End: 2019-01-01 | Stop reason: HOSPADM

## 2019-01-01 RX ORDER — FENTANYL CITRATE 50 UG/ML
25 INJECTION, SOLUTION INTRAMUSCULAR; INTRAVENOUS
Status: DISCONTINUED | OUTPATIENT
Start: 2019-01-01 | End: 2019-01-01

## 2019-01-01 RX ORDER — BUPIVACAINE HYDROCHLORIDE AND EPINEPHRINE 5; 5 MG/ML; UG/ML
INJECTION, SOLUTION EPIDURAL; INTRACAUDAL; PERINEURAL PRN
Status: DISCONTINUED | OUTPATIENT
Start: 2019-01-01 | End: 2019-01-01 | Stop reason: ALTCHOICE

## 2019-01-01 RX ORDER — 0.9 % SODIUM CHLORIDE 0.9 %
250 INTRAVENOUS SOLUTION INTRAVENOUS ONCE
Status: DISCONTINUED | OUTPATIENT
Start: 2019-01-01 | End: 2019-01-01 | Stop reason: HOSPADM

## 2019-01-01 RX ORDER — 0.9 % SODIUM CHLORIDE 0.9 %
1000 INTRAVENOUS SOLUTION INTRAVENOUS ONCE
Status: DISCONTINUED | OUTPATIENT
Start: 2019-01-01 | End: 2019-01-01 | Stop reason: HOSPADM

## 2019-01-01 RX ORDER — ATENOLOL 50 MG/1
50 TABLET ORAL 2 TIMES DAILY
Status: DISCONTINUED | OUTPATIENT
Start: 2019-01-01 | End: 2019-01-01 | Stop reason: HOSPADM

## 2019-01-01 RX ORDER — MAGNESIUM HYDROXIDE 1200 MG/15ML
LIQUID ORAL PRN
Status: DISCONTINUED | OUTPATIENT
Start: 2019-01-01 | End: 2019-01-01 | Stop reason: ALTCHOICE

## 2019-01-01 RX ORDER — PHYTONADIONE 5 MG/1
5 TABLET ORAL ONCE
Status: COMPLETED | OUTPATIENT
Start: 2019-01-01 | End: 2019-01-01

## 2019-01-01 RX ORDER — HEPARIN SODIUM 5000 [USP'U]/ML
5000 INJECTION, SOLUTION INTRAVENOUS; SUBCUTANEOUS 2 TIMES DAILY
Status: DISCONTINUED | OUTPATIENT
Start: 2019-01-01 | End: 2019-01-01 | Stop reason: ALTCHOICE

## 2019-01-01 RX ORDER — NICOTINE 21 MG/24HR
1 PATCH, TRANSDERMAL 24 HOURS TRANSDERMAL DAILY
Status: DISCONTINUED | OUTPATIENT
Start: 2019-01-01 | End: 2019-01-01 | Stop reason: HOSPADM

## 2019-01-01 RX ORDER — KETOROLAC TROMETHAMINE 30 MG/ML
30 INJECTION, SOLUTION INTRAMUSCULAR; INTRAVENOUS ONCE
Status: COMPLETED | OUTPATIENT
Start: 2019-01-01 | End: 2019-01-01

## 2019-01-01 RX ORDER — LORAZEPAM 2 MG/ML
2 INJECTION INTRAMUSCULAR ONCE
Status: COMPLETED | OUTPATIENT
Start: 2019-01-01 | End: 2019-01-01

## 2019-01-01 RX ORDER — DEXTROSE MONOHYDRATE 50 MG/ML
INJECTION, SOLUTION INTRAVENOUS CONTINUOUS
Status: DISCONTINUED | OUTPATIENT
Start: 2019-01-01 | End: 2019-01-01

## 2019-01-01 RX ORDER — SODIUM CHLORIDE 9 MG/ML
10 INJECTION INTRAVENOUS DAILY
Status: DISCONTINUED | OUTPATIENT
Start: 2019-01-01 | End: 2019-01-01

## 2019-01-01 RX ORDER — HYDRALAZINE HYDROCHLORIDE 20 MG/ML
5 INJECTION INTRAMUSCULAR; INTRAVENOUS
Status: DISCONTINUED | OUTPATIENT
Start: 2019-01-01 | End: 2019-01-01 | Stop reason: HOSPADM

## 2019-01-01 RX ORDER — POTASSIUM CHLORIDE 1.5 G/1.77G
40 POWDER, FOR SOLUTION ORAL PRN
Status: DISCONTINUED | OUTPATIENT
Start: 2019-01-01 | End: 2019-01-01 | Stop reason: HOSPADM

## 2019-01-01 RX ORDER — ACETYLCYSTEINE 200 MG/ML
600 SOLUTION ORAL; RESPIRATORY (INHALATION) 2 TIMES DAILY
Status: DISCONTINUED | OUTPATIENT
Start: 2019-01-01 | End: 2019-01-01

## 2019-01-01 RX ORDER — ATENOLOL 25 MG/1
25 TABLET ORAL 2 TIMES DAILY
Status: DISCONTINUED | OUTPATIENT
Start: 2019-01-01 | End: 2019-01-01

## 2019-01-01 RX ORDER — PANTOPRAZOLE SODIUM 40 MG/10ML
40 INJECTION, POWDER, LYOPHILIZED, FOR SOLUTION INTRAVENOUS
Status: DISCONTINUED | OUTPATIENT
Start: 2019-01-01 | End: 2019-01-01

## 2019-01-01 RX ORDER — FUROSEMIDE 40 MG/1
40 TABLET ORAL SEE ADMIN INSTRUCTIONS
Qty: 60 TABLET | Refills: 3 | Status: SHIPPED | OUTPATIENT
Start: 2019-01-01

## 2019-01-01 RX ORDER — ALBUTEROL SULFATE 2.5 MG/3ML
SOLUTION RESPIRATORY (INHALATION)
Status: DISPENSED
Start: 2019-01-01 | End: 2019-01-01

## 2019-01-01 RX ORDER — PROPOFOL 10 MG/ML
10 INJECTION, EMULSION INTRAVENOUS
Status: DISCONTINUED | OUTPATIENT
Start: 2019-01-01 | End: 2019-01-01

## 2019-01-01 RX ORDER — CEFUROXIME AXETIL 250 MG/1
250 TABLET ORAL EVERY 12 HOURS SCHEDULED
Qty: 10 TABLET | Refills: 0 | Status: SHIPPED | OUTPATIENT
Start: 2019-01-01 | End: 2019-01-01 | Stop reason: HOSPADM

## 2019-01-01 RX ORDER — ALBUTEROL SULFATE 2.5 MG/3ML
2.5 SOLUTION RESPIRATORY (INHALATION) EVERY 4 HOURS PRN
Status: DISCONTINUED | OUTPATIENT
Start: 2019-01-01 | End: 2019-01-01 | Stop reason: HOSPADM

## 2019-01-01 RX ORDER — PANTOPRAZOLE SODIUM 40 MG/10ML
40 INJECTION, POWDER, LYOPHILIZED, FOR SOLUTION INTRAVENOUS DAILY
Status: DISCONTINUED | OUTPATIENT
Start: 2019-01-01 | End: 2019-01-01

## 2019-01-01 RX ORDER — SODIUM CHLORIDE 9 MG/ML
10 INJECTION INTRAVENOUS 2 TIMES DAILY
Status: DISCONTINUED | OUTPATIENT
Start: 2019-01-01 | End: 2019-01-01 | Stop reason: HOSPADM

## 2019-01-01 RX ORDER — CLOPIDOGREL BISULFATE 75 MG/1
75 TABLET ORAL DAILY
Status: DISCONTINUED | OUTPATIENT
Start: 2019-01-01 | End: 2019-01-01 | Stop reason: HOSPADM

## 2019-01-01 RX ORDER — FUROSEMIDE 40 MG/1
40 TABLET ORAL
Status: DISCONTINUED | OUTPATIENT
Start: 2019-01-01 | End: 2019-01-01 | Stop reason: HOSPADM

## 2019-01-01 RX ORDER — ALBUTEROL SULFATE 90 UG/1
2 AEROSOL, METERED RESPIRATORY (INHALATION) 3 TIMES DAILY
Status: DISCONTINUED | OUTPATIENT
Start: 2019-01-01 | End: 2019-01-01

## 2019-01-01 RX ORDER — ONDANSETRON 2 MG/ML
4 INJECTION INTRAMUSCULAR; INTRAVENOUS EVERY 6 HOURS PRN
Status: DISCONTINUED | OUTPATIENT
Start: 2019-01-01 | End: 2019-01-01 | Stop reason: HOSPADM

## 2019-01-01 RX ORDER — ATENOLOL 50 MG/1
50 TABLET ORAL 2 TIMES DAILY
Qty: 30 TABLET | Refills: 3 | Status: SHIPPED | OUTPATIENT
Start: 2019-01-01

## 2019-01-01 RX ORDER — ROSUVASTATIN CALCIUM 20 MG/1
20 TABLET, COATED ORAL NIGHTLY
Status: DISCONTINUED | OUTPATIENT
Start: 2019-01-01 | End: 2019-01-01 | Stop reason: HOSPADM

## 2019-01-01 RX ORDER — SODIUM CHLORIDE 9 MG/ML
INJECTION, SOLUTION INTRAVENOUS CONTINUOUS
Status: DISCONTINUED | OUTPATIENT
Start: 2019-01-01 | End: 2019-01-01

## 2019-01-01 RX ORDER — LEVETIRACETAM 500 MG/1
500 TABLET ORAL 2 TIMES DAILY
Qty: 60 TABLET | Refills: 3
Start: 2019-01-01

## 2019-01-01 RX ORDER — GUAIFENESIN 600 MG/1
600 TABLET, EXTENDED RELEASE ORAL 2 TIMES DAILY
Qty: 30 TABLET | Refills: 0 | Status: SHIPPED | OUTPATIENT
Start: 2019-01-01

## 2019-01-01 RX ORDER — ATENOLOL 25 MG/1
25 TABLET ORAL 3 TIMES DAILY
Status: DISCONTINUED | OUTPATIENT
Start: 2019-01-01 | End: 2019-01-01

## 2019-01-01 RX ORDER — LEVETIRACETAM 100 MG/ML
500 SOLUTION ORAL 2 TIMES DAILY
Status: DISCONTINUED | OUTPATIENT
Start: 2019-01-01 | End: 2019-01-01

## 2019-01-01 RX ORDER — IPRATROPIUM BROMIDE AND ALBUTEROL SULFATE 2.5; .5 MG/3ML; MG/3ML
1 SOLUTION RESPIRATORY (INHALATION) PRN
Status: DISCONTINUED | OUTPATIENT
Start: 2019-01-01 | End: 2019-01-01

## 2019-01-01 RX ORDER — WARFARIN SODIUM 5 MG/1
TABLET ORAL
Qty: 120 TABLET | Refills: 1 | Status: SHIPPED | OUTPATIENT
Start: 2019-01-01

## 2019-01-01 RX ORDER — PROPOFOL 10 MG/ML
INJECTION, EMULSION INTRAVENOUS
Status: COMPLETED
Start: 2019-01-01 | End: 2019-01-01

## 2019-01-01 RX ORDER — ASPIRIN 81 MG/1
81 TABLET, CHEWABLE ORAL DAILY
Status: DISCONTINUED | OUTPATIENT
Start: 2019-01-01 | End: 2019-01-01 | Stop reason: HOSPADM

## 2019-01-01 RX ORDER — ALBUTEROL SULFATE 2.5 MG/3ML
2.5 SOLUTION RESPIRATORY (INHALATION)
Status: DISCONTINUED | OUTPATIENT
Start: 2019-01-01 | End: 2019-01-01 | Stop reason: HOSPADM

## 2019-01-01 RX ORDER — ACETAMINOPHEN 325 MG/1
650 TABLET ORAL EVERY 4 HOURS PRN
COMMUNITY

## 2019-01-01 RX ORDER — SODIUM CHLORIDE 9 MG/ML
250 INJECTION, SOLUTION INTRAVENOUS ONCE
Status: DISCONTINUED | OUTPATIENT
Start: 2019-01-01 | End: 2019-01-01

## 2019-01-01 RX ORDER — IPRATROPIUM BROMIDE AND ALBUTEROL SULFATE 2.5; .5 MG/3ML; MG/3ML
1 SOLUTION RESPIRATORY (INHALATION) EVERY 4 HOURS PRN
Status: DISCONTINUED | OUTPATIENT
Start: 2019-01-01 | End: 2019-01-01

## 2019-01-01 RX ORDER — ETOMIDATE 2 MG/ML
20 INJECTION INTRAVENOUS ONCE
Status: COMPLETED | OUTPATIENT
Start: 2019-01-01 | End: 2019-01-01

## 2019-01-01 RX ORDER — LEVOFLOXACIN 5 MG/ML
500 INJECTION, SOLUTION INTRAVENOUS ONCE
Status: DISCONTINUED | OUTPATIENT
Start: 2019-01-01 | End: 2019-01-01

## 2019-01-01 RX ORDER — MAGNESIUM SULFATE 1 G/100ML
1 INJECTION INTRAVENOUS PRN
Status: DISCONTINUED | OUTPATIENT
Start: 2019-01-01 | End: 2019-01-01 | Stop reason: HOSPADM

## 2019-01-01 RX ORDER — WARFARIN SODIUM 10 MG/1
10 TABLET ORAL
Status: COMPLETED | OUTPATIENT
Start: 2019-01-01 | End: 2019-01-01

## 2019-01-01 RX ORDER — LIDOCAINE HYDROCHLORIDE 20 MG/ML
INJECTION, SOLUTION INFILTRATION; PERINEURAL
Status: COMPLETED
Start: 2019-01-01 | End: 2019-01-01

## 2019-01-01 RX ORDER — PANTOPRAZOLE SODIUM 40 MG/10ML
40 INJECTION, POWDER, LYOPHILIZED, FOR SOLUTION INTRAVENOUS ONCE
Status: COMPLETED | OUTPATIENT
Start: 2019-01-01 | End: 2019-01-01

## 2019-01-01 RX ORDER — ATORVASTATIN CALCIUM 40 MG/1
40 TABLET, FILM COATED ORAL NIGHTLY
COMMUNITY

## 2019-01-01 RX ORDER — LEVETIRACETAM 500 MG/1
500 TABLET ORAL 2 TIMES DAILY
Status: DISCONTINUED | OUTPATIENT
Start: 2019-01-01 | End: 2019-01-01 | Stop reason: HOSPADM

## 2019-01-01 RX ORDER — 0.9 % SODIUM CHLORIDE 0.9 %
500 INTRAVENOUS SOLUTION INTRAVENOUS ONCE
Status: COMPLETED | OUTPATIENT
Start: 2019-01-01 | End: 2019-01-01

## 2019-01-01 RX ORDER — SODIUM CHLORIDE, SODIUM LACTATE, POTASSIUM CHLORIDE, CALCIUM CHLORIDE 600; 310; 30; 20 MG/100ML; MG/100ML; MG/100ML; MG/100ML
INJECTION, SOLUTION INTRAVENOUS CONTINUOUS
Status: DISPENSED | OUTPATIENT
Start: 2019-01-01 | End: 2019-01-01

## 2019-01-01 RX ORDER — GUAIFENESIN 600 MG/1
600 TABLET, EXTENDED RELEASE ORAL 2 TIMES DAILY
Status: DISCONTINUED | OUTPATIENT
Start: 2019-01-01 | End: 2019-01-01

## 2019-01-01 RX ORDER — ROSUVASTATIN CALCIUM 20 MG/1
20 TABLET, COATED ORAL NIGHTLY
Qty: 30 TABLET | Refills: 3 | Status: SHIPPED | OUTPATIENT
Start: 2019-01-01 | End: 2019-01-01 | Stop reason: ALTCHOICE

## 2019-01-01 RX ORDER — PROPOFOL 10 MG/ML
10 INJECTION, EMULSION INTRAVENOUS ONCE
Status: COMPLETED | OUTPATIENT
Start: 2019-01-01 | End: 2019-01-01

## 2019-01-01 RX ORDER — PROPOFOL 10 MG/ML
INJECTION, EMULSION INTRAVENOUS PRN
Status: DISCONTINUED | OUTPATIENT
Start: 2019-01-01 | End: 2019-01-01 | Stop reason: SDUPTHER

## 2019-01-01 RX ORDER — ONDANSETRON 2 MG/ML
4 INJECTION INTRAMUSCULAR; INTRAVENOUS
Status: DISCONTINUED | OUTPATIENT
Start: 2019-01-01 | End: 2019-01-01 | Stop reason: HOSPADM

## 2019-01-01 RX ORDER — GUAIFENESIN 100 MG/5ML
200 SOLUTION ORAL EVERY 4 HOURS
Status: DISCONTINUED | OUTPATIENT
Start: 2019-01-01 | End: 2019-01-01 | Stop reason: HOSPADM

## 2019-01-01 RX ORDER — FUROSEMIDE 40 MG/1
40 TABLET ORAL DAILY
Status: DISCONTINUED | OUTPATIENT
Start: 2019-01-01 | End: 2019-01-01

## 2019-01-01 RX ADMIN — ROSUVASTATIN CALCIUM 20 MG: 20 TABLET, FILM COATED ORAL at 20:42

## 2019-01-01 RX ADMIN — Medication 10 ML: at 21:09

## 2019-01-01 RX ADMIN — PIPERACILLIN AND TAZOBACTAM 2.25 G: 2; .25 INJECTION, POWDER, LYOPHILIZED, FOR SOLUTION INTRAVENOUS at 04:15

## 2019-01-01 RX ADMIN — PROPOFOL 40 MCG/KG/MIN: 10 INJECTION, EMULSION INTRAVENOUS at 20:37

## 2019-01-01 RX ADMIN — POTASSIUM CHLORIDE 20 MEQ: 20 SOLUTION ORAL at 16:34

## 2019-01-01 RX ADMIN — AMLODIPINE BESYLATE 10 MG: 10 TABLET ORAL at 08:38

## 2019-01-01 RX ADMIN — PIPERACILLIN AND TAZOBACTAM 3.38 G: 3; .375 INJECTION, POWDER, LYOPHILIZED, FOR SOLUTION INTRAVENOUS at 09:54

## 2019-01-01 RX ADMIN — ROSUVASTATIN CALCIUM 20 MG: 20 TABLET, FILM COATED ORAL at 21:47

## 2019-01-01 RX ADMIN — GUAIFENESIN 200 MG: 200 SOLUTION ORAL at 13:20

## 2019-01-01 RX ADMIN — Medication 10 ML: at 08:26

## 2019-01-01 RX ADMIN — LEVETIRACETAM 500 MG: 500 INJECTION, SOLUTION INTRAVENOUS at 12:17

## 2019-01-01 RX ADMIN — ATENOLOL 50 MG: 50 TABLET ORAL at 21:08

## 2019-01-01 RX ADMIN — HEPARIN SODIUM 5000 UNITS: 5000 INJECTION, SOLUTION INTRAVENOUS; SUBCUTANEOUS at 06:19

## 2019-01-01 RX ADMIN — PANTOPRAZOLE SODIUM 40 MG: 40 INJECTION, POWDER, FOR SOLUTION INTRAVENOUS at 09:53

## 2019-01-01 RX ADMIN — Medication 10 ML: at 20:31

## 2019-01-01 RX ADMIN — LORAZEPAM 2 MG: 2 INJECTION INTRAMUSCULAR; INTRAVENOUS at 21:41

## 2019-01-01 RX ADMIN — AMLODIPINE BESYLATE 10 MG: 10 TABLET ORAL at 09:18

## 2019-01-01 RX ADMIN — ATENOLOL 50 MG: 50 TABLET ORAL at 20:20

## 2019-01-01 RX ADMIN — FENTANYL CITRATE 25 MCG: 50 INJECTION, SOLUTION INTRAMUSCULAR; INTRAVENOUS at 10:20

## 2019-01-01 RX ADMIN — ISOSORBIDE MONONITRATE 60 MG: 60 TABLET, EXTENDED RELEASE ORAL at 08:36

## 2019-01-01 RX ADMIN — FUROSEMIDE 40 MG: 10 INJECTION, SOLUTION INTRAMUSCULAR; INTRAVENOUS at 15:55

## 2019-01-01 RX ADMIN — FUROSEMIDE 40 MG: 40 TABLET ORAL at 09:51

## 2019-01-01 RX ADMIN — SODIUM CHLORIDE: 9 INJECTION, SOLUTION INTRAVENOUS at 05:10

## 2019-01-01 RX ADMIN — ISOSORBIDE MONONITRATE 60 MG: 60 TABLET, EXTENDED RELEASE ORAL at 10:47

## 2019-01-01 RX ADMIN — SODIUM CHLORIDE: 9 INJECTION, SOLUTION INTRAVENOUS at 17:24

## 2019-01-01 RX ADMIN — Medication 10 ML: at 09:33

## 2019-01-01 RX ADMIN — SODIUM CHLORIDE 250 ML: 9 INJECTION, SOLUTION INTRAVENOUS at 09:17

## 2019-01-01 RX ADMIN — LEVETIRACETAM 500 MG: 500 INJECTION, SOLUTION INTRAVENOUS at 22:49

## 2019-01-01 RX ADMIN — Medication 10 ML: at 08:37

## 2019-01-01 RX ADMIN — ALBUTEROL SULFATE 2.5 MG: 2.5 SOLUTION RESPIRATORY (INHALATION) at 13:36

## 2019-01-01 RX ADMIN — ALBUTEROL SULFATE 2.5 MG: 2.5 SOLUTION RESPIRATORY (INHALATION) at 14:39

## 2019-01-01 RX ADMIN — VANCOMYCIN HYDROCHLORIDE 500 MG: 500 INJECTION, POWDER, LYOPHILIZED, FOR SOLUTION INTRAVENOUS at 22:02

## 2019-01-01 RX ADMIN — GUAIFENESIN 600 MG: 600 TABLET, EXTENDED RELEASE ORAL at 09:58

## 2019-01-01 RX ADMIN — AMLODIPINE BESYLATE 10 MG: 10 TABLET ORAL at 08:41

## 2019-01-01 RX ADMIN — ATORVASTATIN CALCIUM 40 MG: 40 TABLET, FILM COATED ORAL at 23:05

## 2019-01-01 RX ADMIN — WARFARIN SODIUM 5 MG: 5 TABLET ORAL at 18:28

## 2019-01-01 RX ADMIN — PIPERACILLIN AND TAZOBACTAM 2.25 G: 2; .25 INJECTION, POWDER, LYOPHILIZED, FOR SOLUTION INTRAVENOUS at 20:02

## 2019-01-01 RX ADMIN — PANTOPRAZOLE SODIUM 40 MG: 40 INJECTION, POWDER, FOR SOLUTION INTRAVENOUS at 20:05

## 2019-01-01 RX ADMIN — FUROSEMIDE 40 MG: 40 TABLET ORAL at 10:47

## 2019-01-01 RX ADMIN — PIPERACILLIN AND TAZOBACTAM 3.38 G: 3; .375 INJECTION, POWDER, LYOPHILIZED, FOR SOLUTION INTRAVENOUS at 22:09

## 2019-01-01 RX ADMIN — PIPERACILLIN AND TAZOBACTAM 2.25 G: 2; .25 INJECTION, POWDER, LYOPHILIZED, FOR SOLUTION INTRAVENOUS at 05:10

## 2019-01-01 RX ADMIN — MUPIROCIN: 20 OINTMENT TOPICAL at 20:54

## 2019-01-01 RX ADMIN — PIPERACILLIN AND TAZOBACTAM 2.25 G: 2; .25 INJECTION, POWDER, LYOPHILIZED, FOR SOLUTION INTRAVENOUS at 02:47

## 2019-01-01 RX ADMIN — ISOSORBIDE MONONITRATE 60 MG: 60 TABLET, EXTENDED RELEASE ORAL at 11:18

## 2019-01-01 RX ADMIN — LEVETIRACETAM 500 MG: 500 INJECTION, SOLUTION INTRAVENOUS at 22:54

## 2019-01-01 RX ADMIN — ACETYLCYSTEINE 600 MG: 200 SOLUTION ORAL; RESPIRATORY (INHALATION) at 19:01

## 2019-01-01 RX ADMIN — Medication 10 ML: at 09:47

## 2019-01-01 RX ADMIN — GUAIFENESIN 600 MG: 600 TABLET, EXTENDED RELEASE ORAL at 08:08

## 2019-01-01 RX ADMIN — GUAIFENESIN 600 MG: 600 TABLET, EXTENDED RELEASE ORAL at 09:10

## 2019-01-01 RX ADMIN — ATENOLOL 25 MG: 25 TABLET ORAL at 19:59

## 2019-01-01 RX ADMIN — ALBUTEROL SULFATE 2.5 MG: 2.5 SOLUTION RESPIRATORY (INHALATION) at 05:15

## 2019-01-01 RX ADMIN — PIPERACILLIN AND TAZOBACTAM 2.25 G: 2; .25 INJECTION, POWDER, LYOPHILIZED, FOR SOLUTION INTRAVENOUS at 21:01

## 2019-01-01 RX ADMIN — BUDESONIDE 500 MCG: 0.5 SUSPENSION RESPIRATORY (INHALATION) at 07:25

## 2019-01-01 RX ADMIN — LEVETIRACETAM 500 MG: 500 TABLET, FILM COATED ORAL at 20:31

## 2019-01-01 RX ADMIN — PANTOPRAZOLE SODIUM 40 MG: 40 INJECTION, POWDER, FOR SOLUTION INTRAVENOUS at 20:14

## 2019-01-01 RX ADMIN — LEVETIRACETAM 500 MG: 100 SOLUTION ORAL at 09:45

## 2019-01-01 RX ADMIN — LEVETIRACETAM 500 MG: 500 TABLET, FILM COATED ORAL at 08:38

## 2019-01-01 RX ADMIN — NITROGLYCERIN 0.5 INCH: 20 OINTMENT TOPICAL at 11:52

## 2019-01-01 RX ADMIN — ALBUTEROL SULFATE 2 PUFF: 90 AEROSOL, METERED RESPIRATORY (INHALATION) at 20:33

## 2019-01-01 RX ADMIN — Medication 10 ML: at 20:42

## 2019-01-01 RX ADMIN — ASPIRIN 81 MG 81 MG: 81 TABLET ORAL at 11:16

## 2019-01-01 RX ADMIN — PIPERACILLIN AND TAZOBACTAM 2.25 G: 2; .25 INJECTION, POWDER, LYOPHILIZED, FOR SOLUTION INTRAVENOUS at 20:30

## 2019-01-01 RX ADMIN — PIPERACILLIN AND TAZOBACTAM 3.38 G: 3; .375 INJECTION, POWDER, LYOPHILIZED, FOR SOLUTION INTRAVENOUS at 00:22

## 2019-01-01 RX ADMIN — ATENOLOL 50 MG: 50 TABLET ORAL at 20:42

## 2019-01-01 RX ADMIN — Medication 10 ML: at 13:17

## 2019-01-01 RX ADMIN — MICONAZOLE NITRATE: 20 POWDER TOPICAL at 20:14

## 2019-01-01 RX ADMIN — LEVETIRACETAM 500 MG: 500 INJECTION, SOLUTION INTRAVENOUS at 13:16

## 2019-01-01 RX ADMIN — MUPIROCIN: 20 OINTMENT TOPICAL at 20:31

## 2019-01-01 RX ADMIN — ATENOLOL 50 MG: 50 TABLET ORAL at 22:52

## 2019-01-01 RX ADMIN — PIPERACILLIN AND TAZOBACTAM 2.25 G: 2; .25 INJECTION, POWDER, LYOPHILIZED, FOR SOLUTION INTRAVENOUS at 04:44

## 2019-01-01 RX ADMIN — GUAIFENESIN 600 MG: 600 TABLET, EXTENDED RELEASE ORAL at 20:42

## 2019-01-01 RX ADMIN — ALBUTEROL SULFATE 2.5 MG: 2.5 SOLUTION RESPIRATORY (INHALATION) at 20:44

## 2019-01-01 RX ADMIN — Medication 10 ML: at 09:16

## 2019-01-01 RX ADMIN — PIPERACILLIN AND TAZOBACTAM 2.25 G: 2; .25 INJECTION, POWDER, LYOPHILIZED, FOR SOLUTION INTRAVENOUS at 13:10

## 2019-01-01 RX ADMIN — GUAIFENESIN 200 MG: 200 SOLUTION ORAL at 22:18

## 2019-01-01 RX ADMIN — Medication 10 ML: at 08:15

## 2019-01-01 RX ADMIN — ATENOLOL 50 MG: 50 TABLET ORAL at 08:14

## 2019-01-01 RX ADMIN — Medication 10 ML: at 21:50

## 2019-01-01 RX ADMIN — PANTOPRAZOLE SODIUM 40 MG: 40 INJECTION, POWDER, FOR SOLUTION INTRAVENOUS at 09:32

## 2019-01-01 RX ADMIN — DARBEPOETIN ALFA 25 MCG: 25 SOLUTION INTRAVENOUS; SUBCUTANEOUS at 20:52

## 2019-01-01 RX ADMIN — Medication 10 ML: at 11:18

## 2019-01-01 RX ADMIN — LEVETIRACETAM 500 MG: 500 INJECTION, SOLUTION INTRAVENOUS at 13:34

## 2019-01-01 RX ADMIN — ATENOLOL 50 MG: 50 TABLET ORAL at 08:38

## 2019-01-01 RX ADMIN — ALBUTEROL SULFATE 2.5 MG: 2.5 SOLUTION RESPIRATORY (INHALATION) at 11:49

## 2019-01-01 RX ADMIN — NITROGLYCERIN 1 INCH: 20 OINTMENT TOPICAL at 13:21

## 2019-01-01 RX ADMIN — LEVETIRACETAM 500 MG: 500 INJECTION, SOLUTION INTRAVENOUS at 00:57

## 2019-01-01 RX ADMIN — PANTOPRAZOLE SODIUM 40 MG: 40 INJECTION, POWDER, FOR SOLUTION INTRAVENOUS at 09:45

## 2019-01-01 RX ADMIN — ROSUVASTATIN CALCIUM 20 MG: 20 TABLET, FILM COATED ORAL at 00:11

## 2019-01-01 RX ADMIN — ROSUVASTATIN CALCIUM 20 MG: 20 TABLET, FILM COATED ORAL at 20:31

## 2019-01-01 RX ADMIN — BUDESONIDE 500 MCG: 0.5 SUSPENSION RESPIRATORY (INHALATION) at 20:48

## 2019-01-01 RX ADMIN — Medication 10 ML: at 20:07

## 2019-01-01 RX ADMIN — ATENOLOL 25 MG: 25 TABLET ORAL at 09:23

## 2019-01-01 RX ADMIN — BUDESONIDE 500 MCG: 0.5 SUSPENSION RESPIRATORY (INHALATION) at 21:05

## 2019-01-01 RX ADMIN — CHLORHEXIDINE GLUCONATE 0.12% ORAL RINSE 15 ML: 1.2 LIQUID ORAL at 21:04

## 2019-01-01 RX ADMIN — ASPIRIN 81 MG 81 MG: 81 TABLET ORAL at 08:42

## 2019-01-01 RX ADMIN — GUAIFENESIN 600 MG: 600 TABLET, EXTENDED RELEASE ORAL at 21:47

## 2019-01-01 RX ADMIN — ISOSORBIDE MONONITRATE 60 MG: 60 TABLET, EXTENDED RELEASE ORAL at 09:14

## 2019-01-01 RX ADMIN — ACETYLCYSTEINE 600 MG: 200 SOLUTION ORAL; RESPIRATORY (INHALATION) at 07:35

## 2019-01-01 RX ADMIN — PROPOFOL 30 MCG/KG/MIN: 10 INJECTION, EMULSION INTRAVENOUS at 23:04

## 2019-01-01 RX ADMIN — CLOPIDOGREL BISULFATE 75 MG: 75 TABLET ORAL at 08:38

## 2019-01-01 RX ADMIN — GUAIFENESIN 600 MG: 600 TABLET, EXTENDED RELEASE ORAL at 14:42

## 2019-01-01 RX ADMIN — ROSUVASTATIN CALCIUM 20 MG: 20 TABLET, FILM COATED ORAL at 19:59

## 2019-01-01 RX ADMIN — SODIUM CHLORIDE: 9 INJECTION, SOLUTION INTRAVENOUS at 05:40

## 2019-01-01 RX ADMIN — ACYCLOVIR SODIUM 190 MG: 50 INJECTION, SOLUTION INTRAVENOUS at 12:18

## 2019-01-01 RX ADMIN — PIPERACILLIN AND TAZOBACTAM 3.38 G: 3; .375 INJECTION, POWDER, LYOPHILIZED, FOR SOLUTION INTRAVENOUS at 13:10

## 2019-01-01 RX ADMIN — NITROGLYCERIN 1 INCH: 20 OINTMENT TOPICAL at 21:14

## 2019-01-01 RX ADMIN — Medication 10 ML: at 09:17

## 2019-01-01 RX ADMIN — Medication 10 ML: at 21:47

## 2019-01-01 RX ADMIN — NITROGLYCERIN 1 INCH: 20 OINTMENT TOPICAL at 17:45

## 2019-01-01 RX ADMIN — CLOPIDOGREL BISULFATE 75 MG: 75 TABLET ORAL at 09:10

## 2019-01-01 RX ADMIN — GUAIFENESIN 200 MG: 200 SOLUTION ORAL at 17:35

## 2019-01-01 RX ADMIN — ASPIRIN 81 MG 81 MG: 81 TABLET ORAL at 10:47

## 2019-01-01 RX ADMIN — PROPOFOL 10 MCG/KG/MIN: 10 INJECTION, EMULSION INTRAVENOUS at 02:14

## 2019-01-01 RX ADMIN — HYDRALAZINE HYDROCHLORIDE 10 MG: 20 INJECTION INTRAMUSCULAR; INTRAVENOUS at 03:09

## 2019-01-01 RX ADMIN — DEXTROSE MONOHYDRATE: 50 INJECTION, SOLUTION INTRAVENOUS at 18:06

## 2019-01-01 RX ADMIN — AMLODIPINE BESYLATE 10 MG: 10 TABLET ORAL at 10:47

## 2019-01-01 RX ADMIN — GUAIFENESIN 600 MG: 600 TABLET, EXTENDED RELEASE ORAL at 09:19

## 2019-01-01 RX ADMIN — AMLODIPINE BESYLATE 10 MG: 10 TABLET ORAL at 08:13

## 2019-01-01 RX ADMIN — ATORVASTATIN CALCIUM 40 MG: 40 TABLET, FILM COATED ORAL at 21:41

## 2019-01-01 RX ADMIN — AMLODIPINE BESYLATE 10 MG: 10 TABLET ORAL at 09:12

## 2019-01-01 RX ADMIN — PIPERACILLIN AND TAZOBACTAM 2.25 G: 2; .25 INJECTION, POWDER, LYOPHILIZED, FOR SOLUTION INTRAVENOUS at 21:08

## 2019-01-01 RX ADMIN — PIPERACILLIN AND TAZOBACTAM 2.25 G: 2; .25 INJECTION, POWDER, LYOPHILIZED, FOR SOLUTION INTRAVENOUS at 12:53

## 2019-01-01 RX ADMIN — PIPERACILLIN AND TAZOBACTAM 2.25 G: 2; .25 INJECTION, POWDER, LYOPHILIZED, FOR SOLUTION INTRAVENOUS at 04:30

## 2019-01-01 RX ADMIN — ALBUTEROL SULFATE 2.5 MG: 2.5 SOLUTION RESPIRATORY (INHALATION) at 19:28

## 2019-01-01 RX ADMIN — Medication 10 ML: at 09:11

## 2019-01-01 RX ADMIN — LEVETIRACETAM 500 MG: 100 SOLUTION ORAL at 21:49

## 2019-01-01 RX ADMIN — SODIUM CHLORIDE: 9 INJECTION, SOLUTION INTRAVENOUS at 07:34

## 2019-01-01 RX ADMIN — LEVETIRACETAM 500 MG: 500 INJECTION, SOLUTION INTRAVENOUS at 22:20

## 2019-01-01 RX ADMIN — ACETYLCYSTEINE 600 MG: 200 SOLUTION ORAL; RESPIRATORY (INHALATION) at 20:08

## 2019-01-01 RX ADMIN — DOXYCYCLINE 100 MG: 100 INJECTION, POWDER, LYOPHILIZED, FOR SOLUTION INTRAVENOUS at 21:55

## 2019-01-01 RX ADMIN — PROPOFOL 10 MG: 10 INJECTION, EMULSION INTRAVENOUS at 13:15

## 2019-01-01 RX ADMIN — BUDESONIDE 500 MCG: 0.5 SUSPENSION RESPIRATORY (INHALATION) at 19:06

## 2019-01-01 RX ADMIN — PIPERACILLIN AND TAZOBACTAM 3.38 G: 3; .375 INJECTION, POWDER, LYOPHILIZED, FOR SOLUTION INTRAVENOUS at 16:00

## 2019-01-01 RX ADMIN — VANCOMYCIN HYDROCHLORIDE 1000 MG: 1 INJECTION, POWDER, LYOPHILIZED, FOR SOLUTION INTRAVENOUS at 22:43

## 2019-01-01 RX ADMIN — CEFUROXIME AXETIL 250 MG: 500 TABLET ORAL at 16:54

## 2019-01-01 RX ADMIN — PIPERACILLIN AND TAZOBACTAM 3.38 G: 3; .375 INJECTION, POWDER, LYOPHILIZED, FOR SOLUTION INTRAVENOUS at 01:03

## 2019-01-01 RX ADMIN — NITROGLYCERIN 1 INCH: 20 OINTMENT TOPICAL at 08:32

## 2019-01-01 RX ADMIN — LEVETIRACETAM 500 MG: 500 INJECTION, SOLUTION INTRAVENOUS at 23:05

## 2019-01-01 RX ADMIN — GUAIFENESIN 600 MG: 600 TABLET, EXTENDED RELEASE ORAL at 08:38

## 2019-01-01 RX ADMIN — ROSUVASTATIN CALCIUM 20 MG: 20 TABLET, FILM COATED ORAL at 21:53

## 2019-01-01 RX ADMIN — NITROGLYCERIN 1 INCH: 20 OINTMENT TOPICAL at 00:11

## 2019-01-01 RX ADMIN — Medication 10 ML: at 09:24

## 2019-01-01 RX ADMIN — ISOSORBIDE MONONITRATE 60 MG: 60 TABLET, EXTENDED RELEASE ORAL at 09:45

## 2019-01-01 RX ADMIN — GUAIFENESIN 600 MG: 600 TABLET, EXTENDED RELEASE ORAL at 10:46

## 2019-01-01 RX ADMIN — PROPOFOL 40 MCG/KG/MIN: 10 INJECTION, EMULSION INTRAVENOUS at 19:46

## 2019-01-01 RX ADMIN — PIPERACILLIN AND TAZOBACTAM 3.38 G: 3; .375 INJECTION, POWDER, LYOPHILIZED, FOR SOLUTION INTRAVENOUS at 12:32

## 2019-01-01 RX ADMIN — LEVETIRACETAM 500 MG: 500 INJECTION, SOLUTION INTRAVENOUS at 10:53

## 2019-01-01 RX ADMIN — PIPERACILLIN AND TAZOBACTAM 2.25 G: 2; .25 INJECTION, POWDER, LYOPHILIZED, FOR SOLUTION INTRAVENOUS at 14:13

## 2019-01-01 RX ADMIN — CLOPIDOGREL BISULFATE 75 MG: 75 TABLET ORAL at 09:53

## 2019-01-01 RX ADMIN — PROPOFOL 20 MCG/KG/MIN: 10 INJECTION, EMULSION INTRAVENOUS at 12:05

## 2019-01-01 RX ADMIN — Medication 10 ML: at 22:55

## 2019-01-01 RX ADMIN — CHLORHEXIDINE GLUCONATE 0.12% ORAL RINSE 15 ML: 1.2 LIQUID ORAL at 09:17

## 2019-01-01 RX ADMIN — LEVETIRACETAM 500 MG: 500 INJECTION, SOLUTION INTRAVENOUS at 23:36

## 2019-01-01 RX ADMIN — ALBUTEROL SULFATE 2.5 MG: 2.5 SOLUTION RESPIRATORY (INHALATION) at 20:41

## 2019-01-01 RX ADMIN — Medication 10 ML: at 13:37

## 2019-01-01 RX ADMIN — AMLODIPINE BESYLATE 10 MG: 10 TABLET ORAL at 12:52

## 2019-01-01 RX ADMIN — Medication 10 ML: at 20:15

## 2019-01-01 RX ADMIN — PIPERACILLIN AND TAZOBACTAM 2.25 G: 2; .25 INJECTION, POWDER, LYOPHILIZED, FOR SOLUTION INTRAVENOUS at 20:21

## 2019-01-01 RX ADMIN — Medication 10 ML: at 09:54

## 2019-01-01 RX ADMIN — NITROGLYCERIN 1 INCH: 20 OINTMENT TOPICAL at 06:08

## 2019-01-01 RX ADMIN — KETOROLAC TROMETHAMINE 30 MG: 30 INJECTION, SOLUTION INTRAMUSCULAR at 21:47

## 2019-01-01 RX ADMIN — LEVETIRACETAM 500 MG: 500 INJECTION, SOLUTION INTRAVENOUS at 23:59

## 2019-01-01 RX ADMIN — LEVETIRACETAM 500 MG: 100 INJECTION, SOLUTION INTRAVENOUS at 21:51

## 2019-01-01 RX ADMIN — ALBUTEROL SULFATE 2.5 MG: 2.5 SOLUTION RESPIRATORY (INHALATION) at 08:06

## 2019-01-01 RX ADMIN — NITROGLYCERIN 1 INCH: 20 OINTMENT TOPICAL at 03:43

## 2019-01-01 RX ADMIN — FUROSEMIDE 40 MG: 40 TABLET ORAL at 11:16

## 2019-01-01 RX ADMIN — CHLORHEXIDINE GLUCONATE 0.12% ORAL RINSE 15 ML: 1.2 LIQUID ORAL at 20:14

## 2019-01-01 RX ADMIN — ATENOLOL 50 MG: 50 TABLET ORAL at 09:45

## 2019-01-01 RX ADMIN — LEVETIRACETAM 500 MG: 500 TABLET, FILM COATED ORAL at 09:50

## 2019-01-01 RX ADMIN — ALBUTEROL SULFATE 2 PUFF: 90 AEROSOL, METERED RESPIRATORY (INHALATION) at 14:58

## 2019-01-01 RX ADMIN — Medication 10 ML: at 21:01

## 2019-01-01 RX ADMIN — PIPERACILLIN AND TAZOBACTAM 2.25 G: 2; .25 INJECTION, POWDER, LYOPHILIZED, FOR SOLUTION INTRAVENOUS at 13:36

## 2019-01-01 RX ADMIN — ATORVASTATIN CALCIUM 40 MG: 40 TABLET, FILM COATED ORAL at 21:05

## 2019-01-01 RX ADMIN — LEVETIRACETAM 500 MG: 500 INJECTION, SOLUTION INTRAVENOUS at 23:17

## 2019-01-01 RX ADMIN — LEVETIRACETAM 500 MG: 500 TABLET, FILM COATED ORAL at 21:47

## 2019-01-01 RX ADMIN — Medication 10 ML: at 09:53

## 2019-01-01 RX ADMIN — PROPOFOL 10 MCG/KG/MIN: 10 INJECTION, EMULSION INTRAVENOUS at 22:16

## 2019-01-01 RX ADMIN — GUAIFENESIN 200 MG: 200 SOLUTION ORAL at 17:47

## 2019-01-01 RX ADMIN — ALBUTEROL SULFATE 2.5 MG: 2.5 SOLUTION RESPIRATORY (INHALATION) at 04:44

## 2019-01-01 RX ADMIN — PIPERACILLIN AND TAZOBACTAM 3.38 G: 3; .375 INJECTION, POWDER, LYOPHILIZED, FOR SOLUTION INTRAVENOUS at 10:54

## 2019-01-01 RX ADMIN — CHLORHEXIDINE GLUCONATE 0.12% ORAL RINSE 15 ML: 1.2 LIQUID ORAL at 23:05

## 2019-01-01 RX ADMIN — LEVETIRACETAM 500 MG: 100 SOLUTION ORAL at 09:23

## 2019-01-01 RX ADMIN — Medication 10 ML: at 20:55

## 2019-01-01 RX ADMIN — FUROSEMIDE 40 MG: 40 TABLET ORAL at 09:53

## 2019-01-01 RX ADMIN — PIPERACILLIN AND TAZOBACTAM 2.25 G: 2; .25 INJECTION, POWDER, LYOPHILIZED, FOR SOLUTION INTRAVENOUS at 05:01

## 2019-01-01 RX ADMIN — AMLODIPINE BESYLATE 10 MG: 10 TABLET ORAL at 08:23

## 2019-01-01 RX ADMIN — BUDESONIDE 500 MCG: 0.5 SUSPENSION RESPIRATORY (INHALATION) at 20:26

## 2019-01-01 RX ADMIN — BUDESONIDE 500 MCG: 0.5 SUSPENSION RESPIRATORY (INHALATION) at 19:27

## 2019-01-01 RX ADMIN — PIPERACILLIN AND TAZOBACTAM 2.25 G: 2; .25 INJECTION, POWDER, LYOPHILIZED, FOR SOLUTION INTRAVENOUS at 04:56

## 2019-01-01 RX ADMIN — CHLORHEXIDINE GLUCONATE 0.12% ORAL RINSE 15 ML: 1.2 LIQUID ORAL at 20:49

## 2019-01-01 RX ADMIN — PHYTONADIONE 10 MG: 10 INJECTION, EMULSION INTRAMUSCULAR; INTRAVENOUS; SUBCUTANEOUS at 11:37

## 2019-01-01 RX ADMIN — SODIUM CHLORIDE 8 MG/HR: 9 INJECTION, SOLUTION INTRAVENOUS at 23:43

## 2019-01-01 RX ADMIN — GUAIFENESIN 600 MG: 600 TABLET, EXTENDED RELEASE ORAL at 08:14

## 2019-01-01 RX ADMIN — ATENOLOL 50 MG: 50 TABLET ORAL at 20:53

## 2019-01-01 RX ADMIN — WARFARIN SODIUM 5 MG: 5 TABLET ORAL at 17:10

## 2019-01-01 RX ADMIN — LEVETIRACETAM 500 MG: 100 INJECTION, SOLUTION, CONCENTRATE INTRAVENOUS at 10:48

## 2019-01-01 RX ADMIN — LEVETIRACETAM 500 MG: 500 TABLET, FILM COATED ORAL at 10:47

## 2019-01-01 RX ADMIN — ALBUTEROL SULFATE 2.5 MG: 2.5 SOLUTION RESPIRATORY (INHALATION) at 21:00

## 2019-01-01 RX ADMIN — PROPOFOL 35 MCG/KG/MIN: 10 INJECTION, EMULSION INTRAVENOUS at 10:32

## 2019-01-01 RX ADMIN — ATORVASTATIN CALCIUM 40 MG: 40 TABLET, FILM COATED ORAL at 20:20

## 2019-01-01 RX ADMIN — GUAIFENESIN 600 MG: 600 TABLET, EXTENDED RELEASE ORAL at 09:47

## 2019-01-01 RX ADMIN — Medication 10 ML: at 09:32

## 2019-01-01 RX ADMIN — CLOPIDOGREL BISULFATE 75 MG: 75 TABLET ORAL at 10:00

## 2019-01-01 RX ADMIN — PANTOPRAZOLE SODIUM 40 MG: 40 INJECTION, POWDER, FOR SOLUTION INTRAVENOUS at 14:58

## 2019-01-01 RX ADMIN — HYDRALAZINE HYDROCHLORIDE 10 MG: 20 INJECTION INTRAMUSCULAR; INTRAVENOUS at 00:42

## 2019-01-01 RX ADMIN — SODIUM CHLORIDE: 9 INJECTION, SOLUTION INTRAVENOUS at 23:20

## 2019-01-01 RX ADMIN — ATENOLOL 50 MG: 50 TABLET ORAL at 20:03

## 2019-01-01 RX ADMIN — ALBUTEROL SULFATE 2.5 MG: 2.5 SOLUTION RESPIRATORY (INHALATION) at 20:00

## 2019-01-01 RX ADMIN — LEVETIRACETAM 500 MG: 500 INJECTION, SOLUTION INTRAVENOUS at 11:06

## 2019-01-01 RX ADMIN — CHLORHEXIDINE GLUCONATE 0.12% ORAL RINSE 15 ML: 1.2 LIQUID ORAL at 09:53

## 2019-01-01 RX ADMIN — NITROGLYCERIN 1 INCH: 20 OINTMENT TOPICAL at 05:22

## 2019-01-01 RX ADMIN — PIPERACILLIN AND TAZOBACTAM 3.38 G: 3; .375 INJECTION, POWDER, LYOPHILIZED, FOR SOLUTION INTRAVENOUS at 09:45

## 2019-01-01 RX ADMIN — Medication 10 ML: at 20:03

## 2019-01-01 RX ADMIN — NITROGLYCERIN 0.5 INCH: 20 OINTMENT TOPICAL at 18:34

## 2019-01-01 RX ADMIN — ROSUVASTATIN CALCIUM 20 MG: 20 TABLET, FILM COATED ORAL at 21:55

## 2019-01-01 RX ADMIN — BUDESONIDE 500 MCG: 0.5 SUSPENSION RESPIRATORY (INHALATION) at 07:50

## 2019-01-01 RX ADMIN — LEVETIRACETAM 500 MG: 100 INJECTION, SOLUTION INTRAVENOUS at 10:54

## 2019-01-01 RX ADMIN — FUROSEMIDE 40 MG: 10 INJECTION, SOLUTION INTRAMUSCULAR; INTRAVENOUS at 13:27

## 2019-01-01 RX ADMIN — PIPERACILLIN AND TAZOBACTAM 3.38 G: 3; .375 INJECTION, POWDER, LYOPHILIZED, FOR SOLUTION INTRAVENOUS at 22:36

## 2019-01-01 RX ADMIN — ATENOLOL 50 MG: 50 TABLET ORAL at 09:19

## 2019-01-01 RX ADMIN — CHLORHEXIDINE GLUCONATE 0.12% ORAL RINSE 15 ML: 1.2 LIQUID ORAL at 20:46

## 2019-01-01 RX ADMIN — ASPIRIN 81 MG 81 MG: 81 TABLET ORAL at 09:23

## 2019-01-01 RX ADMIN — CLOPIDOGREL BISULFATE 75 MG: 75 TABLET ORAL at 09:23

## 2019-01-01 RX ADMIN — CHLORHEXIDINE GLUCONATE 0.12% ORAL RINSE 15 ML: 1.2 LIQUID ORAL at 19:59

## 2019-01-01 RX ADMIN — NITROGLYCERIN 0.5 INCH: 20 OINTMENT TOPICAL at 12:26

## 2019-01-01 RX ADMIN — Medication 10 ML: at 08:36

## 2019-01-01 RX ADMIN — PREDNISONE 40 MG: 10 TABLET ORAL at 08:36

## 2019-01-01 RX ADMIN — GUAIFENESIN 600 MG: 600 TABLET, EXTENDED RELEASE ORAL at 08:41

## 2019-01-01 RX ADMIN — BUDESONIDE 500 MCG: 0.5 SUSPENSION RESPIRATORY (INHALATION) at 20:08

## 2019-01-01 RX ADMIN — LEVETIRACETAM 500 MG: 100 SOLUTION ORAL at 20:46

## 2019-01-01 RX ADMIN — GUAIFENESIN 200 MG: 200 SOLUTION ORAL at 05:20

## 2019-01-01 RX ADMIN — PANTOPRAZOLE SODIUM 40 MG: 40 INJECTION, POWDER, FOR SOLUTION INTRAVENOUS at 21:04

## 2019-01-01 RX ADMIN — Medication 10 ML: at 21:17

## 2019-01-01 RX ADMIN — Medication 10 ML: at 09:46

## 2019-01-01 RX ADMIN — MICONAZOLE NITRATE: 20 POWDER TOPICAL at 20:49

## 2019-01-01 RX ADMIN — PREDNISONE 40 MG: 10 TABLET ORAL at 08:23

## 2019-01-01 RX ADMIN — PROPOFOL 30 MCG/KG/MIN: 10 INJECTION, EMULSION INTRAVENOUS at 09:50

## 2019-01-01 RX ADMIN — PANTOPRAZOLE SODIUM 40 MG: 40 INJECTION, POWDER, FOR SOLUTION INTRAVENOUS at 20:30

## 2019-01-01 RX ADMIN — GUAIFENESIN 600 MG: 600 TABLET, EXTENDED RELEASE ORAL at 09:53

## 2019-01-01 RX ADMIN — MUPIROCIN: 20 OINTMENT TOPICAL at 10:48

## 2019-01-01 RX ADMIN — ALBUTEROL SULFATE 2 PUFF: 90 AEROSOL, METERED RESPIRATORY (INHALATION) at 04:27

## 2019-01-01 RX ADMIN — ALBUTEROL SULFATE 2.5 MG: 2.5 SOLUTION RESPIRATORY (INHALATION) at 04:28

## 2019-01-01 RX ADMIN — DOXYCYCLINE 100 MG: 100 INJECTION, POWDER, LYOPHILIZED, FOR SOLUTION INTRAVENOUS at 20:30

## 2019-01-01 RX ADMIN — LIDOCAINE HYDROCHLORIDE 5 ML: 20 INJECTION, SOLUTION INFILTRATION; PERINEURAL at 13:05

## 2019-01-01 RX ADMIN — PANTOPRAZOLE SODIUM 40 MG: 40 INJECTION, POWDER, FOR SOLUTION INTRAVENOUS at 20:20

## 2019-01-01 RX ADMIN — FUROSEMIDE 40 MG: 40 TABLET ORAL at 10:00

## 2019-01-01 RX ADMIN — PIPERACILLIN AND TAZOBACTAM 2.25 G: 2; .25 INJECTION, POWDER, LYOPHILIZED, FOR SOLUTION INTRAVENOUS at 05:16

## 2019-01-01 RX ADMIN — ATORVASTATIN CALCIUM 40 MG: 40 TABLET, FILM COATED ORAL at 20:12

## 2019-01-01 RX ADMIN — ALBUTEROL SULFATE 2 PUFF: 90 AEROSOL, METERED RESPIRATORY (INHALATION) at 14:13

## 2019-01-01 RX ADMIN — Medication 10 ML: at 20:59

## 2019-01-01 RX ADMIN — GUAIFENESIN 200 MG: 200 SOLUTION ORAL at 11:00

## 2019-01-01 RX ADMIN — ISOSORBIDE MONONITRATE 60 MG: 60 TABLET, EXTENDED RELEASE ORAL at 09:19

## 2019-01-01 RX ADMIN — PANTOPRAZOLE SODIUM 40 MG: 40 INJECTION, POWDER, FOR SOLUTION INTRAVENOUS at 20:42

## 2019-01-01 RX ADMIN — AMLODIPINE BESYLATE 10 MG: 10 TABLET ORAL at 09:51

## 2019-01-01 RX ADMIN — MICONAZOLE NITRATE: 20 POWDER TOPICAL at 13:51

## 2019-01-01 RX ADMIN — MUPIROCIN: 20 OINTMENT TOPICAL at 10:01

## 2019-01-01 RX ADMIN — PANTOPRAZOLE SODIUM 40 MG: 40 INJECTION, POWDER, FOR SOLUTION INTRAVENOUS at 08:14

## 2019-01-01 RX ADMIN — PIPERACILLIN AND TAZOBACTAM 2.25 G: 2; .25 INJECTION, POWDER, LYOPHILIZED, FOR SOLUTION INTRAVENOUS at 05:51

## 2019-01-01 RX ADMIN — NITROGLYCERIN 1 INCH: 20 OINTMENT TOPICAL at 13:18

## 2019-01-01 RX ADMIN — SODIUM CHLORIDE 1000 ML: 9 INJECTION, SOLUTION INTRAVENOUS at 22:15

## 2019-01-01 RX ADMIN — Medication 10 ML: at 21:00

## 2019-01-01 RX ADMIN — SODIUM CHLORIDE 8 MG/HR: 9 INJECTION, SOLUTION INTRAVENOUS at 01:54

## 2019-01-01 RX ADMIN — MAGNESIUM SULFATE HEPTAHYDRATE 1 G: 1 INJECTION, SOLUTION INTRAVENOUS at 01:47

## 2019-01-01 RX ADMIN — LEVETIRACETAM 500 MG: 500 INJECTION, SOLUTION INTRAVENOUS at 10:38

## 2019-01-01 RX ADMIN — PIPERACILLIN AND TAZOBACTAM 3.38 G: 3; .375 INJECTION, POWDER, LYOPHILIZED, FOR SOLUTION INTRAVENOUS at 10:19

## 2019-01-01 RX ADMIN — WARFARIN SODIUM 10 MG: 10 TABLET ORAL at 18:34

## 2019-01-01 RX ADMIN — PIPERACILLIN AND TAZOBACTAM 3.38 G: 3; .375 INJECTION, POWDER, LYOPHILIZED, FOR SOLUTION INTRAVENOUS at 23:04

## 2019-01-01 RX ADMIN — PIPERACILLIN AND TAZOBACTAM 2.25 G: 2; .25 INJECTION, POWDER, LYOPHILIZED, FOR SOLUTION INTRAVENOUS at 21:03

## 2019-01-01 RX ADMIN — PIPERACILLIN AND TAZOBACTAM 2.25 G: 2; .25 INJECTION, POWDER, LYOPHILIZED, FOR SOLUTION INTRAVENOUS at 12:47

## 2019-01-01 RX ADMIN — PIPERACILLIN AND TAZOBACTAM 3.38 G: 3; .375 INJECTION, POWDER, LYOPHILIZED, FOR SOLUTION INTRAVENOUS at 00:46

## 2019-01-01 RX ADMIN — MICONAZOLE NITRATE: 20 POWDER TOPICAL at 11:04

## 2019-01-01 RX ADMIN — PIPERACILLIN AND TAZOBACTAM 3.38 G: 3; .375 INJECTION, POWDER, LYOPHILIZED, FOR SOLUTION INTRAVENOUS at 11:45

## 2019-01-01 RX ADMIN — ATORVASTATIN CALCIUM 40 MG: 40 TABLET, FILM COATED ORAL at 21:08

## 2019-01-01 RX ADMIN — PROPOFOL 35 MCG/KG/MIN: 10 INJECTION, EMULSION INTRAVENOUS at 19:38

## 2019-01-01 RX ADMIN — PANTOPRAZOLE SODIUM 40 MG: 40 INJECTION, POWDER, FOR SOLUTION INTRAVENOUS at 09:20

## 2019-01-01 RX ADMIN — LEVETIRACETAM 500 MG: 100 SOLUTION ORAL at 09:16

## 2019-01-01 RX ADMIN — PIPERACILLIN AND TAZOBACTAM 2.25 G: 2; .25 INJECTION, POWDER, LYOPHILIZED, FOR SOLUTION INTRAVENOUS at 12:30

## 2019-01-01 RX ADMIN — ISOSORBIDE MONONITRATE 60 MG: 60 TABLET, EXTENDED RELEASE ORAL at 09:51

## 2019-01-01 RX ADMIN — PANTOPRAZOLE SODIUM 40 MG: 40 INJECTION, POWDER, FOR SOLUTION INTRAVENOUS at 09:23

## 2019-01-01 RX ADMIN — PANTOPRAZOLE SODIUM 40 MG: 40 INJECTION, POWDER, FOR SOLUTION INTRAVENOUS at 08:08

## 2019-01-01 RX ADMIN — PANTOPRAZOLE SODIUM 40 MG: 40 INJECTION, POWDER, FOR SOLUTION INTRAVENOUS at 09:09

## 2019-01-01 RX ADMIN — PIPERACILLIN AND TAZOBACTAM 3.38 G: 3; .375 INJECTION, POWDER, LYOPHILIZED, FOR SOLUTION INTRAVENOUS at 01:22

## 2019-01-01 RX ADMIN — ATENOLOL 25 MG: 25 TABLET ORAL at 20:46

## 2019-01-01 RX ADMIN — SODIUM CHLORIDE 500 ML: 9 INJECTION, SOLUTION INTRAVENOUS at 14:58

## 2019-01-01 RX ADMIN — AMLODIPINE BESYLATE 10 MG: 10 TABLET ORAL at 09:10

## 2019-01-01 RX ADMIN — PIPERACILLIN AND TAZOBACTAM 2.25 G: 2; .25 INJECTION, POWDER, LYOPHILIZED, FOR SOLUTION INTRAVENOUS at 13:52

## 2019-01-01 RX ADMIN — PREDNISONE 40 MG: 10 TABLET ORAL at 08:08

## 2019-01-01 RX ADMIN — Medication 10 ML: at 09:19

## 2019-01-01 RX ADMIN — LEVETIRACETAM 500 MG: 500 TABLET, FILM COATED ORAL at 21:55

## 2019-01-01 RX ADMIN — PIPERACILLIN AND TAZOBACTAM 2.25 G: 2; .25 INJECTION, POWDER, LYOPHILIZED, FOR SOLUTION INTRAVENOUS at 13:07

## 2019-01-01 RX ADMIN — ATORVASTATIN CALCIUM 40 MG: 40 TABLET, FILM COATED ORAL at 20:30

## 2019-01-01 RX ADMIN — MICONAZOLE NITRATE: 20 POWDER TOPICAL at 09:29

## 2019-01-01 RX ADMIN — AMLODIPINE BESYLATE 10 MG: 10 TABLET ORAL at 10:00

## 2019-01-01 RX ADMIN — BUDESONIDE 500 MCG: 0.5 SUSPENSION RESPIRATORY (INHALATION) at 05:23

## 2019-01-01 RX ADMIN — BUDESONIDE 500 MCG: 0.5 SUSPENSION RESPIRATORY (INHALATION) at 07:39

## 2019-01-01 RX ADMIN — BUDESONIDE 500 MCG: 0.5 SUSPENSION RESPIRATORY (INHALATION) at 21:15

## 2019-01-01 RX ADMIN — PROPOFOL 34.8 MCG/KG/MIN: 10 INJECTION, EMULSION INTRAVENOUS at 05:31

## 2019-01-01 RX ADMIN — PANTOPRAZOLE SODIUM 40 MG: 40 INJECTION, POWDER, FOR SOLUTION INTRAVENOUS at 22:54

## 2019-01-01 RX ADMIN — ROSUVASTATIN CALCIUM 20 MG: 20 TABLET, FILM COATED ORAL at 20:51

## 2019-01-01 RX ADMIN — Medication 10 ML: at 09:48

## 2019-01-01 RX ADMIN — MICONAZOLE NITRATE: 20 POWDER TOPICAL at 21:01

## 2019-01-01 RX ADMIN — MICONAZOLE NITRATE: 20 POWDER TOPICAL at 08:39

## 2019-01-01 RX ADMIN — PHYTONADIONE 5 MG: 5 TABLET ORAL at 16:02

## 2019-01-01 RX ADMIN — AMLODIPINE BESYLATE 10 MG: 10 TABLET ORAL at 09:53

## 2019-01-01 RX ADMIN — ALBUTEROL SULFATE 2.5 MG: 2.5 SOLUTION RESPIRATORY (INHALATION) at 13:22

## 2019-01-01 RX ADMIN — PROPOFOL 20 MCG/KG/MIN: 10 INJECTION, EMULSION INTRAVENOUS at 05:23

## 2019-01-01 RX ADMIN — WARFARIN SODIUM 5 MG: 5 TABLET ORAL at 18:08

## 2019-01-01 RX ADMIN — SODIUM CHLORIDE: 9 INJECTION, SOLUTION INTRAVENOUS at 20:21

## 2019-01-01 RX ADMIN — LEVETIRACETAM 500 MG: 100 SOLUTION ORAL at 19:59

## 2019-01-01 RX ADMIN — CHLORHEXIDINE GLUCONATE 0.12% ORAL RINSE 15 ML: 1.2 LIQUID ORAL at 02:15

## 2019-01-01 RX ADMIN — NITROGLYCERIN 1 INCH: 20 OINTMENT TOPICAL at 00:55

## 2019-01-01 RX ADMIN — ATENOLOL 50 MG: 50 TABLET ORAL at 09:10

## 2019-01-01 RX ADMIN — LEVETIRACETAM 500 MG: 500 TABLET, FILM COATED ORAL at 20:42

## 2019-01-01 RX ADMIN — SODIUM CHLORIDE, POTASSIUM CHLORIDE, SODIUM LACTATE AND CALCIUM CHLORIDE: 600; 310; 30; 20 INJECTION, SOLUTION INTRAVENOUS at 02:15

## 2019-01-01 RX ADMIN — PIPERACILLIN AND TAZOBACTAM 2.25 G: 2; .25 INJECTION, POWDER, LYOPHILIZED, FOR SOLUTION INTRAVENOUS at 21:16

## 2019-01-01 RX ADMIN — ALBUTEROL SULFATE 2 PUFF: 90 AEROSOL, METERED RESPIRATORY (INHALATION) at 21:15

## 2019-01-01 RX ADMIN — HEPARIN SODIUM 5000 UNITS: 5000 INJECTION, SOLUTION INTRAVENOUS; SUBCUTANEOUS at 21:48

## 2019-01-01 RX ADMIN — LEVETIRACETAM 500 MG: 500 INJECTION, SOLUTION INTRAVENOUS at 11:28

## 2019-01-01 RX ADMIN — PREDNISONE 40 MG: 10 TABLET ORAL at 15:55

## 2019-01-01 RX ADMIN — PIPERACILLIN AND TAZOBACTAM 2.25 G: 2; .25 INJECTION, POWDER, LYOPHILIZED, FOR SOLUTION INTRAVENOUS at 20:25

## 2019-01-01 RX ADMIN — ASPIRIN 81 MG 81 MG: 81 TABLET ORAL at 09:53

## 2019-01-01 RX ADMIN — PREDNISONE 40 MG: 10 TABLET ORAL at 08:13

## 2019-01-01 RX ADMIN — CHLORHEXIDINE GLUCONATE 0.12% ORAL RINSE 15 ML: 1.2 LIQUID ORAL at 08:08

## 2019-01-01 RX ADMIN — ASPIRIN 81 MG 81 MG: 81 TABLET ORAL at 08:38

## 2019-01-01 RX ADMIN — LEVETIRACETAM 500 MG: 500 INJECTION, SOLUTION INTRAVENOUS at 13:20

## 2019-01-01 RX ADMIN — BUDESONIDE 500 MCG: 0.5 SUSPENSION RESPIRATORY (INHALATION) at 07:53

## 2019-01-01 RX ADMIN — GUAIFENESIN 200 MG: 200 SOLUTION ORAL at 09:47

## 2019-01-01 RX ADMIN — ALBUTEROL SULFATE 2.5 MG: 2.5 SOLUTION RESPIRATORY (INHALATION) at 21:03

## 2019-01-01 RX ADMIN — ATENOLOL 50 MG: 50 TABLET ORAL at 09:50

## 2019-01-01 RX ADMIN — Medication 10 ML: at 09:18

## 2019-01-01 RX ADMIN — CLOPIDOGREL BISULFATE 75 MG: 75 TABLET ORAL at 09:47

## 2019-01-01 RX ADMIN — Medication 10 ML: at 20:52

## 2019-01-01 RX ADMIN — ATENOLOL 25 MG: 25 TABLET ORAL at 13:18

## 2019-01-01 RX ADMIN — Medication 10 ML: at 08:25

## 2019-01-01 RX ADMIN — ETOMIDATE 10 MG: 2 INJECTION, SOLUTION INTRAVENOUS at 12:05

## 2019-01-01 RX ADMIN — ALBUTEROL SULFATE 2.5 MG: 2.5 SOLUTION RESPIRATORY (INHALATION) at 04:12

## 2019-01-01 RX ADMIN — BUDESONIDE 500 MCG: 0.5 SUSPENSION RESPIRATORY (INHALATION) at 04:30

## 2019-01-01 RX ADMIN — ISOSORBIDE MONONITRATE 60 MG: 60 TABLET, EXTENDED RELEASE ORAL at 10:00

## 2019-01-01 RX ADMIN — GUAIFENESIN 200 MG: 200 SOLUTION ORAL at 17:51

## 2019-01-01 RX ADMIN — CHLORHEXIDINE GLUCONATE 0.12% ORAL RINSE 15 ML: 1.2 LIQUID ORAL at 21:47

## 2019-01-01 RX ADMIN — AMLODIPINE BESYLATE 10 MG: 10 TABLET ORAL at 09:19

## 2019-01-01 RX ADMIN — GUAIFENESIN 600 MG: 600 TABLET, EXTENDED RELEASE ORAL at 08:35

## 2019-01-01 RX ADMIN — ATENOLOL 50 MG: 50 TABLET ORAL at 10:23

## 2019-01-01 RX ADMIN — LEVETIRACETAM 500 MG: 500 INJECTION, SOLUTION INTRAVENOUS at 22:50

## 2019-01-01 RX ADMIN — MICONAZOLE NITRATE: 20 POWDER TOPICAL at 20:11

## 2019-01-01 RX ADMIN — FUROSEMIDE 40 MG: 10 INJECTION, SOLUTION INTRAMUSCULAR; INTRAVENOUS at 14:34

## 2019-01-01 RX ADMIN — CHLORHEXIDINE GLUCONATE 0.12% ORAL RINSE 15 ML: 1.2 LIQUID ORAL at 09:10

## 2019-01-01 RX ADMIN — MICONAZOLE NITRATE: 20 POWDER TOPICAL at 21:07

## 2019-01-01 RX ADMIN — ALBUTEROL SULFATE 2.5 MG: 2.5 SOLUTION RESPIRATORY (INHALATION) at 12:06

## 2019-01-01 RX ADMIN — GUAIFENESIN 200 MG: 200 SOLUTION ORAL at 22:52

## 2019-01-01 RX ADMIN — PIPERACILLIN AND TAZOBACTAM 2.25 G: 2; .25 INJECTION, POWDER, LYOPHILIZED, FOR SOLUTION INTRAVENOUS at 05:20

## 2019-01-01 RX ADMIN — MUPIROCIN: 20 OINTMENT TOPICAL at 09:48

## 2019-01-01 RX ADMIN — ALBUTEROL SULFATE 5 MG: 2.5 SOLUTION RESPIRATORY (INHALATION) at 15:49

## 2019-01-01 RX ADMIN — PANTOPRAZOLE SODIUM 40 MG: 40 INJECTION, POWDER, FOR SOLUTION INTRAVENOUS at 09:16

## 2019-01-01 RX ADMIN — SODIUM CHLORIDE 8 MG/HR: 9 INJECTION, SOLUTION INTRAVENOUS at 11:01

## 2019-01-01 RX ADMIN — NITROGLYCERIN 0.5 INCH: 20 OINTMENT TOPICAL at 12:20

## 2019-01-01 RX ADMIN — BUDESONIDE 500 MCG: 0.5 SUSPENSION RESPIRATORY (INHALATION) at 08:13

## 2019-01-01 RX ADMIN — Medication: at 09:28

## 2019-01-01 RX ADMIN — PREDNISONE 40 MG: 10 TABLET ORAL at 09:20

## 2019-01-01 RX ADMIN — Medication 10 ML: at 20:05

## 2019-01-01 RX ADMIN — LEVETIRACETAM 500 MG: 500 INJECTION, SOLUTION INTRAVENOUS at 09:44

## 2019-01-01 RX ADMIN — ATENOLOL 25 MG: 25 TABLET ORAL at 09:45

## 2019-01-01 RX ADMIN — HYDRALAZINE HYDROCHLORIDE 10 MG: 20 INJECTION INTRAMUSCULAR; INTRAVENOUS at 05:41

## 2019-01-01 RX ADMIN — ALBUTEROL SULFATE 2.5 MG: 2.5 SOLUTION RESPIRATORY (INHALATION) at 13:26

## 2019-01-01 RX ADMIN — ATENOLOL 25 MG: 25 TABLET ORAL at 21:50

## 2019-01-01 RX ADMIN — NITROGLYCERIN 0.5 INCH: 20 OINTMENT TOPICAL at 00:46

## 2019-01-01 RX ADMIN — PANTOPRAZOLE SODIUM 40 MG: 40 INJECTION, POWDER, FOR SOLUTION INTRAVENOUS at 09:11

## 2019-01-01 RX ADMIN — PIPERACILLIN AND TAZOBACTAM 2.25 G: 2; .25 INJECTION, POWDER, LYOPHILIZED, FOR SOLUTION INTRAVENOUS at 05:25

## 2019-01-01 RX ADMIN — AMLODIPINE BESYLATE 10 MG: 10 TABLET ORAL at 10:23

## 2019-01-01 RX ADMIN — AMLODIPINE BESYLATE 10 MG: 10 TABLET ORAL at 09:20

## 2019-01-01 RX ADMIN — MUPIROCIN: 20 OINTMENT TOPICAL at 22:50

## 2019-01-01 RX ADMIN — GUAIFENESIN 200 MG: 200 SOLUTION ORAL at 21:41

## 2019-01-01 RX ADMIN — CHLORHEXIDINE GLUCONATE 0.12% ORAL RINSE 15 ML: 1.2 LIQUID ORAL at 09:45

## 2019-01-01 RX ADMIN — ATENOLOL 50 MG: 50 TABLET ORAL at 08:23

## 2019-01-01 RX ADMIN — MICONAZOLE NITRATE: 20 POWDER TOPICAL at 08:08

## 2019-01-01 RX ADMIN — ISOSORBIDE MONONITRATE 60 MG: 60 TABLET, EXTENDED RELEASE ORAL at 10:32

## 2019-01-01 RX ADMIN — ISOSORBIDE MONONITRATE 60 MG: 60 TABLET, EXTENDED RELEASE ORAL at 08:41

## 2019-01-01 RX ADMIN — CHLORHEXIDINE GLUCONATE 0.12% ORAL RINSE 15 ML: 1.2 LIQUID ORAL at 21:49

## 2019-01-01 RX ADMIN — AMLODIPINE BESYLATE 10 MG: 10 TABLET ORAL at 08:36

## 2019-01-01 RX ADMIN — PIPERACILLIN AND TAZOBACTAM 2.25 G: 2; .25 INJECTION, POWDER, LYOPHILIZED, FOR SOLUTION INTRAVENOUS at 20:53

## 2019-01-01 RX ADMIN — PREDNISONE 40 MG: 10 TABLET ORAL at 12:52

## 2019-01-01 RX ADMIN — GUAIFENESIN 600 MG: 600 TABLET, EXTENDED RELEASE ORAL at 21:55

## 2019-01-01 RX ADMIN — PIPERACILLIN AND TAZOBACTAM 2.25 G: 2; .25 INJECTION, POWDER, LYOPHILIZED, FOR SOLUTION INTRAVENOUS at 13:17

## 2019-01-01 RX ADMIN — CLOPIDOGREL BISULFATE 75 MG: 75 TABLET ORAL at 09:45

## 2019-01-01 RX ADMIN — CHLORHEXIDINE GLUCONATE 0.12% ORAL RINSE 15 ML: 1.2 LIQUID ORAL at 20:30

## 2019-01-01 RX ADMIN — Medication 10 ML: at 21:06

## 2019-01-01 RX ADMIN — Medication 10 ML: at 21:56

## 2019-01-01 RX ADMIN — POTASSIUM CHLORIDE 40 MEQ: 20 TABLET, EXTENDED RELEASE ORAL at 23:17

## 2019-01-01 RX ADMIN — ALBUTEROL SULFATE 2.5 MG: 2.5 SOLUTION RESPIRATORY (INHALATION) at 19:05

## 2019-01-01 RX ADMIN — GUAIFENESIN 600 MG: 600 TABLET, EXTENDED RELEASE ORAL at 20:31

## 2019-01-01 RX ADMIN — POTASSIUM CHLORIDE 40 MEQ: 1.5 POWDER, FOR SOLUTION ORAL at 11:41

## 2019-01-01 RX ADMIN — ALBUTEROL SULFATE 2.5 MG: 2.5 SOLUTION RESPIRATORY (INHALATION) at 20:56

## 2019-01-01 RX ADMIN — ISOSORBIDE MONONITRATE 60 MG: 60 TABLET, EXTENDED RELEASE ORAL at 10:23

## 2019-01-01 RX ADMIN — ALBUTEROL SULFATE 2.5 MG: 2.5 SOLUTION RESPIRATORY (INHALATION) at 13:17

## 2019-01-01 RX ADMIN — ACETYLCYSTEINE 600 MG: 200 SOLUTION ORAL; RESPIRATORY (INHALATION) at 21:03

## 2019-01-01 RX ADMIN — Medication 10 ML: at 20:43

## 2019-01-01 RX ADMIN — LEVETIRACETAM 500 MG: 100 SOLUTION ORAL at 09:53

## 2019-01-01 RX ADMIN — LEVETIRACETAM 1000 MG: 100 INJECTION, SOLUTION, CONCENTRATE INTRAVENOUS at 22:14

## 2019-01-01 RX ADMIN — Medication 10 ML: at 09:13

## 2019-01-01 RX ADMIN — BUDESONIDE 500 MCG: 0.5 SUSPENSION RESPIRATORY (INHALATION) at 21:00

## 2019-01-01 RX ADMIN — ALBUTEROL SULFATE 2.5 MG: 2.5 SOLUTION RESPIRATORY (INHALATION) at 14:03

## 2019-01-01 RX ADMIN — CHLORHEXIDINE GLUCONATE 0.12% ORAL RINSE 15 ML: 1.2 LIQUID ORAL at 14:20

## 2019-01-01 RX ADMIN — ATENOLOL 50 MG: 50 TABLET ORAL at 20:14

## 2019-01-01 RX ADMIN — PIPERACILLIN AND TAZOBACTAM 2.25 G: 2; .25 INJECTION, POWDER, LYOPHILIZED, FOR SOLUTION INTRAVENOUS at 20:51

## 2019-01-01 RX ADMIN — PIPERACILLIN AND TAZOBACTAM 2.25 G: 2; .25 INJECTION, POWDER, LYOPHILIZED, FOR SOLUTION INTRAVENOUS at 13:33

## 2019-01-01 RX ADMIN — Medication 10 ML: at 09:12

## 2019-01-01 RX ADMIN — Medication 10 ML: at 08:14

## 2019-01-01 RX ADMIN — LEVETIRACETAM 500 MG: 500 INJECTION, SOLUTION INTRAVENOUS at 10:32

## 2019-01-01 RX ADMIN — Medication 10 ML: at 21:30

## 2019-01-01 RX ADMIN — Medication 10 ML: at 09:59

## 2019-01-01 RX ADMIN — PIPERACILLIN AND TAZOBACTAM 2.25 G: 2; .25 INJECTION, POWDER, LYOPHILIZED, FOR SOLUTION INTRAVENOUS at 21:43

## 2019-01-01 RX ADMIN — PIPERACILLIN AND TAZOBACTAM 3.38 G: 3; .375 INJECTION, POWDER, LYOPHILIZED, FOR SOLUTION INTRAVENOUS at 01:07

## 2019-01-01 RX ADMIN — LEVETIRACETAM 500 MG: 500 INJECTION, SOLUTION INTRAVENOUS at 11:45

## 2019-01-01 RX ADMIN — Medication 10 ML: at 20:20

## 2019-01-01 RX ADMIN — CHLORHEXIDINE GLUCONATE 0.12% ORAL RINSE 15 ML: 1.2 LIQUID ORAL at 09:23

## 2019-01-01 RX ADMIN — GUAIFENESIN 600 MG: 600 TABLET, EXTENDED RELEASE ORAL at 21:08

## 2019-01-01 RX ADMIN — CHLORHEXIDINE GLUCONATE 0.12% ORAL RINSE 15 ML: 1.2 LIQUID ORAL at 09:20

## 2019-01-01 RX ADMIN — BARIUM SULFATE 450 ML: 20 SUSPENSION ORAL at 13:53

## 2019-01-01 RX ADMIN — AMLODIPINE BESYLATE 10 MG: 10 TABLET ORAL at 11:16

## 2019-01-01 RX ADMIN — PREDNISONE 40 MG: 10 TABLET ORAL at 09:11

## 2019-01-01 RX ADMIN — AMLODIPINE BESYLATE 10 MG: 10 TABLET ORAL at 15:29

## 2019-01-01 RX ADMIN — Medication 10 ML: at 08:46

## 2019-01-01 RX ADMIN — BUDESONIDE 500 MCG: 0.5 SUSPENSION RESPIRATORY (INHALATION) at 15:06

## 2019-01-01 RX ADMIN — ATENOLOL 12.5 MG: 25 TABLET ORAL at 09:16

## 2019-01-01 RX ADMIN — ATORVASTATIN CALCIUM 40 MG: 40 TABLET, FILM COATED ORAL at 20:14

## 2019-01-01 RX ADMIN — SODIUM CHLORIDE: 9 INJECTION, SOLUTION INTRAVENOUS at 21:16

## 2019-01-01 RX ADMIN — MUPIROCIN: 20 OINTMENT TOPICAL at 08:41

## 2019-01-01 RX ADMIN — PROPOFOL 30 MCG/KG/MIN: 10 INJECTION, EMULSION INTRAVENOUS at 17:12

## 2019-01-01 RX ADMIN — Medication 10 ML: at 20:00

## 2019-01-01 RX ADMIN — SODIUM CHLORIDE: 900 INJECTION INTRAVENOUS at 13:08

## 2019-01-01 RX ADMIN — HYDRALAZINE HYDROCHLORIDE 10 MG: 20 INJECTION INTRAMUSCULAR; INTRAVENOUS at 22:57

## 2019-01-01 RX ADMIN — ALBUTEROL SULFATE 2 PUFF: 90 AEROSOL, METERED RESPIRATORY (INHALATION) at 04:30

## 2019-01-01 RX ADMIN — ACETYLCYSTEINE 600 MG: 200 SOLUTION ORAL; RESPIRATORY (INHALATION) at 07:53

## 2019-01-01 RX ADMIN — PROPOFOL 30 MCG/KG/MIN: 10 INJECTION, EMULSION INTRAVENOUS at 02:03

## 2019-01-01 RX ADMIN — ASPIRIN 81 MG 81 MG: 81 TABLET ORAL at 10:00

## 2019-01-01 RX ADMIN — LEVETIRACETAM 500 MG: 100 INJECTION, SOLUTION, CONCENTRATE INTRAVENOUS at 21:43

## 2019-01-01 RX ADMIN — ATENOLOL 50 MG: 50 TABLET ORAL at 21:46

## 2019-01-01 RX ADMIN — Medication 10 ML: at 21:42

## 2019-01-01 RX ADMIN — LEVETIRACETAM 500 MG: 500 TABLET, FILM COATED ORAL at 09:47

## 2019-01-01 RX ADMIN — Medication 10 ML: at 21:51

## 2019-01-01 RX ADMIN — ASPIRIN 81 MG 81 MG: 81 TABLET ORAL at 09:10

## 2019-01-01 RX ADMIN — ISOSORBIDE MONONITRATE 60 MG: 60 TABLET, EXTENDED RELEASE ORAL at 08:23

## 2019-01-01 RX ADMIN — BUDESONIDE 500 MCG: 0.5 SUSPENSION RESPIRATORY (INHALATION) at 04:13

## 2019-01-01 RX ADMIN — Medication 10 ML: at 09:52

## 2019-01-01 RX ADMIN — GUAIFENESIN 200 MG: 200 SOLUTION ORAL at 05:42

## 2019-01-01 RX ADMIN — Medication 10 ML: at 10:51

## 2019-01-01 RX ADMIN — CHLORHEXIDINE GLUCONATE 0.12% ORAL RINSE 15 ML: 1.2 LIQUID ORAL at 08:13

## 2019-01-01 RX ADMIN — MICONAZOLE NITRATE: 20 POWDER TOPICAL at 22:00

## 2019-01-01 RX ADMIN — ALBUTEROL SULFATE 2 PUFF: 90 AEROSOL, METERED RESPIRATORY (INHALATION) at 11:00

## 2019-01-01 RX ADMIN — SODIUM CHLORIDE 8 MG/HR: 9 INJECTION, SOLUTION INTRAVENOUS at 11:43

## 2019-01-01 RX ADMIN — ISOSORBIDE MONONITRATE 60 MG: 60 TABLET, EXTENDED RELEASE ORAL at 15:29

## 2019-01-01 RX ADMIN — CEFUROXIME AXETIL 250 MG: 500 TABLET ORAL at 09:47

## 2019-01-01 RX ADMIN — LEVETIRACETAM 500 MG: 100 INJECTION, SOLUTION, CONCENTRATE INTRAVENOUS at 08:52

## 2019-01-01 RX ADMIN — MICONAZOLE NITRATE: 20 POWDER TOPICAL at 08:23

## 2019-01-01 RX ADMIN — Medication 10 ML: at 10:49

## 2019-01-01 RX ADMIN — GUAIFENESIN 200 MG: 200 SOLUTION ORAL at 12:58

## 2019-01-01 RX ADMIN — LEVETIRACETAM 500 MG: 500 INJECTION, SOLUTION INTRAVENOUS at 12:33

## 2019-01-01 RX ADMIN — ROSUVASTATIN CALCIUM 10 MG: 5 TABLET, FILM COATED ORAL at 09:16

## 2019-01-01 RX ADMIN — SODIUM CHLORIDE 8 MG/HR: 9 INJECTION, SOLUTION INTRAVENOUS at 22:43

## 2019-01-01 RX ADMIN — ATORVASTATIN CALCIUM 40 MG: 40 TABLET, FILM COATED ORAL at 20:03

## 2019-01-01 RX ADMIN — Medication 10 ML: at 09:00

## 2019-01-01 RX ADMIN — ETOMIDATE 20 MG: 2 INJECTION, SOLUTION INTRAVENOUS at 21:59

## 2019-01-01 RX ADMIN — PIPERACILLIN AND TAZOBACTAM 3.38 G: 3; .375 INJECTION, POWDER, LYOPHILIZED, FOR SOLUTION INTRAVENOUS at 12:33

## 2019-01-01 RX ADMIN — GUAIFENESIN 600 MG: 600 TABLET, EXTENDED RELEASE ORAL at 20:03

## 2019-01-01 RX ADMIN — Medication 10 ML: at 21:48

## 2019-01-01 RX ADMIN — SODIUM CHLORIDE 250 ML: 9 INJECTION, SOLUTION INTRAVENOUS at 18:27

## 2019-01-01 RX ADMIN — ASPIRIN 81 MG 81 MG: 81 TABLET ORAL at 09:54

## 2019-01-01 RX ADMIN — PANTOPRAZOLE SODIUM 40 MG: 40 INJECTION, POWDER, FOR SOLUTION INTRAVENOUS at 20:03

## 2019-01-01 RX ADMIN — Medication 10 ML: at 08:09

## 2019-01-01 RX ADMIN — LEVETIRACETAM 500 MG: 500 INJECTION, SOLUTION INTRAVENOUS at 11:05

## 2019-01-01 RX ADMIN — ALBUTEROL SULFATE 5 MG: 2.5 SOLUTION RESPIRATORY (INHALATION) at 15:50

## 2019-01-01 RX ADMIN — FUROSEMIDE 40 MG: 10 INJECTION, SOLUTION INTRAMUSCULAR; INTRAVENOUS at 11:00

## 2019-01-01 RX ADMIN — PIPERACILLIN AND TAZOBACTAM 3.38 G: 3; .375 INJECTION, POWDER, LYOPHILIZED, FOR SOLUTION INTRAVENOUS at 21:47

## 2019-01-01 RX ADMIN — GUAIFENESIN 600 MG: 600 TABLET, EXTENDED RELEASE ORAL at 21:53

## 2019-01-01 RX ADMIN — MICONAZOLE NITRATE: 20 POWDER TOPICAL at 09:34

## 2019-01-01 RX ADMIN — ACETAMINOPHEN 650 MG: 325 TABLET ORAL at 05:55

## 2019-01-01 RX ADMIN — DOXYCYCLINE 100 MG: 100 INJECTION, POWDER, LYOPHILIZED, FOR SOLUTION INTRAVENOUS at 09:59

## 2019-01-01 RX ADMIN — PANTOPRAZOLE SODIUM 40 MG: 40 INJECTION, POWDER, FOR SOLUTION INTRAVENOUS at 21:44

## 2019-01-01 RX ADMIN — BUDESONIDE 500 MCG: 0.5 SUSPENSION RESPIRATORY (INHALATION) at 04:44

## 2019-01-01 RX ADMIN — MICONAZOLE NITRATE: 20 POWDER TOPICAL at 08:15

## 2019-01-01 RX ADMIN — Medication 10 ML: at 10:02

## 2019-01-01 RX ADMIN — PIPERACILLIN AND TAZOBACTAM 3.38 G: 3; .375 INJECTION, POWDER, LYOPHILIZED, FOR SOLUTION INTRAVENOUS at 02:04

## 2019-01-01 RX ADMIN — ALBUTEROL SULFATE 2.5 MG: 2.5 SOLUTION RESPIRATORY (INHALATION) at 04:32

## 2019-01-01 RX ADMIN — ALBUTEROL SULFATE 2 PUFF: 90 AEROSOL, METERED RESPIRATORY (INHALATION) at 05:23

## 2019-01-01 RX ADMIN — HEPARIN SODIUM 5000 UNITS: 5000 INJECTION, SOLUTION INTRAVENOUS; SUBCUTANEOUS at 09:53

## 2019-01-01 RX ADMIN — PROPOFOL 20 MG: 10 INJECTION, EMULSION INTRAVENOUS at 13:12

## 2019-01-01 RX ADMIN — LEVETIRACETAM 500 MG: 100 INJECTION, SOLUTION, CONCENTRATE INTRAVENOUS at 21:54

## 2019-01-01 RX ADMIN — LEVETIRACETAM 500 MG: 500 TABLET, FILM COATED ORAL at 21:46

## 2019-01-01 RX ADMIN — ACYCLOVIR SODIUM 190 MG: 50 INJECTION, SOLUTION INTRAVENOUS at 00:46

## 2019-01-01 RX ADMIN — ALBUTEROL SULFATE 2 PUFF: 90 AEROSOL, METERED RESPIRATORY (INHALATION) at 20:48

## 2019-01-01 RX ADMIN — ATORVASTATIN CALCIUM 40 MG: 40 TABLET, FILM COATED ORAL at 22:52

## 2019-01-01 RX ADMIN — PIPERACILLIN AND TAZOBACTAM 2.25 G: 2; .25 INJECTION, POWDER, LYOPHILIZED, FOR SOLUTION INTRAVENOUS at 21:00

## 2019-01-01 RX ADMIN — ATENOLOL 50 MG: 50 TABLET ORAL at 09:53

## 2019-01-01 RX ADMIN — BUDESONIDE 500 MCG: 0.5 SUSPENSION RESPIRATORY (INHALATION) at 08:06

## 2019-01-01 RX ADMIN — ATORVASTATIN CALCIUM 40 MG: 40 TABLET, FILM COATED ORAL at 20:53

## 2019-01-01 RX ADMIN — AMLODIPINE BESYLATE 10 MG: 10 TABLET ORAL at 09:46

## 2019-01-01 RX ADMIN — DOXYCYCLINE 100 MG: 100 INJECTION, POWDER, LYOPHILIZED, FOR SOLUTION INTRAVENOUS at 08:37

## 2019-01-01 RX ADMIN — ATENOLOL 12.5 MG: 25 TABLET ORAL at 09:54

## 2019-01-01 RX ADMIN — LEVETIRACETAM 500 MG: 500 INJECTION, SOLUTION INTRAVENOUS at 22:26

## 2019-01-01 RX ADMIN — Medication 10 ML: at 21:54

## 2019-01-01 RX ADMIN — PROPOFOL 15 MCG/KG/MIN: 10 INJECTION, EMULSION INTRAVENOUS at 13:25

## 2019-01-01 RX ADMIN — PANTOPRAZOLE SODIUM 40 MG: 40 INJECTION, POWDER, FOR SOLUTION INTRAVENOUS at 21:08

## 2019-01-01 RX ADMIN — ATENOLOL 50 MG: 50 TABLET ORAL at 09:59

## 2019-01-01 RX ADMIN — SODIUM CHLORIDE 8 MG/HR: 9 INJECTION, SOLUTION INTRAVENOUS at 15:28

## 2019-01-01 RX ADMIN — HYDRALAZINE HYDROCHLORIDE 10 MG: 20 INJECTION INTRAMUSCULAR; INTRAVENOUS at 02:02

## 2019-01-01 RX ADMIN — WARFARIN SODIUM 5 MG: 5 TABLET ORAL at 18:24

## 2019-01-01 RX ADMIN — PIPERACILLIN AND TAZOBACTAM 2.25 G: 2; .25 INJECTION, POWDER, LYOPHILIZED, FOR SOLUTION INTRAVENOUS at 11:39

## 2019-01-01 RX ADMIN — BUDESONIDE 500 MCG: 0.5 SUSPENSION RESPIRATORY (INHALATION) at 20:01

## 2019-01-01 RX ADMIN — PIPERACILLIN AND TAZOBACTAM 3.38 G: 3; .375 INJECTION, POWDER, LYOPHILIZED, FOR SOLUTION INTRAVENOUS at 13:14

## 2019-01-01 RX ADMIN — LEVETIRACETAM 500 MG: 500 TABLET, FILM COATED ORAL at 20:52

## 2019-01-01 RX ADMIN — SODIUM CHLORIDE: 9 INJECTION, SOLUTION INTRAVENOUS at 21:02

## 2019-01-01 RX ADMIN — PANTOPRAZOLE SODIUM 40 MG: 40 INJECTION, POWDER, FOR SOLUTION INTRAVENOUS at 09:19

## 2019-01-01 RX ADMIN — ISOSORBIDE MONONITRATE 60 MG: 60 TABLET, EXTENDED RELEASE ORAL at 08:38

## 2019-01-01 RX ADMIN — MICONAZOLE NITRATE: 20 POWDER TOPICAL at 20:37

## 2019-01-01 RX ADMIN — GUAIFENESIN 600 MG: 600 TABLET, EXTENDED RELEASE ORAL at 20:20

## 2019-01-01 RX ADMIN — Medication 10 ML: at 20:47

## 2019-01-01 RX ADMIN — GUAIFENESIN 600 MG: 600 TABLET, EXTENDED RELEASE ORAL at 20:53

## 2019-01-01 RX ADMIN — ALBUTEROL SULFATE 2.5 MG: 2.5 SOLUTION RESPIRATORY (INHALATION) at 07:25

## 2019-01-01 RX ADMIN — PANTOPRAZOLE SODIUM 40 MG: 40 INJECTION, POWDER, FOR SOLUTION INTRAVENOUS at 10:40

## 2019-01-01 RX ADMIN — GUAIFENESIN 600 MG: 600 TABLET, EXTENDED RELEASE ORAL at 20:52

## 2019-01-01 RX ADMIN — PIPERACILLIN AND TAZOBACTAM 2.25 G: 2; .25 INJECTION, POWDER, LYOPHILIZED, FOR SOLUTION INTRAVENOUS at 20:39

## 2019-01-01 RX ADMIN — PANTOPRAZOLE SODIUM 40 MG: 40 INJECTION, POWDER, FOR SOLUTION INTRAVENOUS at 08:24

## 2019-01-01 RX ADMIN — ROSUVASTATIN CALCIUM 10 MG: 5 TABLET, FILM COATED ORAL at 09:54

## 2019-01-01 RX ADMIN — LEVETIRACETAM 500 MG: 500 INJECTION, SOLUTION INTRAVENOUS at 22:28

## 2019-01-01 RX ADMIN — GUAIFENESIN 600 MG: 600 TABLET, EXTENDED RELEASE ORAL at 10:23

## 2019-01-01 RX ADMIN — ATENOLOL 50 MG: 50 TABLET ORAL at 20:30

## 2019-01-01 RX ADMIN — SODIUM CHLORIDE 1000 ML: 9 INJECTION, SOLUTION INTRAVENOUS at 01:21

## 2019-01-01 RX ADMIN — PROPOFOL 35 MCG/KG/MIN: 10 INJECTION, EMULSION INTRAVENOUS at 21:53

## 2019-01-01 RX ADMIN — PIPERACILLIN AND TAZOBACTAM 2.25 G: 2; .25 INJECTION, POWDER, LYOPHILIZED, FOR SOLUTION INTRAVENOUS at 05:49

## 2019-01-01 RX ADMIN — METHYLPREDNISOLONE SODIUM SUCCINATE 125 MG: 125 INJECTION, POWDER, FOR SOLUTION INTRAMUSCULAR; INTRAVENOUS at 15:30

## 2019-01-01 RX ADMIN — ATENOLOL 50 MG: 50 TABLET ORAL at 21:53

## 2019-01-01 RX ADMIN — BUDESONIDE 500 MCG: 0.5 SUSPENSION RESPIRATORY (INHALATION) at 20:41

## 2019-01-01 RX ADMIN — Medication 10 ML: at 08:08

## 2019-01-01 RX ADMIN — PIPERACILLIN AND TAZOBACTAM 2.25 G: 2; .25 INJECTION, POWDER, LYOPHILIZED, FOR SOLUTION INTRAVENOUS at 04:50

## 2019-01-01 RX ADMIN — NITROGLYCERIN 0.5 INCH: 20 OINTMENT TOPICAL at 05:25

## 2019-01-01 RX ADMIN — PIPERACILLIN AND TAZOBACTAM 3.38 G: 3; .375 INJECTION, POWDER, LYOPHILIZED, FOR SOLUTION INTRAVENOUS at 22:31

## 2019-01-01 RX ADMIN — ISOSORBIDE MONONITRATE 60 MG: 60 TABLET, EXTENDED RELEASE ORAL at 09:53

## 2019-01-01 RX ADMIN — METHYLPREDNISOLONE SODIUM SUCCINATE 125 MG: 125 INJECTION, POWDER, FOR SOLUTION INTRAMUSCULAR; INTRAVENOUS at 21:46

## 2019-01-01 RX ADMIN — GUAIFENESIN 200 MG: 200 SOLUTION ORAL at 09:11

## 2019-01-01 RX ADMIN — PANTOPRAZOLE SODIUM 40 MG: 40 INJECTION, POWDER, FOR SOLUTION INTRAVENOUS at 13:16

## 2019-01-01 RX ADMIN — Medication 10 ML: at 14:58

## 2019-01-01 RX ADMIN — NITROGLYCERIN 0.5 INCH: 20 OINTMENT TOPICAL at 00:11

## 2019-01-01 RX ADMIN — ALBUTEROL SULFATE 5 MG: 2.5 SOLUTION RESPIRATORY (INHALATION) at 15:45

## 2019-01-01 RX ADMIN — HYDRALAZINE HYDROCHLORIDE 5 MG: 20 INJECTION INTRAMUSCULAR; INTRAVENOUS at 10:19

## 2019-01-01 RX ADMIN — ATENOLOL 25 MG: 25 TABLET ORAL at 09:16

## 2019-01-01 RX ADMIN — PIPERACILLIN AND TAZOBACTAM 3.38 G: 3; .375 INJECTION, POWDER, LYOPHILIZED, FOR SOLUTION INTRAVENOUS at 14:51

## 2019-01-01 RX ADMIN — GUAIFENESIN 600 MG: 600 TABLET, EXTENDED RELEASE ORAL at 23:05

## 2019-01-01 RX ADMIN — PIPERACILLIN AND TAZOBACTAM 3.38 G: 3; .375 INJECTION, POWDER, LYOPHILIZED, FOR SOLUTION INTRAVENOUS at 13:21

## 2019-01-01 RX ADMIN — SODIUM CHLORIDE: 9 INJECTION, SOLUTION INTRAVENOUS at 11:06

## 2019-01-01 RX ADMIN — Medication 10 ML: at 08:44

## 2019-01-01 RX ADMIN — Medication 10 ML: at 21:53

## 2019-01-01 RX ADMIN — SODIUM CHLORIDE 250 ML: 9 INJECTION, SOLUTION INTRAVENOUS at 21:00

## 2019-01-01 RX ADMIN — ALBUTEROL SULFATE 2.5 MG: 2.5 SOLUTION RESPIRATORY (INHALATION) at 11:38

## 2019-01-01 RX ADMIN — ALBUTEROL SULFATE 2.5 MG: 2.5 SOLUTION RESPIRATORY (INHALATION) at 22:02

## 2019-01-01 RX ADMIN — PIPERACILLIN AND TAZOBACTAM 2.25 G: 2; .25 INJECTION, POWDER, LYOPHILIZED, FOR SOLUTION INTRAVENOUS at 10:15

## 2019-01-01 RX ADMIN — WARFARIN SODIUM 6 MG: 5 TABLET ORAL at 17:38

## 2019-01-01 RX ADMIN — Medication 10 ML: at 21:04

## 2019-01-01 RX ADMIN — ATORVASTATIN CALCIUM 40 MG: 40 TABLET, FILM COATED ORAL at 20:58

## 2019-01-01 RX ADMIN — LEVETIRACETAM 500 MG: 500 INJECTION, SOLUTION INTRAVENOUS at 23:18

## 2019-01-01 RX ADMIN — ALBUTEROL SULFATE 2.5 MG: 2.5 SOLUTION RESPIRATORY (INHALATION) at 07:52

## 2019-01-01 RX ADMIN — ATENOLOL 50 MG: 50 TABLET ORAL at 10:46

## 2019-01-01 RX ADMIN — ATENOLOL 50 MG: 50 TABLET ORAL at 09:12

## 2019-01-01 RX ADMIN — ALBUTEROL SULFATE 2.5 MG: 2.5 SOLUTION RESPIRATORY (INHALATION) at 07:36

## 2019-01-01 RX ADMIN — ASPIRIN 81 MG 81 MG: 81 TABLET ORAL at 09:50

## 2019-01-01 RX ADMIN — PANTOPRAZOLE SODIUM 40 MG: 40 INJECTION, POWDER, FOR SOLUTION INTRAVENOUS at 08:35

## 2019-01-01 RX ADMIN — ACETYLCYSTEINE 600 MG: 200 SOLUTION ORAL; RESPIRATORY (INHALATION) at 07:55

## 2019-01-01 RX ADMIN — NITROGLYCERIN 0.5 INCH: 20 OINTMENT TOPICAL at 18:29

## 2019-01-01 RX ADMIN — ATENOLOL 50 MG: 50 TABLET ORAL at 21:55

## 2019-01-01 RX ADMIN — ALBUTEROL SULFATE 2.5 MG: 2.5 SOLUTION RESPIRATORY (INHALATION) at 20:08

## 2019-01-01 RX ADMIN — PIPERACILLIN AND TAZOBACTAM 3.38 G: 3; .375 INJECTION, POWDER, LYOPHILIZED, FOR SOLUTION INTRAVENOUS at 00:47

## 2019-01-01 RX ADMIN — LEVETIRACETAM 500 MG: 500 INJECTION, SOLUTION INTRAVENOUS at 11:15

## 2019-01-01 RX ADMIN — Medication 10 ML: at 09:10

## 2019-01-01 RX ADMIN — PROPOFOL 35 MCG/KG/MIN: 10 INJECTION, EMULSION INTRAVENOUS at 14:06

## 2019-01-01 RX ADMIN — ATENOLOL 50 MG: 50 TABLET ORAL at 21:41

## 2019-01-01 RX ADMIN — PIPERACILLIN AND TAZOBACTAM 2.25 G: 2; .25 INJECTION, POWDER, LYOPHILIZED, FOR SOLUTION INTRAVENOUS at 22:54

## 2019-01-01 RX ADMIN — ALBUTEROL SULFATE 2.5 MG: 2.5 SOLUTION RESPIRATORY (INHALATION) at 07:38

## 2019-01-01 RX ADMIN — PIPERACILLIN AND TAZOBACTAM 2.25 G: 2; .25 INJECTION, POWDER, LYOPHILIZED, FOR SOLUTION INTRAVENOUS at 13:02

## 2019-01-01 RX ADMIN — MUPIROCIN: 20 OINTMENT TOPICAL at 21:47

## 2019-01-01 RX ADMIN — Medication 10 ML: at 21:46

## 2019-01-01 RX ADMIN — GUAIFENESIN 600 MG: 600 TABLET, EXTENDED RELEASE ORAL at 21:46

## 2019-01-01 RX ADMIN — ISOSORBIDE MONONITRATE 60 MG: 60 TABLET, EXTENDED RELEASE ORAL at 09:09

## 2019-01-01 RX ADMIN — WARFARIN SODIUM 5 MG: 5 TABLET ORAL at 17:58

## 2019-01-01 RX ADMIN — LEVETIRACETAM 500 MG: 500 TABLET, FILM COATED ORAL at 10:00

## 2019-01-01 RX ADMIN — ASPIRIN 81 MG 81 MG: 81 TABLET ORAL at 09:47

## 2019-01-01 RX ADMIN — PIPERACILLIN AND TAZOBACTAM 2.25 G: 2; .25 INJECTION, POWDER, LYOPHILIZED, FOR SOLUTION INTRAVENOUS at 04:39

## 2019-01-01 RX ADMIN — CLOPIDOGREL BISULFATE 75 MG: 75 TABLET ORAL at 10:47

## 2019-01-01 RX ADMIN — ROSUVASTATIN CALCIUM 20 MG: 20 TABLET, FILM COATED ORAL at 20:46

## 2019-01-01 RX ADMIN — PANTOPRAZOLE SODIUM 40 MG: 40 INJECTION, POWDER, FOR SOLUTION INTRAVENOUS at 09:10

## 2019-01-01 RX ADMIN — PANTOPRAZOLE SODIUM 40 MG: 40 INJECTION, POWDER, FOR SOLUTION INTRAVENOUS at 20:59

## 2019-01-01 RX ADMIN — MAGNESIUM SULFATE HEPTAHYDRATE 1 G: 1 INJECTION, SOLUTION INTRAVENOUS at 00:01

## 2019-01-01 RX ADMIN — POTASSIUM CHLORIDE 40 MEQ: 1.5 POWDER, FOR SOLUTION ORAL at 09:56

## 2019-01-01 RX ADMIN — ALBUTEROL SULFATE 2 PUFF: 90 AEROSOL, METERED RESPIRATORY (INHALATION) at 11:16

## 2019-01-01 RX ADMIN — ISOSORBIDE MONONITRATE 60 MG: 60 TABLET, EXTENDED RELEASE ORAL at 09:20

## 2019-01-01 RX ADMIN — GUAIFENESIN 600 MG: 600 TABLET, EXTENDED RELEASE ORAL at 10:00

## 2019-01-01 RX ADMIN — ALBUTEROL SULFATE 2.5 MG: 2.5 SOLUTION RESPIRATORY (INHALATION) at 20:27

## 2019-01-01 RX ADMIN — ACYCLOVIR SODIUM 190 MG: 50 INJECTION, SOLUTION INTRAVENOUS at 12:19

## 2019-01-01 RX ADMIN — LEVETIRACETAM 500 MG: 500 TABLET, FILM COATED ORAL at 08:42

## 2019-01-01 RX ADMIN — GUAIFENESIN 600 MG: 600 TABLET, EXTENDED RELEASE ORAL at 20:58

## 2019-01-01 RX ADMIN — BUDESONIDE 500 MCG: 0.5 SUSPENSION RESPIRATORY (INHALATION) at 04:29

## 2019-01-01 RX ADMIN — NITROGLYCERIN 1 INCH: 20 OINTMENT TOPICAL at 17:12

## 2019-01-01 RX ADMIN — ASPIRIN 81 MG 81 MG: 81 TABLET ORAL at 09:45

## 2019-01-01 ASSESSMENT — PULMONARY FUNCTION TESTS
PIF_VALUE: 39
PIF_VALUE: 18
PIF_VALUE: 48
PIF_VALUE: 49
PIF_VALUE: 20
PIF_VALUE: 1
PIF_VALUE: 1
PIF_VALUE: 22
PIF_VALUE: 1
PIF_VALUE: 37
PIF_VALUE: 42
PIF_VALUE: 25
PIF_VALUE: 1
PIF_VALUE: 19
PIF_VALUE: 46
PIF_VALUE: 18
PIF_VALUE: 19
PIF_VALUE: 18
PIF_VALUE: 18
PIF_VALUE: 22
PIF_VALUE: 51
PIF_VALUE: 19
PIF_VALUE: 45
PIF_VALUE: 25
PIF_VALUE: 47
PIF_VALUE: 58
PIF_VALUE: 48
PIF_VALUE: 49
PIF_VALUE: 19
PIF_VALUE: 52
PIF_VALUE: 20
PIF_VALUE: 22
PIF_VALUE: 47
PIF_VALUE: 20
PIF_VALUE: 40
PIF_VALUE: 19
PIF_VALUE: 45
PIF_VALUE: 12
PIF_VALUE: 16
PIF_VALUE: 11
PIF_VALUE: 20
PIF_VALUE: 19
PIF_VALUE: 46
PIF_VALUE: 27
PIF_VALUE: 23
PIF_VALUE: 41
PIF_VALUE: 19
PIF_VALUE: 23
PIF_VALUE: 18
PIF_VALUE: 54
PIF_VALUE: 21
PIF_VALUE: 23
PIF_VALUE: 43
PIF_VALUE: 19
PIF_VALUE: 27
PIF_VALUE: 22
PIF_VALUE: 19
PIF_VALUE: 21
PIF_VALUE: 36
PIF_VALUE: 26
PIF_VALUE: 21
PIF_VALUE: 27
PIF_VALUE: 12
PIF_VALUE: 51
PIF_VALUE: 22
PIF_VALUE: 22
PIF_VALUE: 1
PIF_VALUE: 19
PIF_VALUE: 41
PIF_VALUE: 18
PIF_VALUE: 19
PIF_VALUE: 22
PIF_VALUE: 45
PIF_VALUE: 20
PIF_VALUE: 40
PIF_VALUE: 49
PIF_VALUE: 1
PIF_VALUE: 20
PIF_VALUE: 55
PIF_VALUE: 10
PIF_VALUE: 21
PIF_VALUE: 48
PIF_VALUE: 23
PIF_VALUE: 17
PIF_VALUE: 22
PIF_VALUE: 40
PIF_VALUE: 47
PIF_VALUE: 1
PIF_VALUE: 27
PIF_VALUE: 19
PIF_VALUE: 18
PIF_VALUE: 52
PIF_VALUE: 19
PIF_VALUE: 21
PIF_VALUE: 53
PIF_VALUE: 19
PIF_VALUE: 19
PIF_VALUE: 49
PIF_VALUE: 1
PIF_VALUE: 24
PIF_VALUE: 46
PIF_VALUE: 18
PIF_VALUE: 22
PIF_VALUE: 49
PIF_VALUE: 20
PIF_VALUE: 11
PIF_VALUE: 24
PIF_VALUE: 11
PIF_VALUE: 48
PIF_VALUE: 22
PIF_VALUE: 46
PIF_VALUE: 19
PIF_VALUE: 24
PIF_VALUE: 20
PIF_VALUE: 52
PIF_VALUE: 21
PIF_VALUE: 46
PIF_VALUE: 18
PIF_VALUE: 27
PIF_VALUE: 20
PIF_VALUE: 19
PIF_VALUE: 18
PIF_VALUE: 18
PIF_VALUE: 23
PIF_VALUE: 23
PIF_VALUE: 25
PIF_VALUE: 19
PIF_VALUE: 20
PIF_VALUE: 23
PIF_VALUE: 11
PIF_VALUE: 23
PIF_VALUE: 30
PIF_VALUE: 18
PIF_VALUE: 43
PIF_VALUE: 46
PIF_VALUE: 1
PIF_VALUE: 53
PIF_VALUE: 19
PIF_VALUE: 21
PIF_VALUE: 21
PIF_VALUE: 51
PIF_VALUE: 10
PIF_VALUE: 24
PIF_VALUE: 46
PIF_VALUE: 45
PIF_VALUE: 52
PIF_VALUE: 43
PIF_VALUE: 19
PIF_VALUE: 32
PIF_VALUE: 49
PIF_VALUE: 43
PIF_VALUE: 35
PIF_VALUE: 19
PIF_VALUE: 21
PIF_VALUE: 46
PIF_VALUE: 47
PIF_VALUE: 41
PIF_VALUE: 20
PIF_VALUE: 1
PIF_VALUE: 21
PIF_VALUE: 51
PIF_VALUE: 21
PIF_VALUE: 50
PIF_VALUE: 20
PIF_VALUE: 57
PIF_VALUE: 37
PIF_VALUE: 22
PIF_VALUE: 21
PIF_VALUE: 18
PIF_VALUE: 41
PIF_VALUE: 1
PIF_VALUE: 20
PIF_VALUE: 18
PIF_VALUE: 41
PIF_VALUE: 1
PIF_VALUE: 19
PIF_VALUE: 11
PIF_VALUE: 21
PIF_VALUE: 19
PIF_VALUE: 47
PIF_VALUE: 19
PIF_VALUE: 48
PIF_VALUE: 18
PIF_VALUE: 18
PIF_VALUE: 19
PIF_VALUE: 27
PIF_VALUE: 22
PIF_VALUE: 18
PIF_VALUE: 19
PIF_VALUE: 45
PIF_VALUE: 18
PIF_VALUE: 21
PIF_VALUE: 53
PIF_VALUE: 39
PIF_VALUE: 11
PIF_VALUE: 50
PIF_VALUE: 43
PIF_VALUE: 19
PIF_VALUE: 19
PIF_VALUE: 17
PIF_VALUE: 23
PIF_VALUE: 51
PIF_VALUE: 50
PIF_VALUE: 22
PIF_VALUE: 1
PIF_VALUE: 54
PIF_VALUE: 22
PIF_VALUE: 18
PIF_VALUE: 26
PIF_VALUE: 26
PIF_VALUE: 20
PIF_VALUE: 43
PIF_VALUE: 1
PIF_VALUE: 47

## 2019-01-01 ASSESSMENT — ENCOUNTER SYMPTOMS
DIARRHEA: 0
COUGH: 0
CHEST TIGHTNESS: 0
WHEEZING: 0
VOMITING: 0
WHEEZING: 0
VOMITING: 0
CHEST TIGHTNESS: 0
NAUSEA: 0
COUGH: 1
NAUSEA: 0
COUGH: 0
COUGH: 1
ABDOMINAL PAIN: 0
VOMITING: 0
NAUSEA: 0
COLOR CHANGE: 0
WHEEZING: 0
COLOR CHANGE: 0
SHORTNESS OF BREATH: 0
TROUBLE SWALLOWING: 0
WHEEZING: 0
SHORTNESS OF BREATH: 1
WHEEZING: 0
VOMITING: 0
SORE THROAT: 0
WHEEZING: 0
COLOR CHANGE: 0
SHORTNESS OF BREATH: 0
VOMITING: 0
COUGH: 1
TROUBLE SWALLOWING: 0
SHORTNESS OF BREATH: 0
COUGH: 1
DIARRHEA: 0
DIARRHEA: 0
NAUSEA: 0
COLOR CHANGE: 0
COUGH: 0
SHORTNESS OF BREATH: 1
VOMITING: 0
COLOR CHANGE: 0
ABDOMINAL PAIN: 0
CHEST TIGHTNESS: 0
VOMITING: 0
NAUSEA: 0
NAUSEA: 0
ABDOMINAL PAIN: 0
TROUBLE SWALLOWING: 0
COUGH: 0
CHEST TIGHTNESS: 0
CHEST TIGHTNESS: 0
TROUBLE SWALLOWING: 0
COUGH: 1
SHORTNESS OF BREATH: 0
SHORTNESS OF BREATH: 1
TROUBLE SWALLOWING: 0
NAUSEA: 0
COLOR CHANGE: 0
SHORTNESS OF BREATH: 0
ABDOMINAL DISTENTION: 0
CONSTIPATION: 0
DIARRHEA: 0
TROUBLE SWALLOWING: 0
SHORTNESS OF BREATH: 0
CHEST TIGHTNESS: 0
BACK PAIN: 0

## 2019-01-01 ASSESSMENT — PAIN SCALES - WONG BAKER

## 2019-01-01 ASSESSMENT — PAIN SCALES - GENERAL
PAINLEVEL_OUTOF10: 7
PAINLEVEL_OUTOF10: 0
PAINLEVEL_OUTOF10: 3
PAINLEVEL_OUTOF10: 0
PAINLEVEL_OUTOF10: 6
PAINLEVEL_OUTOF10: 0
PAINLEVEL_OUTOF10: 7
PAINLEVEL_OUTOF10: 0
PAINLEVEL_OUTOF10: 6
PAINLEVEL_OUTOF10: 0
PAINLEVEL_OUTOF10: 4
PAINLEVEL_OUTOF10: 0
PAINLEVEL_OUTOF10: 3
PAINLEVEL_OUTOF10: 6
PAINLEVEL_OUTOF10: 0
PAINLEVEL_OUTOF10: 6
PAINLEVEL_OUTOF10: 0
PAINLEVEL_OUTOF10: 3
PAINLEVEL_OUTOF10: 0
PAINLEVEL_OUTOF10: 8
PAINLEVEL_OUTOF10: 0
PAINLEVEL_OUTOF10: 2
PAINLEVEL_OUTOF10: 0
PAINLEVEL_OUTOF10: 2
PAINLEVEL_OUTOF10: 0
PAINLEVEL_OUTOF10: 2
PAINLEVEL_OUTOF10: 0
PAINLEVEL_OUTOF10: 2
PAINLEVEL_OUTOF10: 0

## 2019-01-01 ASSESSMENT — LIFESTYLE VARIABLES: SMOKING_STATUS: 1

## 2019-04-04 NOTE — PROGRESS NOTES
Ms. Gallo Bustamante is a 68 y.o. y/o female with history of chronic embolism and thrombosis of vein who presents today for anticoagulation monitoring and adjustment.   INR 2.5 is therapeutic for this patient (goal range 2-3) and is reflective of 37.5 mg TWD  Patient verifies current dosing regimen, patient able to verbally recall dose  Patient reports no  missed doses since last INR   Patient denies s/sx clotting and/or stroke  Patient denies hematuria, epistaxis, rectal bleeding  Patient denies changes in diet, alcohol, or tobacco use  Reviewed medication list and drug allergies with patient, updated any medication additions or modifications accordingly  Patient also denies any pending medical or dental procedures scheduled at this time  Patient was instructed to continue 37.5mg TWD and RTC 4 weeks

## 2019-04-23 NOTE — PROGRESS NOTES
Ms. Gopi Bernabe is a 68 y.o. y/o female with history of chronic embolism and thrombosis of vein who presents today for anticoagulation monitoring and adjustment. INR 3.4 is supratherapeutic for this patient (goal range 2-3) and is reflective of 37.5 mg TWD  Patient verifies current dosing regimen, patient able to verbally recall dose  Patient reports no  missed doses since last INR   Patient denies s/sx clotting and/or stroke  Patient denies hematuria, epistaxis, rectal bleeding  Patient denies changes in diet, alcohol, or tobacco use  Reviewed medication list and drug allergies with patient, updated any medication additions or modifications accordingly  Patient states she is currently being treated for pneumonia and has been on an antibiotic for a week. States her last dose is today. Also states that she was on a steroid taper.   Patient also denies any pending medical or dental procedures scheduled at this time  Patient was instructed to take 2.5 instead of 5mg today then continue with 37.5mg TWD  and RTC 2 weeks

## 2019-05-08 NOTE — PROGRESS NOTES
Ms. Lindsay Black is a 68 y.o. y/o female with history of Chronic embolism/Thrombosis who presents today for anticoagulation monitoring and adjustment. INR 2.4 is therapeutic for this patient (goal range 2-3) and is reflective of 37.5 mg TWD  Patient verifies current dosing regimen, patient able to verbally recall dose  Patient reports 0  missed doses since last INR   Patient denies s/sx clotting and/or stroke  Patient denies hematuria, epistaxis, rectal bleeding  Patient denies changes in diet, alcohol, or tobacco use  Reviewed medication list and drug allergies with patient, updated any medication additions or modifications accordingly  Patient also denies any pending medical or dental procedures scheduled at this time  Pt is now off antibiotics and steroids.   Patient was instructed to continue 37.5 mg TWD and RTC 3 weeks

## 2019-06-21 NOTE — TELEPHONE ENCOUNTER
Second call.  Left message for patient to schedule appointment  Updated tracker to date that reflects when patient should be contacted next

## 2019-08-21 NOTE — TELEPHONE ENCOUNTER
Contacted patient to reschedule missed appointment.   Patient was in hospital.  Rescheduled for today at 1 pm

## 2019-09-30 NOTE — TELEPHONE ENCOUNTER
First overdue letter sent today for patient based on the following timeline:     Last seen in [facility] clinic: 8/21/19  No call/no show for 9/4/19 appointment   First call: 9/05/19   Second call: 9/11/19 - patient scheduled for 9/16/19 (stated she was in the hospital)     No call/no show for 9/16/19 appointment   First attempt to reach patient (voicemail): 9/17/19   Second attempt to reach patient (voicemail): 9/23/19    First overdue letter: 9/30/19   Updated POC INR tracker for 2 weeks from today's date, when SECOND overdue letter should be sent to patient if she has not contacted clinic by that time.      Serjio Hawk, PharmD   9/30/2019 9:47 AM

## 2019-11-28 PROBLEM — J96.21 ACUTE ON CHRONIC RESPIRATORY FAILURE WITH HYPOXIA AND HYPERCAPNIA (HCC): Status: ACTIVE | Noted: 2019-01-01

## 2019-11-28 PROBLEM — J96.22 ACUTE ON CHRONIC RESPIRATORY FAILURE WITH HYPOXIA AND HYPERCAPNIA (HCC): Status: ACTIVE | Noted: 2019-01-01

## 2019-11-29 PROBLEM — E43 SEVERE MALNUTRITION (HCC): Status: ACTIVE | Noted: 2019-01-01

## 2019-12-13 PROBLEM — K92.2 GI BLEED: Status: ACTIVE | Noted: 2019-01-01

## 2019-12-13 NOTE — ED NOTES
Dr Treasure Swenson notified of low pulse ox. Patient placed on 50% venturi mask.       Chente Fuentes RN  12/13/19 [id number]

## 2019-12-13 NOTE — ED PROVIDER NOTES
[address] [facility] ED  eMERGENCYdEPARTMENT eNCOUnter      Pt Name: Corona Vanegas  MRN: [id number]  Gemagfcynthia 1950  Date of evaluation: 12/13/2019  Palmira Fonseca MD    CHIEF COMPLAINT           HPI  Corona Vanegas is a 68 y.o. female per chart review has a h/o hyperthyroidism, Hlp, asthma, PVD, seizures, COPD, blood clots anticoagulated on Plavix and Coumadin presents to the ED from skilled nursing facility with abnormal Hb of 6.6. + for fatigue. She denies pain, sob, n/v, cp, hematochezia, melena, hematuria, abdominal pain, bowel or bladder changes, fever, chills. Pt recently admitted for pneumonia and found to have pseudomonas pneumonia. Pt is currently on IV zosyn. ROS  Review of Systems   Constitutional: Positive for fatigue. Negative for activity change, chills and fever. HENT: Negative for ear pain and sore throat. Eyes: Negative for visual disturbance. Respiratory: Negative for cough and shortness of breath. Cardiovascular: Negative for chest pain, palpitations and leg swelling. Gastrointestinal: Negative for abdominal pain, diarrhea, nausea and vomiting. Genitourinary: Negative for dysuria. Musculoskeletal: Negative for back pain. Skin: Negative for rash. Neurological: Negative for dizziness and weakness. Except as noted above the remainder of the review of systems was reviewed and negative.        PAST MEDICAL HISTORY     Past Medical History:   Diagnosis Date    COPD (chronic obstructive pulmonary disease) [facility] [address]     History of hemodialysis     Hx of blood clots     Hyperlipidemia     Hyperthyroidism          SURGICAL HISTORY       Past Surgical History:   Procedure Laterality Date    CATHETER REMOVAL N/A 12/4/2019    REMOVAL OF DIALYSIS CATHETER performed by Danny Berg MD at [facility]      TX COLON CA SCRN NOT  [address] IND N/A 11/15/2017    COLONOSCOPY performed by Elysia Mendez MD at [facility]  [place] ESOPHAGOGASTRODUODENOSCOPY TRANSORAL DIAGNOSTIC N/A 11/15/2017    EGD ESOPHAGOGASTRODUODENOSCOPY WITH SMALL BOWEL BIOPSY performed by Brandon Donnelly MD at [address] [facility],[address]      L leg DVT         CURRENTMEDICATIONS       Previous Medications    ACETAMINOPHEN (TYLENOL) 325 MG TABLET    Take 650 mg by mouth every 4 hours as needed for Pain or Fever    ALBUTEROL (PROAIR HFA) 108 (90 BASE) MCG/ACT INHALER    Inhale 2 puffs into the lungs every 6 hours as needed for Wheezing. AMLODIPINE (NORVASC) 10 MG TABLET    Take 10 mg by mouth daily. ASPIRIN 81 MG CHEWABLE TABLET    Take 1 tablet by mouth daily    ATENOLOL (TENORMIN) 50 MG TABLET    Take 1 tablet by mouth 2 times daily    ATORVASTATIN (LIPITOR) 40 MG TABLET    Take 40 mg by mouth nightly    CLOPIDOGREL (PLAVIX) 75 MG TABLET    Take 75 mg by mouth daily. FLUTICASONE-VILANTEROL (BREO ELLIPTA) 100-25 MCG/INH AEPB INHALER    Inhale into the lungs daily    FUROSEMIDE (LASIX) 40 MG TABLET    Take 1 tablet by mouth See Admin Instructions Every Monday, Wed and Friday    GUAIFENESIN (MUCINEX) 600 MG EXTENDED RELEASE TABLET    Take 1 tablet by mouth 2 times daily    ISOSORBIDE MONONITRATE (IMDUR) 60 MG EXTENDED RELEASE TABLET    Take 1 tablet by mouth daily    LEVETIRACETAM (KEPPRA) 500 MG TABLET    Take 1 tablet by mouth 2 times daily    LORATADINE (CLARITIN) 10 MG TABLET    Take 10 mg by mouth daily. PIPERACILLIN-TAZOBACTAM (ZOSYN) INFUSION    Infuse 3.375 g intravenously 2 times daily Compound per protocol. TRIAMCINOLONE (ARISTOCORT) 0.5 % CREAM    Apply  topically 3 times daily. Apply topically 3 times daily. UMECLIDINIUM BROMIDE 62.5 MCG/INH AEPB    Inhale into the lungs    VITAMIN D (ERGOCALCIFEROL) 1.25 MG (93671 UT) CAPS CAPSULE    Take 50,000 Units by mouth once a week    WARFARIN (COUMADIN) 5 MG TABLET    Take as directed by [facility] EMERGENCY MEDICAL [place] AT [place] [facility]. Quantity equals 90 day supply. and reactive to light. Neck:      Musculoskeletal: Normal range of motion and neck supple. Cardiovascular:      Rate and Rhythm: Normal rate and regular rhythm. Heart sounds: Normal heart sounds. Pulmonary:      Effort: Pulmonary effort is normal.      Breath sounds: Normal breath sounds. Abdominal:      General: Bowel sounds are normal. There is no distension. Palpations: Abdomen is soft. Tenderness: There is no tenderness. Genitourinary:     Comments: Hemoccult positive  Musculoskeletal: Normal range of motion. Skin:     General: Skin is warm and dry. Comments: Several spots of diffuse petechiae over face, arms   Neurological:      General: No focal deficit present. Mental Status: She is alert and oriented to person, place, and time. MDM  Pt is a 68year old female who presents to the ED with hb of 6.6 and fatigue. She is hypotensive to systolic in the 682'Z and afebrile. She was given 500 cc IV NS, IV Protonix in the ED for suspected GI bleed. EKG shows NSR with HR 60, normal axis, normal intervals, no ST changes. CBC remarkable for Hb of 6.5 and Hct of 19.9. CMP remarkable for Cr of 2.26 and GFR of 21, at patient's baseline. Trop elevated to 0.033. FOBT positive. CXR negative for any acute process. Suspect upper GI bleed from erosive gastritis/PUD. While in the ED, patient became hypoxic to 87% in the ED, placed on non-breather with improvement of sats to 96%. Pt given albuterol nebs x 3, IV solumedrol in the ED. Patient consented to 1 unit of PRBC. Due to hypoxia, severe anemia, GI bleed, COPD exacerbation, case discussed with Dr. Yolanda Lloyd and pt admitted to medicine in serious condition. FINAL IMPRESSION      1. COPD with acute exacerbation ([facility] [address]    2. Hypoxia    3.  Gastrointestinal hemorrhage associated with gastric ulcer          DISPOSITION/PLAN   DISPOSITION Decision To Admit 12/13/2019 03:08:37 PM        DISCHARGE MEDICATIONS:  [unfilled] Robert Saini MD(electronically signed)  Attending Emergency Physician            Robert Saini MD  12/13/19 [id number] Lanny Flores MD  12/13/19 1330

## 2019-12-13 NOTE — H&P
lungs every 6 hours as needed for Wheezing. Historical Provider, MD   amLODIPine (NORVASC) 10 MG tablet Take 10 mg by mouth daily. Historical Provider, MD       Allergies:  Patient has no known allergies. Social History:   TOBACCO:   reports that she has been smoking cigarettes. She has a 82.50 pack-year smoking history. She has never used smokeless tobacco.  ETOH:   reports no history of alcohol use. Family History:   History reviewed. No pertinent family history. REVIEW OF SYSTEMS:  Ten systems reviewed and negative except for stated in HPI    Physical Exam:    Vitals: /74   Pulse 63   Temp 97.2 °F (36.2 °C) (Oral)   Resp 20   Wt 95 lb (43.1 kg)   SpO2 95%   BMI 15.81 kg/m²   General appearance: alert, frail,cachectic female, on VM  Skin: multiple bruises noted  HEENT: Head: Normocephalic, no lesions, without obvious abnormality. Eye: Normal external eye, conjunctiva, lids cornea, NORMAN.   Neck: supple, symmetrical, trachea midline  Lungs: coarse BS with no wheezing  Heart: regular rate and rhythm and S1, S2 normal  Abdomen: soft, BS active  Extremities: no edema  Neurologic: Mental status: Alert, oriented, thought content appropriate     Recent Labs     12/11/19  0624 12/11/19  1816 12/13/19  1415   WBC 7.7 8.2 7.4   HGB 7.6* 7.6* 6.5*    364 353     Recent Labs     12/11/19  0624 12/11/19 1816 12/13/19  1415    140 141   K 4.7 4.6 4.3   * 107 107   CO2 22 22 22   BUN 62* 60* 59*   CREATININE 2.63* 2.64* 2.26*   GLUCOSE 83 106* 124*   AST  --  84* 51*   ALT  --  128* 92*   BILITOT  --  <0.2 <0.2   ALKPHOS  --  68 72     Troponin T:   Recent Labs     12/13/19  1415   TROPONINI 0.033*       ABGs:   Lab Results   Component Value Date    PHART 7.236 12/03/2019    PO2ART 71 12/03/2019    GYZ3GJE 58 12/03/2019     INR:   Recent Labs     12/11/19  0624 12/11/19  1816 12/13/19  1415   INR 1.9 2.0 3.8     URINALYSIS:No results for input(s): NITRITE, COLORU, PHUR, LABCAST,

## 2019-12-14 NOTE — CONSULTS
Findings:     Vitals:   Vitals:    12/14/19 0103 12/14/19 0204 12/14/19 0542 12/14/19 0906   BP: 128/61 129/61  135/66   Pulse: 62 64 51 50   Resp:  18  18   Temp: 97.5 °F (36.4 °C) 97.7 °F (36.5 °C)  97.2 °F (36.2 °C)   TempSrc:  Oral  Oral   SpO2:  95% 95% 93%   Weight:       Height:          Physical Examination:  General: No apparent distress, A/O x3  HEENT: Normocephalic, no scleral icterus. Neck: No JVD. Heart: Regular, no murmur, no rub/gallop. No RV heave. Lungs: diminished  to ascultation, +rhonchi  Abdomen: Appearance:, Distension none, Soft , tenderness - TTP epigastric/RUQ, Bowel sounds normal  Extremities: No clubbing/cyanosis, no edema. Skin: Warm, dry, normal turgor, no rash, + bruise generalized, no petichiae. Neuro: No myoclonus or tremor.    Psych: Normal affect    Results/ Medications reviewed 12/14/2019, 10:20 AM     Laboratory, Microbiology, Pathology, Radiology, Cardiology, Medications and Transcriptions reviewed  Scheduled Meds:   albuterol  2.5 mg Nebulization TID    sodium chloride flush  10 mL Intravenous 2 times per day    sodium chloride (PF)  10 mL Intravenous Once    piperacillin-tazobactam  2.25 g Intravenous Q8H    sodium chloride  250 mL Intravenous Once    levetiracetam  500 mg Intravenous Q12H     Continuous Infusions:   sodium chloride 75 mL/hr at 12/14/19 0540    pantoprozole (PROTONIX) infusion 8 mg/hr (12/13/19 2343)       Recent Labs     12/11/19  1816 12/13/19  1415 12/13/19  1809 12/14/19  0019 12/14/19  0527   WBC 8.2 7.4  --   --  4.2*   HGB 7.6* 6.5* 6.5* 7.1* 7.8*   HCT 23.9* 19.9* 22.1* 22.3* 24.2*   MCV 94.8 92.9  --   --  93.5    353  --   --  318     Recent Labs     12/11/19 1816 12/13/19  1415 12/14/19  0527    141 145*   K 4.6 4.3 4.6    107 107   CO2 22 22 23   PHOS  --   --  6.5*   BUN 60* 59* 57*   CREATININE 2.64* 2.26* 2.41*     Recent Labs     12/11/19 1816 12/13/19  1415 12/14/19  0527   AST 84* 51* 35   * 92* 74*

## 2019-12-14 NOTE — PROGRESS NOTES
Physical Therapy Med Surg Initial Assessment  Facility/Department: [facility]  [facility]       NAME: Faustina Dior  : 1950 (69 y.o.)  MRN: [id number]  CODE STATUS: Full Code    Date of Service: 2019    Patient Diagnosis(es): GI bleed [K92.2]   Chief Complaint   Patient presents with    Abnormal Lab     HGB 6.6 HCT 20.9     Patient Active Problem List    Diagnosis Date Noted    GI bleed 2019    Sepsis due to pneumonia ([address]     Pneumonia of both lower lobes due to Pseudomonas species ([address]     COPD exacerbation ([address]     Pneumonia of left lower lobe due to infectious organism ([address]     Acute cholecystitis     Community acquired pneumonia     Mastoiditis of both sides     Seizure ([address]     Encephalopathy     Severe malnutrition ([address] 2019    Acute on chronic respiratory failure with hypoxia and hypercapnia ([address] 2019    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 ([address] 2017    Skin ulcer of left great toe, limited to breakdown of skin ([address] 2017    Peripheral vascular disease ([address] 2017    Paronychia of great toe, left 2017    Chronic embolism and thrombosis of vein 10/01/2016    Hyperthyroidism 2014    Asthma 2014    Hypercholesteremia 2014        Past Medical History:   Diagnosis Date    COPD (chronic obstructive pulmonary disease) ([address]     History of hemodialysis     Hx of blood clots     Hyperlipidemia     Hyperthyroidism      Past Surgical History:   Procedure Laterality Date    CATHETER REMOVAL N/A 2019    REMOVAL OF DIALYSIS CATHETER performed by Barbra Antunez MD at [facility]      NM COLON CA SCRN NOT  [address] IND N/A 11/15/2017    COLONOSCOPY performed by Jessica Naik MD at [address] ESOPHAGOGASTRODUODENOSCOPY TRANSORAL DIAGNOSTIC N/A 11/15/2017    EGD ESOPHAGOGASTRODUODENOSCOPY WITH SMALL BOWEL BIOPSY performed by Adarsh Kent MD at [address] [facility] leg DVT       Chart Reviewed: Yes  Patient assessed for rehabilitation services?: Yes  Family / Caregiver Present: No  General Comment  Comments: Pt awake in bed, agreeable to PT eval    Restrictions:  Restrictions/Precautions: Fall Risk     SUBJECTIVE: Subjective: Pt frustrated with not being able to eat or drink anything. Explained purpose of NPO for testing. Pt understands. Pre Treatment Pain Screening  Pain at present: 0    Post Treatment Pain Screening:   Pain Screening  Patient Currently in Pain: Denies  Pain Assessment  Pain Level: 0    Prior Level of Function:  Social/Functional History  Lives With: Significant other  Type of Home: House  Home Layout: Two level  Home Access: Stairs to enter with rails  Entrance Stairs - Number of Steps: 4-5  ADL Assistance: Independent  Homemaking Assistance: Independent  Ambulation Assistance: Independent  Transfer Assistance: Independent  Active :  Yes  Additional Comments: pt is questoinable historian, poor recollection of past few weeks history; pt was DC'd to [employer] at her last admission (about 3 weeks ago) but does not recall if she has been home since then or not, does not recall if she was doing therapy    OBJECTIVE:   Vision/Hearing:  Vision: Within Functional Limits  Hearing: Within functional limits    Cognition:  Overall Orientation Status: Impaired  Orientation Level: Oriented to person, Oriented to place(pt knew she was either at [employer] or at [facility] based on her surroundings)  Follows Commands: Within Functional Limits    Observation/Palpation  Posture: Poor(flexed, kyphotic)    ROM:  RLE AROM: WFL  LLE AROM : WFL    Strength:  Strength RLE  Comment: grossly 4-/5  Strength LLE  Comment: grossly 4-/5    Neuro:  Balance  Sitting - Static: Good  Sitting - Dynamic: Good  Standing - Static: Fair  Standing - Dynamic: Fair(maintains static stand without UE support, unable to tolerate dynamic tasks without LOB)             Bed mobility  Supine to Sit: Minimal assistance  Sit to Supine: Stand by assistance    Transfers  Sit to Stand: Contact guard assistance  Stand to sit: Contact guard assistance  Bed to Chair: Contact guard assistance(required UE support to maintain stability during pivot transfers)    Ambulation  Ambulation?: Yes  Ambulation 1  Surface: level tile  Device: Rolling Walker  Assistance: Contact guard assistance  Quality of Gait: short steps and decreased step height/length; pt requiring UE support for stability and CGA due to postural sway  Distance: 10 ft  Comments: limited by quick LE mm fatigue    Activity Tolerance  Activity Tolerance: Patient Tolerated treatment well          ASSESSMENT:   Body structures, Functions, Activity limitations: Decreased functional mobility ; Decreased strength;Decreased endurance;Decreased balance  Decision Making: Medium Complexity  History: high  Exam: medium  Clinical Presentation: medium    Prognosis: Good  PT Education: Goals;PT Role;Precautions;General Safety    DISCHARGE RECOMMENDATIONS:  Discharge Recommendations: Continue to assess pending progress, Patient would benefit from continued therapy after discharge    Assessment: Pt demonstrates the above deficits and decline in functional mobility status placing them at increased risk for falls. Pt would benefit from physical therapy to address above deficits and allow for safe return home at highest level of function, decrease risk for falls, and improve QOL.     REQUIRES PT FOLLOW UP: Yes      PLAN OF CARE:  Plan  Times per week: 3-6  Current Treatment Recommendations: Strengthening, Functional Mobility Training, Neuromuscular Re-education, Home Exercise Program, Equipment Evaluation, Education, & procurement, Transfer Training, Gait Training, Safety Education & Training, Balance Training, Endurance Training, Stair training, Patient/Caregiver Education & Training  Safety Devices  Type of devices: Bed alarm in place, Call light within reach    Goals:  Patient goals : agreeable to PT POC  Long term goals  Long term goal 1: indep with bed mobility  Long term goal 2: supervision with transfers  Long term goal 3: pt to ambulate 50 ft with supervision FWW  Long term goal 4: pt to tolerate >10 min standing dynamic activity     [facility] (6 CLICK) Geral[address] [facility] Inpatient Mobility Raw Score : 16     Therapy Time:   Individual   Time In 1040   Time Out 1058   Minutes 18           Sharon Dumont, PT, 12/14/19 at 11:08 AM

## 2019-12-14 NOTE — PROGRESS NOTES
Hospitalist Progress Note      PCP: Tatianna Mariscal MD    Date of Admission: 12/13/2019    Chief Complaint:  No acute events, afebrile, stable HD, s/p tranfusion of 1 unit of pRBCs and 2 units of FFPs    Medications:  Reviewed    Infusion Medications    sodium chloride 75 mL/hr at 12/14/19 0540    pantoprozole (PROTONIX) infusion 8 mg/hr (12/14/19 1143)     Scheduled Medications    albuterol  2.5 mg Nebulization TID    sodium chloride flush  10 mL Intravenous 2 times per day    sodium chloride (PF)  10 mL Intravenous Once    piperacillin-tazobactam  2.25 g Intravenous Q8H    sodium chloride  250 mL Intravenous Once    levetiracetam  500 mg Intravenous Q12H     PRN Meds: sodium chloride flush, magnesium hydroxide, ondansetron, acetaminophen, albuterol      Intake/Output Summary (Last 24 hours) at 12/14/2019 1311  Last data filed at 12/14/2019 0650  Gross per 24 hour   Intake 2521.67 ml   Output --   Net 2521.67 ml       Exam:    /66   Pulse 50   Temp 97.2 °F (36.2 °C) (Oral)   Resp 18   Ht 5' 5\" (1.651 m)   Wt 91 lb 14.9 oz (41.7 kg)   SpO2 93%   BMI 15.30 kg/m²     General appearance: alert, frail,cachectic female,   Lungs: coarse BS with no wheezing  Heart: regular rate and rhythm and S1, S2 normal  Abdomen: soft, BS active  Extremities: no edema      Labs:   Recent Labs     12/11/19  1816 12/13/19  1415 12/13/19  1809 12/14/19  0019 12/14/19  0527   WBC 8.2 7.4  --   --  4.2*   HGB 7.6* 6.5* 6.5* 7.1* 7.8*   HCT 23.9* 19.9* 22.1* 22.3* 24.2*    353  --   --  318     Recent Labs     12/11/19  1816 12/13/19  1415 12/14/19  0527    141 145*   K 4.6 4.3 4.6    107 107   CO2 22 22 23   BUN 60* 59* 57*   CREATININE 2.64* 2.26* 2.41*   CALCIUM 7.9* 8.0* 8.3*   PHOS  --   --  6.5*     Recent Labs     12/11/19 1816 12/13/19 1415 12/14/19  0527   AST 84* 51* 35   * 92* 74*   BILITOT <0.2 <0.2 <0.2   ALKPHOS 68 72 75     Recent Labs     12/11/19 1816 12/13/19  1415 12/14/19  0527   INR 2.0 3.8 2.2     Recent Labs     12/13/19  1415 12/14/19  0527   TROPONINI 0.033* 0.028*       Urinalysis:      Lab Results   Component Value Date    NITRU Negative 11/28/2019    WBCUA 0-2 11/28/2019    BACTERIA Negative 11/28/2019    RBCUA 0-2 11/28/2019    BLOODU Negative 11/28/2019    SPECGRAV 1.013 11/28/2019    GLUCOSEU 100 11/28/2019       Radiology:  XR CHEST PORTABLE   Final Result   Impression:     1. Persistent left basilar airspace disease suspicious for infiltrate/pneumonia. Continued follow-up to complete resolution is recommended to exclude underlying mass or malignancy. 2. Stable, enlarged mediastinal silhouette with thoracic aortic vascular calcifications.                  Assessment/Plan:    68 y.o. female with PMH of COPD with chronic respiratory failure non compliant with BiPAP, CKD4, hyperthyroidism, seizure disorder, remote lacunar CVAs, DVT on warfarin, who was just discharged from the hospital on 12/11 after being managed for acute hypoxic/ hypercapnic respiratory failure, COPD exacerbation, Pseudomonas pneumonia, sent to SNF on IV Zosyn x 2 weeks and today presented with:     Acute blood loss anemia and GI bleed  - noted to have black,tarry stools at the NH  - Hb was 6.6 on arrival, FOBT was positive, INR-3.8,   - Vit K and 2 units of FFPs administered,  - Hb is 7.0 s/p transfusion of 1 unit of PRBCs, will transfuse one more unit of PRBCs  - keep NPO  - Protonix drip  - GI following, plan for EGD tomorrow     Pseudomonas PNA  - continue IV Zosyn     COPD with chronic respiratory failure  - continue duonebs, O2 therapy  - BiPAP at HS and PRN      CKD 4  - monitor renal function               Electronically signed by Marie Aquino MD on 12/14/2019 at 1:11 PM

## 2019-12-14 NOTE — PROGRESS NOTES
pack years  []   Pulmonary Disorder  (acute or chronic)  [x]   Severe or Chronic w/ Exacerbation  []     Surgical Status No [x]   Surgeries     General []   Surgery Lower []   Abdominal Thoracic or []   Upper Abdominal Thoracic with  PulmonaryDisorder  []     Chest X-ray Clear/Not  Ordered     []  Chronic Changes  Results Pending  []  Infiltrates, atelectasis, pleural effusion, or edema  [x]  Infiltrates in more than one lobe []  Infiltrate + Atelectasis, &/or pleural effusion  []    Respiratory Pattern Regular,  RR = 12-20 [x]  Increased,  RR = 21-25 []  EATON, irregular,  or RR = 26-30 []  Decreased FEV1  or RR = 31-35 []  Severe SOB, use  of accessory muscles, or RR ? 35  []    Mental Status Alert, oriented,  Cooperative [x]  Confused but Follows commands []  Lethargic or unable to follow commands []  Obtunded  []  Comatose  []    Breath Sounds Clear to  auscultation  []  Decreased unilaterally or  in bases only []  Decreased  bilaterally  []  Crackles or intermittent wheezes [x]  Wheezes []    Cough Strong, Spontan., & nonproductive [x]  Strong,  spontaneous, &  productive []  Weak,  Nonproductive []  Weak, productive or  with wheezes []  No spontaneous  cough or may require suctioning []    Level of Activity Ambulatory [x]  Ambulatory w/ Assist  []  Non-ambulatory []  Paraplegic []  Quadriplegic []    Total    Score:___8____     Triage Score:____4____      Tri       Triage:     1. (>20) Freq: Q3    2. (16-20) Freq: Q4   3. (11-15) Freq: QID & Albuterol Q2 PRN    4. (6-10) Freq: TID & Albuterol Q2 PRN    5. (0-5) Freq Q4prn

## 2019-12-14 NOTE — PROGRESS NOTES
Pt cleansed for incontinence of a medium amount of black tarry stool. Pt currently receiving her second unit of FFP. Pt is alert and oriented. Lung with rhonchi noted, however improves with cough. Pt denies SOB or chest pain. Call light in reach. Bed alarm on.        0410:  Lana-care preformed for loose black stool. Pt is alert and oriented. Continues to have a moist cough at times, pt states it is about the same as normal for her. Pt denies pain. States feeling fine. Denies any SOB or CP. Lungs with some rhonchi that clears with cough. Call light in reach. Bed alarm on.

## 2019-12-15 NOTE — OP NOTE
blood oozing from the spots. No obvious ulceration noted. The second portion of the duodenum demonstrated normal mucosa. The scope was then withdrawn back into the stomach, it was decompressed, and the scope was completely withdrawn. The patient tolerated the procedure well and was taken to the post anesthesia care unit in good condition.     EBL: None  Complication: None  Specimen Sent: No    Impression:    - Multiple small areas of ooze in the pyloric channel, duodenal bulb, no obvious ulcer or mass lesion    Recommendations:   -Continue acid suppression with PPI  - Correct INR  - Avoid any anticoagulants or antiplatelet agents    MD MILES [facility]

## 2019-12-15 NOTE — PROGRESS NOTES
[facility] Occupational Therapy      Date: 12/15/2019  Patient Name: Mary Garza        MRN: [id number]  Account: [de-identified]   : 1950  (68 y.o.)  [id number]    Chart reviewed, attempted OT  For evaluation. Patient not seen 2° to:    [] Hold per nsg request    [] Pt declined     [x] Pt. off floor for test/procedure. Patient was having an EGD this pm    Will attempt again when able.     Electronically signed by ALDO Benavides on 12/15/2019 at 4:34 PM

## 2019-12-15 NOTE — PROGRESS NOTES
0732: Shift handoff complete. Pt in bed with eyes closed. No complaints or needs voiced at this time. Bed in lowest posiotion with wheels locked and alarm on. Call light and bedside table within reach. Will continue to monitor throughout this shift. 1330: Dr Tati Howell notified by Ashish Zacarias RN of a critical H&H of 7.0 & 21.7 at this time. N.O for clear liquid diet now, and NPO at midnight for a scope.

## 2019-12-15 NOTE — PROGRESS NOTES
Gastroenterology Progress Note      Aisha Gomez   Hospitalization Day:2    Chief C/o: GI BLEED      SUBJECTIVE: Patient laying in bed, no complaints at this time. RN reports 2 episodes of melena last night. No hematemesis or hematochezia. Epigastric discomfort with palpation. No SOB, but has productive cough. No CP. ROS GI: as mentioned above    Physical    VITALS:  /73   Pulse 55   Temp 98.1 °F (36.7 °C) (Oral)   Resp 18   Ht 5' 5\" (1.651 m)   Wt 91 lb 14.9 oz (41.7 kg)   SpO2 96%   BMI 15.30 kg/m²   TEMPERATURE:  Current - Temp: 98.1 °F (36.7 °C); Max - Temp  Av.8 °F (36.6 °C)  Min: 97.2 °F (36.2 °C)  Max: 98.1 °F (36.7 °C)    General- no apparent distress, A/O x3  Eyes- anicteric sclera, no pallor  Cardiovascular- RRR withtout murmur  Lungs- rhonchi to auscultation bilaterally  Abdomen soft, nondistended, TTP- epigastric , no organomegaly, Bowel sounds normal   Extremities- without edema  Skin- without jaundice    Data      Recent Labs     19  1941 12/15/19  0200 12/15/19  0526   WBC 7.4  --  4.2*  --   --   --  6.2   HGB 6.5*   < > 7.8*   < > 6.6* 7.9* 8.1*   HCT 19.9*   < > 24.2*   < > 20.6* 24.8* 25.4*   MCV 92.9  --  93.5  --   --   --  92.2     --  318  --   --   --  291    < > = values in this interval not displayed. Recent Labs     19    145*   K 4.3 4.6    107   CO2 22 23   PHOS  --  6.5*   BUN 59* 57*   CREATININE 2.26* 2.41*     Recent Labs     19   AST 51* 35   ALT 92* 74*   BILITOT <0.2 <0.2   ALKPHOS 72 75     No results for input(s): LIPASE, AMYLASE in the last 72 hours. Recent Labs     19  1415 19  0527 12/15/19  05   PROTIME 39.1* 25.1* 31.9*   INR 3.8 2.2 2.9       ASSESSMENT :  68year old female patient with acute gastrointestinal hemorrhage. Patient residing at a nursing home for IV antibiotics for pneumonia .  Nursing staff report melena and patient with positive FOBT. On coumadin and aspirin. Patient did experience melena last night with decline in Hb to 6.6. INR today is still elevated at 2.9. She is on Zosyn. PLAN:  - Continue course of care  - Monitor H/H  - 2 unit of FFP now  - Vitamin K 10 mg IV x 1 now  - EGD   - NPO  - Continue PPI infusion     Thank you for allowing me to participate in the care of your patient.   Feel free to contact me with any questions or concerns    Tyrell Mcgregor MD

## 2019-12-15 NOTE — PROGRESS NOTES
Recent Labs     12/13/19  1415 12/14/19  0527 12/15/19  0526   INR 3.8 2.2 2.9     Recent Labs     12/13/19  1415 12/14/19  0527   TROPONINI 0.033* 0.028*       Urinalysis:      Lab Results   Component Value Date    NITRU Negative 11/28/2019    WBCUA 0-2 11/28/2019    BACTERIA Negative 11/28/2019    RBCUA 0-2 11/28/2019    BLOODU Negative 11/28/2019    SPECGRAV 1.013 11/28/2019    GLUCOSEU 100 11/28/2019       Radiology:  XR CHEST PORTABLE   Final Result   Impression:     1. Persistent left basilar airspace disease suspicious for infiltrate/pneumonia. Continued follow-up to complete resolution is recommended to exclude underlying mass or malignancy. 2. Stable, enlarged mediastinal silhouette with thoracic aortic vascular calcifications.                  Assessment/Plan:    68 y.o. female with PMH of COPD with chronic respiratory failure non compliant with BiPAP, CKD4, hyperthyroidism, seizure disorder, remote lacunar CVAs, DVT on warfarin, who was just discharged from the hospital on 12/11 after being managed for acute hypoxic/ hypercapnic respiratory failure, COPD exacerbation, Pseudomonas pneumonia, sent to SNF on IV Zosyn x 2 weeks and today presented with:     Acute blood loss anemia and GI bleed  - noted to have black,tarry stools at the NH  - Hb was 6.6 on arrival, FOBT was positive, INR-3.8,   - Vit K and FFPs administered,  - Hb is 8.0 s/p transfusion of 2 unit of PRBCs,   - EGD done today showed multiple small areas of ooze in the pyloric channel and duodenal bulb,   - continue Protonix infusion  - GI following     Pseudomonas PNA  - continue IV Zosyn, end date 12/24 per ID notes from last admission     COPD with chronic hypoxic respiratory failure  - continue duonebs, O2 therapy  - BiPAP at HS and PRN      CKD 4  - stable, monitor renal function     DVT  - warfarin on hold due to GI bleed  - resume when OK with GI     Bleeding (Hemorrhagic disorder) d/t coumadin        Electronically signed by JUSTICE Nikkie Arias MD on 12/15/2019 at 12:14 PM

## 2019-12-16 PROBLEM — E43 SEVERE MALNUTRITION (HCC): Status: ACTIVE | Noted: 2019-01-01

## 2019-12-16 NOTE — PROGRESS NOTES
Pt's BP at 21:18 was 212/84, Dr. August Blackman was notified, Hydralazine 10 mg was administered as ordered. 1 unit of fresh frozen plasma is transfusing at this time. VS stable, no transfusion reaction noted. Pt is not c/o pain, nausea, or discomfort.

## 2019-12-16 NOTE — PLAN OF CARE
Nutrition Problem: Severe malnutrition, In context of chronic illness  Intervention: Food and/or Nutrient Delivery: Modify current diet, Start ONS(Liberalize to no added salt to promote improved intake; standard high calorie ONS TID)  Nutritional Goals: PO intake >75% of meals/ONS. Weight >91 lbs.

## 2019-12-16 NOTE — PROGRESS NOTES
12/16/19  1218   WBC 4.2*  --  6.2  --   --  7.4  --    HGB 7.8*   < > 8.1*   < > 9.0* 8.2* 7.6*   HCT 24.2*   < > 25.4*   < > 28.4* 25.2* 23.9*     --  291  --   --  281  --     < > = values in this interval not displayed. Recent Labs     12/14/19  0527 12/15/19  0526 12/16/19  0551   * 146* 142   K 4.6 4.0 3.2*    113* 107   CO2 23 21 21   BUN 57* 49* 37*   CREATININE 2.41* 2.25* 2.06*   CALCIUM 8.3* 8.3* 8.1*   PHOS 6.5* 4.6 3.5     Recent Labs     12/14/19  0527 12/15/19  0526 12/16/19  0551   AST 35 45* 30   ALT 74* 76* 60*   BILITOT <0.2 0.2 <0.2   ALKPHOS 75 84 81     Recent Labs     12/14/19  0527 12/15/19  0526 12/16/19  0551   INR 2.2 2.9 1.2     Recent Labs     12/14/19 0527   TROPONINI 0.028*       Urinalysis:      Lab Results   Component Value Date    NITRU Negative 11/28/2019    WBCUA 0-2 11/28/2019    BACTERIA Negative 11/28/2019    RBCUA 0-2 11/28/2019    BLOODU Negative 11/28/2019    SPECGRAV 1.013 11/28/2019    GLUCOSEU 100 11/28/2019       Radiology:  XR CHEST PORTABLE   Final Result   Impression:     1. Persistent left basilar airspace disease suspicious for infiltrate/pneumonia. Continued follow-up to complete resolution is recommended to exclude underlying mass or malignancy. 2. Stable, enlarged mediastinal silhouette with thoracic aortic vascular calcifications.          XR CHEST PORTABLE    (Results Pending)           Assessment/Plan:    68 y.o. female with PMH of COPD with chronic respiratory failure non compliant with BiPAP, CKD4, hyperthyroidism, seizure disorder, remote lacunar CVAs, DVT on warfarin, who was just discharged from the hospital on 12/11 after being managed for acute hypoxic/ hypercapnic respiratory failure, COPD exacerbation, Pseudomonas pneumonia, sent to SNF on IV Zosyn x 2 weeks and today presented with:     # Acute blood loss anemia and GI bleed  - noted to have black,tarry stools at the NH  - Hb was 6.6 on arrival, FOBT was positive, INR-3.8, - Vit K and FFPs administered,  - Hb is 8.0 s/p transfusion of 2 unit of PRBCs,   - EGD done 12/15 showed multiple small areas of ooze in the pyloric channel and duodenal bulb,   - continue Protonix infusion  - GI following     # Pseudomonas PNA  - continue IV Zosyn, end date 12/24 per ID notes from last admission     # COPD with chronic hypoxic respiratory failure  - continue duonebs, O2 therapy  - BiPAP at HS and PRN      # CKD 4  - stable, monitor renal function     # DVT  - warfarin on hold due to GI bleed  - resume when OK with GI     Disposition: Patient sp EGD 12/15 and still on PPI drip. GI following. Patient still requiring high O2. Repeat CXR ordered and pulm consulted for recs. Anticipated length of stay greater than 48 hours.     Tre Oneill DO  Internal Medicine          Electronically signed by Tre Oneill DO on 12/16/2019 at 2:55 PM

## 2019-12-16 NOTE — CARE COORDINATION
Plan is for the pt to [facility]. Pt will need a new precert in order to return to [address] [place] [address]. LSW to follow for transfer back to [facility] when stable.

## 2019-12-16 NOTE — PROGRESS NOTES
1293: Shift handoff complete. Pt in bed with eyes closed. Bed in lowest position with wheels locked and alarm on. No complaints or needs voiced at this time. Call light and bedside table within reach. Will continue to monitor throughout this shift. 1445: continue with plan of care.

## 2019-12-16 NOTE — PROGRESS NOTES
0800: Perfect serve sent to Dr. ARAYA [facility] [employer] asking him to review and reorder home medications.

## 2019-12-16 NOTE — PROGRESS NOTES
[facility] AT [place] Respiratory Therapy Evaluation   Current Order:  Albuterol tid and q2 prn      Home Regimen: prn      Ordering Physician: Sabas Cabrales  Re-evaluation Date:  12/19     Diagnosis: copd      Patient Status: Stable / Unstable + Physician notified    The following MDI Criteria must be met in order to convert aerosol to MDI with spacer. If unable to meet, MDI will be converted to aerosol:  []  Patient able to demonstrate the ability to use MDI effectively  []  Patient alert and cooperative  []  Patient able to take deep breath with 5-10 second hold  []  Medication(s) available in this delivery method   []  Peak flow greater than or equal to 200 ml/min            Current Order Substituted To  (same drug, same frequency)   Aerosol to MDI [] Albuterol Sulfate 0.083% unit dose by aerosol Albuterol Sulfate MDI 2 puffs by inhalation with spacer    [] Levalbuterol 1.25 mg unit dose by aerosol Levalbuterol MDI 2 puffs by inhalation with spacer    [] Levalbuterol 0.63 mg unit dose by aerosol Levalbuterol MDI 2 puffs by inhalation with spacer    [] Ipratropium Bromide 0.02% unit dose by aerosol Ipratropium Bromide MDI 2 puffs by inhalation with spacer    [] Duoneb (Ipratropium + Albuterol) unit dose by aerosol Ipratropium MDI + Albuterol MDI 2 puffs by inhalation w/spacer   MDI to Aerosol [] Albuterol Sulfate MDI Albuterol Sulfate 0.083% unit dose by aerosol    [] Levalbuterol MDI 2 puffs by inhalation Levalbuterol 1.25 mg unit dose by aerosol    [] Ipratropium Bromide MDI by inhalation Ipratropium Bromide 0.02% unit dose by aerosol    [] Combivent (Ipratropium + Albuterol) MDI by inhalation Duoneb (Ipratropium + Albuterol) unit dose by aerosol   Treatment Assessment [Frequency/Schedule]:  Change frequency to: no change __________________________________________________per Protocol, P&T, MEC      Points 0 1 2 3 4   Pulmonary Status  Non-Smoker  []   Smoking history   < 20 pack years  []   Smoking history  ?  20 pack years  []

## 2019-12-16 NOTE — PROGRESS NOTES
2.2 2.9 1.2     ASSESSMENT :  68year old female patient with acute gastrointestinal hemorrhage. She underwent an upper endoscopy on 12/15/19, findings of Multiple small areas of ooze in the pyloric channel, duodenal bulb, no obvious ulcer or mass lesion. Her Hb did decline overnight, from 9.0 to 8. 2. however, no overt signs of active GI bleeding at this time. INR this morning is 1.2. Patient with COPD and requires oxygen at all time or she desaturates. PLAN:  - Continue course of care  - Monitor H/H  - Continue PPI  - Hold anticoagulation     Thank you for allowing me to participate in the care of your patient.   Feel free to contact me with any questions or concerns    Bk Escoto MD

## 2019-12-17 NOTE — PROGRESS NOTES
0501: Shift handoff complete. Pt in bed with eyes closed. No complaints or needs voiced at this time. Bed in lowest position with wheels locked and alarm on. Bedside table and call light within reach. Will continue to monitor throughout this shift. 3145: Pts family at bedside. 5426: Shift assessment complete. Pt continues to have moist cough, SpO2 drops into the 60's when pt coughs or has O2 mask off for any period of time. 1033: Dr. Sudheer Rhoades via perfect serve asking if pt should have ABGs drawn due to her SpO2 dropping into the 60s when off the venimask. 1540: Physical Therapy got pt up in chair with alarm on.

## 2019-12-17 NOTE — PROGRESS NOTES
[facility] Occupational Therapy      Date: 2019  Patient Name: Geovany Henry        MRN: [id number]  Account: [de-identified]   : 1950  (68 y.o.)  [facility]    Chart reviewed, attempted OT at 11:14pm for OT eval . Patient not seen 2° to:    [x] Hold per nsg request RN confirming if eval ok due to low hemoglobin    [] Pt declined     [] Pt. off floor for test/procedure. Spoke to Daron Smith RN aware. Will attempt again when able.     Electronically signed by Garrick Funes OT on 2019 at 11:14 AM

## 2019-12-17 NOTE — PROGRESS NOTES
BUN 37* 31*   CREATININE 2.06* 2.10*   GLUCOSE 80 82   CALCIUM 8.1* 7.6*   PROT 6.2* 5.4*   LABALBU 2.8* 2.2*   BILITOT <0.2 <0.2   ALKPHOS 81 62   AST 30 16   ALT 60* 40*   LABGLOM 23.7* 23.2*   GFRAA 28.6* 28.0*   GLOB 3.4 3.2       MV Settings:     FiO2 : 50 %    No results for input(s): PHART, PJO6BKA, PO2ART, IPJ8IIP, BEART, Q8XGKGDR in the last 72 hours. O2 Device: Air entrainment mask  O2 Flow Rate (L/min): 12 L/min    Dietary Nutrition Supplements: Standard High Calorie Oral Supplement  DIET GENERAL; No Added Salt (3-4 GM)     MEDICATIONS during current hospitalization:    Continuous Infusions:   sodium chloride 75 mL/hr at 12/17/19 0510       Scheduled Meds:   pantoprazole  40 mg Intravenous BID    And    sodium chloride (PF)  10 mL Intravenous BID    acetylcysteine  600 mg Inhalation BID    amLODIPine  10 mg Oral Daily    atenolol  50 mg Oral BID    atorvastatin  40 mg Oral Nightly    guaiFENesin  600 mg Oral BID    isosorbide mononitrate  60 mg Oral Daily    budesonide  0.5 mg Nebulization BID    albuterol  2.5 mg Nebulization TID    sodium chloride flush  10 mL Intravenous 2 times per day    sodium chloride (PF)  10 mL Intravenous Once    piperacillin-tazobactam  2.25 g Intravenous Q8H    sodium chloride  250 mL Intravenous Once    levetiracetam  500 mg Intravenous Q12H       PRN Meds:potassium chloride **OR** potassium alternative oral replacement **OR** potassium chloride, magnesium sulfate, hydrALAZINE, sodium chloride flush, magnesium hydroxide, ondansetron, acetaminophen, albuterol    Radiology  Ct Abdomen Pelvis Wo Contrast Additional Contrast? Oral    Result Date: 12/1/2019  CT ABDOMEN PELVIS WO CONTRAST: 12/1/2019 CLINICAL HISTORY:  Diffuse abd tenderness . COMPARISON: None available. TECHNIQUE: Spiral images were obtained of the abdomen and pelvis without intravenous contrast. Oral contrast was used for bowel opacification.  All CT scans at this facility use dose modulation, the lung bases, greater on the left appears unchanged. Hyperinflation and coarsening of the bronchovascular structures consistent with COPD appears otherwise unchanged. There is no cardiomegaly, vascular congestion, pneumothorax, or displaced fractures identified. STABLE CHEST COMPARED TO 12/7/2019. SMALL PLEURAL EFFUSIONS AND BIBASILAR INFILTRATE/CONSOLIDATION, GREATER ON THE LEFT. COPD. Xr Chest Standard (2 Vw)    Result Date: 12/7/2019  EXAMINATION: XR CHEST (2 VW) CLINICAL HISTORY: PNEUMONIA COMPARISONS: DECEMBER 5, 2019, DECEMBER 1, 2019 FINDINGS: Osseous structures intact. Cardiopericardial silhouette normal. Aorta calcified and tortuous. Right lung clear and hyperexpanded. Blunting left costophrenic angle, with an area of increased opacity left lower lobe obscuring left lower lung, diaphragm, left cardiac silhouette, again identified, and unchanged. NO INTERVAL CHANGE, LEFT PLEURAL EFFUSION, AND LEFT LOWER LOBE PNEUMONIA. EMPHYSEMA. Xr Chest Standard (2 Vw)    Result Date: 12/5/2019  XR CHEST (2 VW) : 12/5/2019 CLINICAL HISTORY:  Pneumonia versus atelectasis . COMPARISON: 12/1/2019. TECHNIQUE: ROUTINE. FINDINGS: Since the prior study, endotracheal, orogastric tubes and a right internal jugular approach hemodialysis catheter have been removed. There is increasing infiltrate/consolidation of the left lung base suspicious for bronchopneumonia, with a small pleural effusion. A small right pleural effusion is present, with minimal probable left basilar atelectasis. Hyperinflation and coarsening of the bronchovascular structures consistent with COPD appears otherwise unchanged. There is no pneumothorax, cardiomegaly, vascular congestion, or other significant changes identified. WORSENING MODERATE INFILTRATE/CONSOLIDATION OF THE LEFT LUNG BASE SUSPICIOUS FOR BRONCHOPNEUMONIA. SMALL LEFT AND VERY SMALL RIGHT PLEURAL EFFUSIONS. COPD.      Ct Head Wo Contrast    Result Date: 11/29/2019  EXAMINATION: CT HEAD WO CONTRAST HISTORY:   seizure   altered mental status. TECHNIQUE:  Serial axial images without IV contrast were obtained from the vertex to the foramen magnum. Sagittal and coronal reconstructions. All CT scans at this facility use dose modulation, iterative reconstruction, and/or weight based dosing when appropriate to reduce radiation dose to as low as reasonably achievable. COMPARISON:  4/20/2014 RESULT: Post-operative change:  None. Acute change:   No evidence of an acute infarct or other acute parenchymal process. Hemorrhage:    No evidence of acute intracranial hemorrhage. Mass Lesion / Mass Effect:   There is no evidence of an intracranial mass or extraaxial fluid collection. No significant mass effect. Chronic change:   Scattered patchy foci of low attenuation are present within supratentorial white matter which is a nonspecific finding but likely represents mild to moderate microvascular ischemia. Vascular calcifications. Parenchyma: There is no significant volume loss. The brain parenchyma is otherwise within normal limits for age. Ventricles: The ventricles are within normal limits of size and configuration for age. Paranasal sinuses and skull base:  Mucosal thickening ethmoid air cells. Mastoid air cells are clear. The skull base is unremarkable. Soft tissues unremarkable. No acute intracranial process. Chronic microvascular ischemic changes, appear worsened from 4/20/2014. Xr Chest Portable    Result Date: 12/16/2019  EXAMINATION: XR CHEST PORTABLE DATE AND TIME:12/16/2019 3:00 PM CLINICAL HISTORY: Shortness of breath   sob  COMPARISONS: December 13, 2019  FINDINGS: Left effusion with compressive atelectasis. Fusion may be partially loculated. New infiltrate throughout the right lung most extensive in the right upper lung zone. Possibility of a slightly atypical interstitial edema presentation is not excluded. Clinical correlation suggested.      INCREASED INTERSTITIAL Portable    Result Date: 2019  Patient MRN: [id number] : 1950 Age:  68 years Gender: Female Order Date: 2019 9:30 PM. Exam: XR CHEST PORTABLE Number of Views: 2 Indication:  Generalized weakness, seizures and rhonchi Comparison: 3/16/2018 Findings: Vascular calcifications thoracic aorta. Interval placement large-bore right-sided central line when compared with the previous study with its distal tip overlying the distal superior vena cava. Airspace disease left lung base. Impression:  1. Findings suspicious for possible left basilar infiltrate/pneumonia and partial atelectasis/scarring. Xr Chest Portable    Result Date: 2019  EXAMINATION: XR CHEST PORTABLE CLINICAL HISTORY: INTUBATION COMPARISONS: 2019, 2140 HOURS, 2018 FINDINGS: Endotracheal tube, 4.7 cm superior to max. Dual-lumen right jugular vein central line with tip in right atrium unchanged. Osseous structures. Cardiopericardial silhouette normal. Lungs hyperexpanded. Ill-defined area of increased opacity left lung base. LEFT LOWER LUNG ATELECTASIS/PNEUMONIA. EMPHYSEMA. Mri Brain Wo Contrast    Result Date: 2019  Patient MRN: [id number] : 1950 Age:  68 years Gender: Female Order Date: 2019 10:45 AM. Exam: MRI BRAIN WO CONTRAST Number of Views: 090 Indication:  Stroke. Comparison: Head CT without contrast 2019. Contrast dosage: None Findings: There is no evidence of restricted diffusion or acute/subacute cerebrovascular infarction/accident. Pituitary gland and sellar/suprasellar regions are unremarkable. No Chiari malformation. . Hippocampal formations are symmetric in signal intensity and morphology bilaterally. Susceptibility weighted imaging demonstrates no evidence of intraparenchymal hemorrhage. No intrinsic noncontrast T1 shortening. Moderate cerebral volume loss/atrophy. No subfalcine, transtentorial or tonsillar herniation or shift.  Normal expected appearance of the visualized T2 flow voids. Thinning of the optic lenses bilaterally, otherwise, optic globes and orbital contents are unremarkable. Large bilateral mastoid air cell effusions. Paranasal sinuses well aerated. There are extensive T2 FLAIR abnormal hyperintensities noted bilateral centrum ovale, periventricular regions, extending in the subcortical and deep white matter of the bilateral frontal, parietal, and occipital lobes. Remote lacunar infarctions bilateral cerebellum. Prominence enlarged spaces bilateral basal ganglia. 1. No evidence of restricted diffusion or acute/subacute cerebrovascular infarction/accident. 2. Moderate cerebral volume loss/atrophy with extensive white matter changes suggestive of sequelae moderately severe small vessel ischemic disease. 3. Large bilateral mastoid air cell effusions. 4. Remote lacunar infarctions bilateral cerebellum. Us Dup Lower Extremities Bilateral Venous    Result Date: 11/29/2019  VENOUS DUPLEX ULTRASOUND OF THE BILATERAL LOWER EXTREMITY CLINICAL HISTORY:  BILATERAL LEG PAIN, PATIENT WITH DYSPNEA AND ELEVATED D-DIMER, EVALUATE FOR THROMBOPHLEBITIS OR DEEP VENOUS THROMBOSIS IN BOTH LOWER EXTREMITIES COMPARISON:  NONE AVAILABLE TECHNIQUE:  Sonographic imaging of the deep venous system of the bilateral  lower extremity was performed by a registered sonographer and the images were submitted for interpretation. FINDINGS:  The common femoral, superficial femoral, and popliteal veins demonstrate normal color flow, augmentation, and compressibility. No venous duplex ultrasound evidence of DVT in the bilateral lower extremity. There is suboptimal visualization of the left calf veins. The visualized portions of the right calf veins appear patent. NO SONOGRAPHIC EVIDENCE OF DVT WITHIN THE  BILATERAL  LOWER EXTREMITY. IMPRESSION AND SUGGESTION:  1. Pseudomonas pneumonia, worsening right lung infiltration  2.   Acute on chronic respiratory failure on 50% Ventimask  3. COPD with mild exacerbation  4. Acute blood loss anemia and GI bleed  5. Chronic kidney disease    Continue O2 via Ventimask. oCrey Navarrete Keep saturation 90% or above. Continue bronchodilator therapy with albuterol 3 times daily and every 2 hours PRN. She has cough but not able to bring mucus out. Refusing to use Acapella. Aspiration precaution. will follow.       Electronically signed by Zach Quezada MD, FCCP on 12/17/2019 at 4:54 PM

## 2019-12-17 NOTE — CONSULTS
accessory muscle use  Lung : Diminished BS bilateraly. Right basilar Rales. No wheezing. No rhonchi. Heart[de-identified] Normal  rate. Regular rhythm. No mumur ,  Rub or gallop  ABD: Non-tender. Non-distended. No masses. No organmegaly. Normal bowel sounds. No hernia. Extremities: No pitting in both lower leg , No Cyanosis ,No clubbing  Neuro: But arousable, no focal weakness   Skin: Warm and dry. No erythema rash on exposed extremities. Data Review  Recent Labs     12/14/19  0527  12/15/19  0526  12/16/19  0551 12/16/19  1218 12/16/19  1706   WBC 4.2*  --  6.2  --  7.4  --   --    HGB 7.8*   < > 8.1*   < > 8.2* 7.6* 7.7*   HCT 24.2*   < > 25.4*   < > 25.2* 23.9* 24.4*     --  291  --  281  --   --     < > = values in this interval not displayed. Recent Labs     12/14/19  0527 12/15/19  0526 12/16/19  0551   * 146* 142   K 4.6 4.0 3.2*    113* 107   CO2 23 21 21   BUN 57* 49* 37*   CREATININE 2.41* 2.25* 2.06*   GLUCOSE 139* 77 80       MV Settings: ABGs: No results for input(s): PHART, GYV9WUN, PO2ART, BJL9PSH, BEART, W6VCYAOC, JCZ4XBG in the last 72 hours. O2 Device: Air entrainment mask  O2 Flow Rate (L/min): 15 L/min  Lab Results   Component Value Date    LACTA 1.0 12/13/2019    LACTA 1.4 11/29/2019    LACTA 4.4 11/28/2019       Radiology  Ct Abdomen Pelvis Wo Contrast Additional Contrast? Oral    Result Date: 12/1/2019  CT ABDOMEN PELVIS WO CONTRAST: 12/1/2019 CLINICAL HISTORY:  Diffuse abd tenderness . COMPARISON: None available. TECHNIQUE: Spiral images were obtained of the abdomen and pelvis without intravenous contrast. Oral contrast was used for bowel opacification. All CT scans at this facility use dose modulation, iterative reconstruction, and/or weight based dosing when appropriate to reduce radiation dose to as low as reasonably achievable. FINDINGS: An enteric tube is noted with its tip in the fundus of the otherwise normal-appearing stomach.  A Bishop catheter identified. STABLE CHEST COMPARED TO 12/7/2019. SMALL PLEURAL EFFUSIONS AND BIBASILAR INFILTRATE/CONSOLIDATION, GREATER ON THE LEFT. COPD. Xr Chest Standard (2 Vw)    Result Date: 12/7/2019  EXAMINATION: XR CHEST (2 VW) CLINICAL HISTORY: PNEUMONIA COMPARISONS: DECEMBER 5, 2019, DECEMBER 1, 2019 FINDINGS: Osseous structures intact. Cardiopericardial silhouette normal. Aorta calcified and tortuous. Right lung clear and hyperexpanded. Blunting left costophrenic angle, with an area of increased opacity left lower lobe obscuring left lower lung, diaphragm, left cardiac silhouette, again identified, and unchanged. NO INTERVAL CHANGE, LEFT PLEURAL EFFUSION, AND LEFT LOWER LOBE PNEUMONIA. EMPHYSEMA. Xr Chest Standard (2 Vw)    Result Date: 12/5/2019  XR CHEST (2 VW) : 12/5/2019 CLINICAL HISTORY:  Pneumonia versus atelectasis . COMPARISON: 12/1/2019. TECHNIQUE: ROUTINE. FINDINGS: Since the prior study, endotracheal, orogastric tubes and a right internal jugular approach hemodialysis catheter have been removed. There is increasing infiltrate/consolidation of the left lung base suspicious for bronchopneumonia, with a small pleural effusion. A small right pleural effusion is present, with minimal probable left basilar atelectasis. Hyperinflation and coarsening of the bronchovascular structures consistent with COPD appears otherwise unchanged. There is no pneumothorax, cardiomegaly, vascular congestion, or other significant changes identified. WORSENING MODERATE INFILTRATE/CONSOLIDATION OF THE LEFT LUNG BASE SUSPICIOUS FOR BRONCHOPNEUMONIA. SMALL LEFT AND VERY SMALL RIGHT PLEURAL EFFUSIONS. COPD. Ct Head Wo Contrast    Result Date: 11/29/2019  EXAMINATION:  CT HEAD WO CONTRAST HISTORY:   seizure   altered mental status. TECHNIQUE:  Serial axial images without IV contrast were obtained from the vertex to the foramen magnum. Sagittal and coronal reconstructions.  All CT scans at this facility use dose 3/16/2018 Findings: Vascular calcifications thoracic aorta. Interval placement large-bore right-sided central line when compared with the previous study with its distal tip overlying the distal superior vena cava. Airspace disease left lung base. Impression:  1. Findings suspicious for possible left basilar infiltrate/pneumonia and partial atelectasis/scarring. Xr Chest Portable    Result Date: 2019  EXAMINATION: XR CHEST PORTABLE CLINICAL HISTORY: INTUBATION COMPARISONS: 2019, 2140 HOURS, 2018 FINDINGS: Endotracheal tube, 4.7 cm superior to max. Dual-lumen right jugular vein central line with tip in right atrium unchanged. Osseous structures. Cardiopericardial silhouette normal. Lungs hyperexpanded. Ill-defined area of increased opacity left lung base. LEFT LOWER LUNG ATELECTASIS/PNEUMONIA. EMPHYSEMA. Mri Brain Wo Contrast    Result Date: 2019  Patient MRN: [id number] : 1950 Age:  68 years Gender: Female Order Date: 2019 10:45 AM. Exam: MRI BRAIN WO CONTRAST Number of Views: 586 Indication:  Stroke. Comparison: Head CT without contrast 2019. Contrast dosage: None Findings: There is no evidence of restricted diffusion or acute/subacute cerebrovascular infarction/accident. Pituitary gland and sellar/suprasellar regions are unremarkable. No Chiari malformation. . Hippocampal formations are symmetric in signal intensity and morphology bilaterally. Susceptibility weighted imaging demonstrates no evidence of intraparenchymal hemorrhage. No intrinsic noncontrast T1 shortening. Moderate cerebral volume loss/atrophy. No subfalcine, transtentorial or tonsillar herniation or shift. Normal expected appearance of the visualized T2 flow voids. Thinning of the optic lenses bilaterally, otherwise, optic globes and orbital contents are unremarkable. Large bilateral mastoid air cell effusions. Paranasal sinuses well aerated.  There are extensive T2 FLAIR abnormal hyperintensities noted bilateral centrum ovale, periventricular regions, extending in the subcortical and deep white matter of the bilateral frontal, parietal, and occipital lobes. Remote lacunar infarctions bilateral cerebellum. Prominence enlarged spaces bilateral basal ganglia. 1. No evidence of restricted diffusion or acute/subacute cerebrovascular infarction/accident. 2. Moderate cerebral volume loss/atrophy with extensive white matter changes suggestive of sequelae moderately severe small vessel ischemic disease. 3. Large bilateral mastoid air cell effusions. 4. Remote lacunar infarctions bilateral cerebellum. Us Dup Lower Extremities Bilateral Venous    Result Date: 11/29/2019  VENOUS DUPLEX ULTRASOUND OF THE BILATERAL LOWER EXTREMITY CLINICAL HISTORY:  BILATERAL LEG PAIN, PATIENT WITH DYSPNEA AND ELEVATED D-DIMER, EVALUATE FOR THROMBOPHLEBITIS OR DEEP VENOUS THROMBOSIS IN BOTH LOWER EXTREMITIES COMPARISON:  NONE AVAILABLE TECHNIQUE:  Sonographic imaging of the deep venous system of the bilateral  lower extremity was performed by a registered sonographer and the images were submitted for interpretation. FINDINGS:  The common femoral, superficial femoral, and popliteal veins demonstrate normal color flow, augmentation, and compressibility. No venous duplex ultrasound evidence of DVT in the bilateral lower extremity. There is suboptimal visualization of the left calf veins. The visualized portions of the right calf veins appear patent. NO SONOGRAPHIC EVIDENCE OF DVT WITHIN THE  BILATERAL  LOWER EXTREMITY. Assessment and  plan:     1. Pseudomonas pneumonia, worsening right lung infiltration  2. Acute on chronic respiratory failure on 50% Ventimask  3. COPD with mild exacerbation  4. Acute blood loss anemia and GI bleed  5.   Chronic kidney disease    Patient is currently on 50% Ventimask her O2 saturation is 96% chest x-ray shows increased interstitial opacity throughout the right lung most striking the upper lung. He is on IV Zosyn 2.25 g every 8 hourly. Continue bronchodilator therapy with albuterol 3 times daily and every 2 hours PRN. She she has GI bleed with anemia received 1 unit PRBC and 2 units FFP. GI consulted. Titrate FiO2 down to keep saturation 90% or above. Aspiration precaution.   Will follow    Thank you for consult    Electronically signed by Kayli Small MD, FCCP on 12/16/2019 at 9:25 PM

## 2019-12-17 NOTE — PROGRESS NOTES
underwent an upper endoscopy on 12/15/19, findings of Multiple small areas of ooze in the pyloric channel, duodenal bulb, no obvious ulcer or mass lesion. No overt signs of active GI bleed. She is hemodynamically stable. Slight decline in Hb. PLAN:  - Continue course of care  - PPI twice daily  - Monitor H/H    Thank you for allowing me to participate in the care of your patient.   Feel free to contact me with any questions or concerns    Petra Sarmiento MD

## 2019-12-17 NOTE — PROGRESS NOTES
hypercapnic respiratory failure, COPD exacerbation, Pseudomonas pneumonia, sent to SNF on IV Zosyn x 2 weeks and today presented with:     # Acute blood loss anemia and GI bleed  - noted to have black,tarry stools at the NH  - Hb was 6.6 on arrival, FOBT was positive, INR-3.8,   - Vit K and FFPs administered,  - Hb is 8.0 s/p transfusion of 2 unit of PRBCs,   - EGD done 12/15 showed multiple small areas of ooze in the pyloric channel and duodenal bulb,   - continue Protonix BID  - GI following     # Pseudomonas PNA  - continue IV Zosyn, end date 12/24 per ID notes from last admission  - still requiring high O2  - repeat CXR with continued infiltrate  - respiratory to try percussion vest as patient with mucous, but unable to expectorate  - pulm consulted  - will try mucomyst     # COPD with chronic hypoxic respiratory failure  - continue duonebs, O2 therapy  - BiPAP at HS and PRN      # CKD 4  - stable, monitor renal function     # DVT  - warfarin on hold due to GI bleed  - resume when OK with GI     Disposition: Patient sp EGD 12/15 and still on PPI BID. GI following. Patient still requiring high O2. Pulm consulted for recs. Anticipated length of stay greater than 48 hours.     Ann Marie Inman DO  Internal Medicine          Electronically signed by Ann Marie Inman DO on 12/17/2019 at 3:09 PM

## 2019-12-18 NOTE — PROGRESS NOTES
Assessment completed. Denies pain. Currently on 50% venimask and spo2 96%. Rhonchi BL lungs. Generalized bruising. No concerns at this time. Will continue to monitor.

## 2019-12-18 NOTE — PROGRESS NOTES
not displayed. Recent Labs     12/17/19  0542 12/18/19  0533    138   K 4.2 3.9   * 109*   CO2 21 20   BUN 31* 30*   CREATININE 2.10* 2.20*   GLUCOSE 82 78   CALCIUM 7.6* 7.5*   PROT 5.4* 5.5*   LABALBU 2.2* 2.2*   BILITOT <0.2 <0.2   ALKPHOS 62 59   AST 16 15   ALT 40* 33   LABGLOM 23.2* 21.9*   GFRAA 28.0* 26.6*   GLOB 3.2 3.3       MV Settings:     FiO2 : 50 %    No results for input(s): PHART, JWD8ITB, PO2ART, HOQ8UKA, BEART, L9VCOXHF in the last 72 hours. O2 Device: Heated high flow cannula  O2 Flow Rate (L/min): 50 L/min    Dietary Nutrition Supplements: Standard High Calorie Oral Supplement  DIET GENERAL; No Added Salt (3-4 GM)     MEDICATIONS during current hospitalization:    Continuous Infusions:   sodium chloride 75 mL/hr at 12/18/19 1277       Scheduled Meds:   pantoprazole  40 mg Intravenous BID    And    sodium chloride (PF)  10 mL Intravenous BID    acetylcysteine  600 mg Inhalation BID    amLODIPine  10 mg Oral Daily    atenolol  50 mg Oral BID    atorvastatin  40 mg Oral Nightly    guaiFENesin  600 mg Oral BID    isosorbide mononitrate  60 mg Oral Daily    budesonide  0.5 mg Nebulization BID    albuterol  2.5 mg Nebulization TID    sodium chloride flush  10 mL Intravenous 2 times per day    sodium chloride (PF)  10 mL Intravenous Once    piperacillin-tazobactam  2.25 g Intravenous Q8H    sodium chloride  250 mL Intravenous Once    levetiracetam  500 mg Intravenous Q12H       PRN Meds:potassium chloride **OR** potassium alternative oral replacement **OR** potassium chloride, magnesium sulfate, hydrALAZINE, sodium chloride flush, magnesium hydroxide, ondansetron, acetaminophen, albuterol    Radiology  Ct Abdomen Pelvis Wo Contrast Additional Contrast? Oral    Result Date: 12/1/2019  CT ABDOMEN PELVIS WO CONTRAST: 12/1/2019 CLINICAL HISTORY:  Diffuse abd tenderness . COMPARISON: None available.  TECHNIQUE: Spiral images were obtained of the abdomen and pelvis without intravenous contrast. Oral contrast was used for bowel opacification. All CT scans at this facility use dose modulation, iterative reconstruction, and/or weight based dosing when appropriate to reduce radiation dose to as low as reasonably achievable. FINDINGS: An enteric tube is noted with its tip in the fundus of the otherwise normal-appearing stomach. A Cunningham catheter decompresses the urinary bladder. A rectal tube is noted in good position. The gallbladder is moderately distended with wall thickening, which may be acute cholecystitis or reactive from metabolic derangement. Very small calcified gallstones dependently are suggested. Fusiform aneurysmal dilatation of the infrarenal abdominal aorta is present to approximately 5.5 x 5.3 cm in caliber. There is no retroperitoneal hemorrhage, ascites, abnormal biliary or bowel dilatation. The aneurysm does not extend into the normal caliber common iliac arteries, which have moderate to marked atherosclerotic plaquing. Postoperative changes from previous vascular surgery of the right common femoral artery are otherwise unremarkable. Mild to moderate infiltrate/consolidation of the left lung base is noted, with a very small left pleural effusion. The visualized right lung base is clear. The unenhanced liver, spleen, pancreas, adrenal glands, kidneys, bowel loops, decompressed urinary bladder, and additional images of pelvis are unremarkable. MODERATELY DISTENDED GALLBLADDER WITH MODERATE WALL THICKENING, WHICH MAY BE ACUTE CHOLECYSTITIS OR REACTIVE. APPROXIMATELY 5.5 CM FUSIFORM INFRARENAL ABDOMINAL AORTIC ANEURYSM. MILD TO MODERATE ATELECTASIS/CONSOLIDATION OF THE LEFT LUNG BASE WITH A SMALL PLEURAL EFFUSION. BRONCHOPNEUMONIA SHOULD BE EXCLUDED CLINICALLY NASOGASTRIC, RECTAL TUBES AND A [place]AM CATHETER IN GOOD POSITION. Xr Chest Standard (2 Vw)    Result Date: 12/10/2019  XR CHEST (2 VW): 12/10/2019 CLINICAL HISTORY:  Pneumonia . COMPARISON: 12/7/2019.  Upright volume loss/atrophy. No subfalcine, transtentorial or tonsillar herniation or shift. Normal expected appearance of the visualized T2 flow voids. Thinning of the optic lenses bilaterally, otherwise, optic globes and orbital contents are unremarkable. Large bilateral mastoid air cell effusions. Paranasal sinuses well aerated. There are extensive T2 FLAIR abnormal hyperintensities noted bilateral centrum ovale, periventricular regions, extending in the subcortical and deep white matter of the bilateral frontal, parietal, and occipital lobes. Remote lacunar infarctions bilateral cerebellum. Prominence enlarged spaces bilateral basal ganglia. 1. No evidence of restricted diffusion or acute/subacute cerebrovascular infarction/accident. 2. Moderate cerebral volume loss/atrophy with extensive white matter changes suggestive of sequelae moderately severe small vessel ischemic disease. 3. Large bilateral mastoid air cell effusions. 4. Remote lacunar infarctions bilateral cerebellum. Us Dup Lower Extremities Bilateral Venous    Result Date: 11/29/2019  VENOUS DUPLEX ULTRASOUND OF THE BILATERAL LOWER EXTREMITY CLINICAL HISTORY:  BILATERAL LEG PAIN, PATIENT WITH DYSPNEA AND ELEVATED D-DIMER, EVALUATE FOR THROMBOPHLEBITIS OR DEEP VENOUS THROMBOSIS IN BOTH LOWER EXTREMITIES COMPARISON:  NONE AVAILABLE TECHNIQUE:  Sonographic imaging of the deep venous system of the bilateral  lower extremity was performed by a registered sonographer and the images were submitted for interpretation. FINDINGS:  The common femoral, superficial femoral, and popliteal veins demonstrate normal color flow, augmentation, and compressibility. No venous duplex ultrasound evidence of DVT in the bilateral lower extremity. There is suboptimal visualization of the left calf veins. The visualized portions of the right calf veins appear patent. NO SONOGRAPHIC EVIDENCE OF DVT WITHIN THE  BILATERAL  LOWER EXTREMITY. IMPRESSION AND SUGGESTION:  1.

## 2019-12-18 NOTE — PROGRESS NOTES
[facility] Occupational Therapy      Date: 2019  Patient Name: Vladimir Ceja        MRN: [id number]  Account: [de-identified]   : 1950  (68 y.o.)  Room: [place] [address]    Chart reviewed, attempted OT at 8:40am for OT eval. Patient not seen 2° to:    [x] Hold per nsg request--hold til afternoon due to low SPO2    [] Pt declined     [] Pt. off floor for test/procedure. Spoke to HENRY Quesada RN aware. Will attempt again when able.     Electronically signed by Dina Rodriguez OT on 2019 at 9:36 AM

## 2019-12-18 NOTE — PROGRESS NOTES
Hospitalist Progress Note      PCP: Eulogio Larson MD    Date of Admission: 12/13/2019    Chief Complaint:  Pt seen and examined. Pt with continued sob and mucous which she states she is unable to cough up. Pt refusing acapella.  On 12L @ 50% FiO2    Medications:  Reviewed    Infusion Medications    sodium chloride 75 mL/hr at 12/18/19 0734     Scheduled Medications    pantoprazole  40 mg Intravenous BID    And    sodium chloride (PF)  10 mL Intravenous BID    acetylcysteine  600 mg Inhalation BID    amLODIPine  10 mg Oral Daily    atenolol  50 mg Oral BID    atorvastatin  40 mg Oral Nightly    guaiFENesin  600 mg Oral BID    isosorbide mononitrate  60 mg Oral Daily    budesonide  0.5 mg Nebulization BID    albuterol  2.5 mg Nebulization TID    sodium chloride flush  10 mL Intravenous 2 times per day    sodium chloride (PF)  10 mL Intravenous Once    piperacillin-tazobactam  2.25 g Intravenous Q8H    sodium chloride  250 mL Intravenous Once    levetiracetam  500 mg Intravenous Q12H     PRN Meds: potassium chloride **OR** potassium alternative oral replacement **OR** potassium chloride, magnesium sulfate, hydrALAZINE, sodium chloride flush, magnesium hydroxide, ondansetron, acetaminophen, albuterol    No intake or output data in the 24 hours ending 12/18/19 1526    Exam:    BP (!) 147/69   Pulse 65   Temp 97.9 °F (36.6 °C) (Oral)   Resp 18   Ht 5' 5\" (1.651 m)   Wt 91 lb 14.9 oz (41.7 kg)   SpO2 94%   BMI 15.30 kg/m²     Constitutional: Awake and alert lying in bed comfortably  Head: Normocephalic, atraumatic  Eyes: EOMI, PERRLA  ENT: moist mucous membranes  Neck: neck supple, trachea midline  Lungs: Good inspiratory effort, bilateral crackles and rhonchi, mild wheeze  Heart: RRR, normal S1 and S2, no murmurs  GI: Soft, non-distended, non tender, no guarding, no rebound, +BS  MSK: no edema noted  Pulses: 2+ pulses bilaterally  Skin: warm, dry  Psych: appropriate affect         Labs:   Recent Labs being managed for acute hypoxic/ hypercapnic respiratory failure, COPD exacerbation, Pseudomonas pneumonia, sent to SNF on IV Zosyn x 2 weeks and today presented with:     # Acute blood loss anemia and GI bleed  - noted to have black,tarry stools at the NH  - Hb was 6.6 on arrival, FOBT was positive, INR-3.8,   - Vit K and FFPs administered,  - Hb is 8.0 s/p transfusion of 2 unit of PRBCs,   - EGD done 12/15 showed multiple small areas of ooze in the pyloric channel and duodenal bulb,   - continue Protonix BID  - GI following     # Pseudomonas PNA  - continue IV Zosyn, end date 12/24 per ID notes from last admission  - still requiring high O2  - repeat CXR with continued infiltrate  - respiratory to try percussion vest as patient with mucous, but unable to expectorate  - pulm consulted  - will try mucomyst     # COPD with chronic hypoxic respiratory failure  - continue duonebs, O2 therapy  - BiPAP at HS and PRN      # CKD 4  - stable, monitor renal function     # DVT  - warfarin on hold due to GI bleed  - resume when OK with GI     # Severe protein calorie malnutrition  - encourage po intake     Disposition: Patient sp EGD 12/15 and still on PPI BID. GI following. Patient still requiring high O2. Pulm consulted for recs. Anticipated length of stay greater than 48 hours.     Hali Jimenez DO  Internal Medicine          Electronically signed by Hali Jimenez DO on 12/18/2019 at 3:26 PM

## 2019-12-18 NOTE — DISCHARGE INSTR - COC
Continuity of Care Form    Patient Name: Jai Chávez   :  1950  MRN:  [id number]    Admit date:  2019  Discharge date:  ***    Code Status Order: Full Code   Advance Directives:     Admitting Physician:  Jose Shah MD  PCP: Tatianna Mariscal MD    Discharging Nurse: [facility] Unit/Room#: W123/P349-20  Discharging Unit Phone Number: ***    Emergency Contact:   Extended Emergency Contact Information  Primary Emergency Contact: [facility]  Address: [address] [place] [address], [address] [place] [address] Phone: [phone]  Work Phone: [phone]  Mobile Phone: [phone]  Relation: Domestic Partner    Past Surgical History:  Past Surgical History:   Procedure Laterality Date    CATHETER REMOVAL N/A 2019    REMOVAL OF DIALYSIS CATHETER performed by Alina Walton MD at [facility] 505      OH COLON CA SCRN NOT  [address] IND N/A 11/15/2017    COLONOSCOPY performed by Brandon Garcia MD at [facility] 61 ESOPHAGOGASTRODUODENOSCOPY TRANSORAL DIAGNOSTIC N/A 11/15/2017    EGD ESOPHAGOGASTRODUODENOSCOPY WITH SMALL BOWEL BIOPSY performed by Barndon Garcia MD at [place] [address] 12/15/2019    EGD ESOPHAGOGASTRODUODENOSCOPY performed by Pam Petersen MD at [address] [place] [address]      L leg DVT       Immunization History:   Immunization History   Administered Date(s) Administered    Influenza Virus Vaccine 10/01/2014, 10/06/2017    Influenza, Quadv, IM, PF (6 mo and older Fluzone, Flulaval, Fluarix, and 3 yrs and older Afluria) 2014, 10/17/2018, 2019    Pneumococcal Conjugate 13-valent (Glenetta Oh) 06/15/2018    Pneumococcal Polysaccharide ([id number]) 2019    Zoster Live (Zostavax) 2015       Active Problems:  Patient Active Problem List   Diagnosis Code    Hyperthyroidism E05.90    Asthma J45.909    Hypercholesteremia E78.00    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    Skin ulcer of left great toe, limited to breakdown of skin ([facility]) L97.521    Peripheral vascular disease ([facility]) I73.9    Paronychia of great toe, left L03.032    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 ([facility] [address] L97.522    Acute on chronic respiratory failure with hypoxia and hypercapnia ([facility]) J96.21, J96.22    Severe malnutrition ([facility]) E43    Seizure ([facility]) R56.9    Encephalopathy G93.40    COPD exacerbation ([facility]) J44.1    Pneumonia of left lower lobe due to infectious organism ([facility]) J18.1    Acute cholecystitis K81.0    Community acquired pneumonia J18.9    Mastoiditis of both sides H70.93    Sepsis due to pneumonia ([facility]) J18.9, A41.9    Pneumonia of both lower lobes due to Pseudomonas species ([facility]) J15.1    GI bleed K92.2    Acute blood loss anemia D62    Elevated INR R79.1       Isolation/Infection:   Isolation          No Isolation        Patient Infection Status     None to display          Nurse Assessment:  Last Vital Signs: BP (!) 147/69   Pulse 65   Temp 97.9 °F (36.6 °C) (Oral)   Resp 18   Ht 5' 5\" (1.651 m)   Wt 91 lb 14.9 oz (41.7 kg)   SpO2 94%   BMI 15.30 kg/m²     Last documented pain score (0-10 scale): Pain Level: 0  Last Weight:   Wt Readings from Last 1 Encounters:   19 91 lb 14.9 oz (41.7 kg)     Mental Status:  {IP PT MENTAL STATUS:}    IV Access:  { AG IV ACCESS:134168464}    Nursing Mobility/ADLs:  Walking   {P DME VOZQ:849306578}  Transfer  {P DME UXZJ:709349925}  Bathing  {P DME FUM}  Dressing  {P DME BEYX:666764986}  Toileting  {P DME MEJC:331356052}  Feeding  {P DME WSGS:359090686}  Med Admin  {P DME CXAA:324866332}  Med Delivery   {[facility] MED Delivery:271495979}    Wound Care Documentation and Therapy:        Elimination:  Continence:   · Bowel: {YES / IL:73815}  · Bladder: {YES / CX:41878}  Urinary Catheter: {Urinary Catheter:019037144}   Colostomy/Ileostomy/Ileal Conduit: {YES / RZ:68382}       Date of Last BM: ***  No intake or output data in the 24 hours ending 19 1647  No intake/output data recorded.     Safety Concerns:     [address] Safety Concerns:512165711}    Impairments/Disabilities:      [address] Impairments/Disabilities:406570207}    Nutrition Therapy:  Current Nutrition Therapy:   [address] AG Diet List:378872790}    Routes of Feeding: {CHP DME Other Feedings:947264581}  Liquids: {Slp liquid thickness:20312}  Daily Fluid Restriction: {CHP DME Yes amt example:801279802}  Last Modified Barium Swallow with Video (Video Swallowing Test): {Done Not Done HNSW:272367953}    Treatments at the Time of Hospital Discharge:   Respiratory Treatments: ***  Oxygen Therapy:  {Therapy; copd oxygen:03561}  Ventilator:    { CC Vent OLYS:287111323}    Rehab Therapies: {THERAPEUTIC INTERVENTION:6955153109}  Weight Bearing Status/Restrictions: [address]  Weight Bearin}  Other Medical Equipment (for information only, NOT a DME order):  {EQUIPMENT:222213623}  Other Treatments: ***    Patient's personal belongings (please select all that are sent with patient):  {[facility] DME Belongings:810255639}    RN SIGNATURE:  {Esignature:798348142}    CASE MANAGEMENT/SOCIAL WORK SECTION    Inpatient Status Date: 2019    Readmission Risk Assessment Score:  Readmission Risk              Risk of Unplanned Readmission:        32           Discharging to Facility/ Agency   · Name: [facility]  · Address:  · Phone:  · Fax:    Dialysis Facility (if applicable)   · Name:  · Address:  · Dialysis Schedule:  · Phone:  · Fax:    / signature: Electronically signed by BALJINDER Dong on 19 at 4:47 PM    PHYSICIAN SECTION    Prognosis: {Prognosis:7342682629}    Condition at Discharge: [address] Muro Patient Condition:171519108}    Rehab Potential (if transferring to Rehab): {Prognosis:8375197779}    Recommended Labs or Other Treatments After Discharge: ***    Physician

## 2019-12-18 NOTE — PROGRESS NOTES
Physical Therapy Missed Treatment   Facility/Department: [facility] MED SURG B019/P473-16    NAME: Codi Walker    : 1950 (68 y.o.)  MRN: [id number]    Account: [de-identified]  Gender: female    Chart reviewed, attempted PT at 15:30. Patient unavailable 2° to:    [] Hold per nsg request    [] Pt declined     [] Nsg notified   [] Other notified    [] Pt. . off floor for test/procedure. [x] Pt. Unavailable pt on 95% hiflow O2. Attempting supine therex, with minimal exertion pt's SpO2 drops down <90% and becomes SOB. Treatment ended, pt unable to tolerate. Will attempt PT treatment again at earliest convenience.       Electronically signed by Savage Guzmán PTA on 19 at 3:41 PM

## 2019-12-18 NOTE — PROGRESS NOTES
5269 assumed care of patient,agree with previous assessment 50% ventimask, rhonchi throughout lung fields, bruising noted

## 2019-12-19 NOTE — PROGRESS NOTES
[facility] [place] Respiratory Therapy Evaluation   Current Order:  Albuterol tid     Home Regimen: prn    Ordering Physician:elvira  Re-evaluation Date:  12/22     Diagnosis: GI bleed    Patient Status: Stable / Unstable + Physician notified    The following MDI Criteria must be met in order to convert aerosol to MDI with spacer. If unable to meet, MDI will be converted to aerosol:  []  Patient able to demonstrate the ability to use MDI effectively  []  Patient alert and cooperative  []  Patient able to take deep breath with 5-10 second hold  []  Medication(s) available in this delivery method   []  Peak flow greater than or equal to 200 ml/min            Current Order Substituted To  (same drug, same frequency)   Aerosol to MDI [] Albuterol Sulfate 0.083% unit dose by aerosol Albuterol Sulfate MDI 2 puffs by inhalation with spacer    [] Levalbuterol 1.25 mg unit dose by aerosol Levalbuterol MDI 2 puffs by inhalation with spacer    [] Levalbuterol 0.63 mg unit dose by aerosol Levalbuterol MDI 2 puffs by inhalation with spacer    [] Ipratropium Bromide 0.02% unit dose by aerosol Ipratropium Bromide MDI 2 puffs by inhalation with spacer    [] Duoneb (Ipratropium + Albuterol) unit dose by aerosol Ipratropium MDI + Albuterol MDI 2 puffs by inhalation w/spacer   MDI to Aerosol [] Albuterol Sulfate MDI Albuterol Sulfate 0.083% unit dose by aerosol    [] Levalbuterol MDI 2 puffs by inhalation Levalbuterol 1.25 mg unit dose by aerosol    [] Ipratropium Bromide MDI by inhalation Ipratropium Bromide 0.02% unit dose by aerosol    [] Combivent (Ipratropium + Albuterol) MDI by inhalation Duoneb (Ipratropium + Albuterol) unit dose by aerosol   Treatment Assessment [Frequency/Schedule]:  Change frequency to: no change __________________________________________________per Protocol, P&T, MEC      Points 0 1 2 3 4   Pulmonary Status  Non-Smoker  []   Smoking history   < 20 pack years  []   Smoking history  ?  20 pack years  [] Pulmonary Disorder  (acute or chronic)  [x]   Severe or Chronic w/ Exacerbation  []     Surgical Status No [x]   Surgeries     General []   Surgery Lower []   Abdominal Thoracic or []   Upper Abdominal Thoracic with  PulmonaryDisorder  []     Chest X-ray Clear/Not  Ordered     []  Chronic Changes  Results Pending  []  Infiltrates, atelectasis, pleural effusion, or edema  [x]  Infiltrates in more than one lobe []  Infiltrate + Atelectasis, &/or pleural effusion  []    Respiratory Pattern Regular,  RR = 12-20 [x]  Increased,  RR = 21-25 []  EATON, irregular,  or RR = 26-30 []  Decreased FEV1  or RR = 31-35 []  Severe SOB, use  of accessory muscles, or RR ? 35  []    Mental Status Alert, oriented,  Cooperative [x]  Confused but Follows commands []  Lethargic or unable to follow commands []  Obtunded  []  Comatose  []    Breath Sounds Clear to  auscultation  []  Decreased unilaterally or  in bases only []  Decreased  bilaterally  [x]  Crackles or intermittent wheezes []  Wheezes []    Cough Strong, Spontan., & nonproductive [x]  Strong,  spontaneous, &  productive []  Weak,  Nonproductive []  Weak, productive or  with wheezes []  No spontaneous  cough or may require suctioning []    Level of Activity Ambulatory [x]  Ambulatory w/ Assist  []  Non-ambulatory []  Paraplegic []  Quadriplegic []    Total    Score:_7______     Triage Score:__4______      Tri       Triage:     1. (>20) Freq: Q3    2. (16-20) Freq: Q4   3. (11-15) Freq: QID & Albuterol Q2 PRN    4. (6-10) Freq: TID & Albuterol Q2 PRN    5. (0-5) Freq Q4prn

## 2019-12-19 NOTE — FLOWSHEET NOTE
2008 assessment completed. Pt AO x4. On high flow. Vitals stable. Lungs are rhoni throughout. Has a moist non productive cough. Sob with activity. bs present. bm 12/17. Non tender. All needs met. Call light within reach and fall precautions maintained.

## 2019-12-19 NOTE — PROGRESS NOTES
Hospitalist Progress Note      PCP: Nikos Guerrero MD    Date of Admission: 12/13/2019    Chief Complaint:  Pt seen and examined. Pt with continued sob and mucous which she states she is unable to cough up. Pt refusing acapella. Switched to heated high flow nasal cannula - still requiring high O2, but denies sob or symptoms. Feels well overall.     Medications:  Reviewed    Infusion Medications    sodium chloride 75 mL/hr at 12/19/19 1106     Scheduled Medications    pantoprazole  40 mg Intravenous BID    And    sodium chloride (PF)  10 mL Intravenous BID    acetylcysteine  600 mg Inhalation BID    amLODIPine  10 mg Oral Daily    atenolol  50 mg Oral BID    atorvastatin  40 mg Oral Nightly    guaiFENesin  600 mg Oral BID    isosorbide mononitrate  60 mg Oral Daily    budesonide  0.5 mg Nebulization BID    albuterol  2.5 mg Nebulization TID    sodium chloride flush  10 mL Intravenous 2 times per day    sodium chloride (PF)  10 mL Intravenous Once    piperacillin-tazobactam  2.25 g Intravenous Q8H    sodium chloride  250 mL Intravenous Once    levetiracetam  500 mg Intravenous Q12H     PRN Meds: potassium chloride **OR** potassium alternative oral replacement **OR** potassium chloride, magnesium sulfate, hydrALAZINE, sodium chloride flush, magnesium hydroxide, ondansetron, acetaminophen, albuterol      Intake/Output Summary (Last 24 hours) at 12/19/2019 1508  Last data filed at 12/19/2019 0523  Gross per 24 hour   Intake 2056.25 ml   Output --   Net 2056.25 ml       Exam:    BP (!) 144/83   Pulse 66   Temp 97.7 °F (36.5 °C) (Oral)   Resp 20   Ht 5' 5\" (1.651 m)   Wt 91 lb 14.9 oz (41.7 kg)   SpO2 91%   BMI 15.30 kg/m²     Constitutional: Awake and alert lying in bed comfortably  Head: Normocephalic, atraumatic  Eyes: EOMI, PERRLA  ENT: moist mucous membranes  Neck: neck supple, trachea midline  Lungs: Good inspiratory effort, bilateral crackles and rhonchi, mild wheeze  Heart: RRR, normal S1 and

## 2019-12-19 NOTE — PROGRESS NOTES
Physical Therapy Missed Treatment   Facility/Department: [facility] MED SURG Z120/D761-61    NAME: Yasmeen Storm    : 1950 (68 y.o.)  MRN: [id number]    Account: [de-identified]  Gender: female    Chart reviewed, attempted PT at 200. Patient unavailable 2° to:      [x] Pt. Unavailable: SPO2 desat to 85% on high flow with light bed mobility. NSG notified.       Electronically signed by Krista Aldrich PTA on 19 at 10:12 AM

## 2019-12-19 NOTE — PROGRESS NOTES
Ensure and 'drinks it when she feels like it'. · Wound Type: (Excoriation on buttocks and coccyx)  · Current Nutrition Therapies:  · Oral Diet Orders: No Added Salt (3-4gm)   · Oral Diet intake: %  · Oral Nutrition Supplement (ONS) Orders: Standard High Calorie Oral Supplement(tid)  · ONS intake: 51-75%  · Anthropometric Measures:  · Ht: 5' 5\" (165.1 cm)   · Current Body Wt: (UTD, bedscale weight appeared inacurate)  · Admission Body Wt: 91 lb (41.3 kg)(12/13 Bed scale)  · Usual Body Wt: 95 lb (43.1 kg)(11/12 at [facility])  · % Weight Change:  ,  4.2% (5 lbs.) x 1 month  · Ideal Body Wt: 125 lb (56.7 kg), % Ideal Body 73%  · BMI Classification: BMI <18.5 Underweight    Nutrition Interventions:   Continue current diet, Continue current ONS  Continued Inpatient Monitoring    Nutrition Evaluation:   · Evaluation: Progressing toward goals   · Goals: PO intake >75% of meals/ONS. Weight >91 lbs.     · Monitoring: Meal Intake, Supplement Intake, Weight, Pertinent Labs      Electronically signed by Watson Stout RD, LD on 12/19/19 at 2:07 PM

## 2019-12-19 NOTE — PROGRESS NOTES
INPATIENT PROGRESS NOTES    PATIENT NAME: Blake Dominique  MRN: [id number]  SERVICE DATE:  December 19, 2019   SERVICE TIME:  12:19 PM      PRIMARY SERVICE: Pulmonary Disease    CHIEF COMPLAIN: Shortness of breath, hypoxia      INTERVAL HPI: Patient seen and examined at bedside, Interval Notes, orders reviewed. Nursing notes noted    Patient required to go on high flow oxygen 50 L. Current O2 saturation is 92%. O2 saturation was not able to maintain about 90% with 50% Ventimask. She is coughing but not able to bring any mucus out. She is using vest and Acapella  Patient still continues to have shortness of breath. No hemoptysis. No fever or chills. No nausea vomiting diarrhea. OBJECTIVE    Body mass index is 15.3 kg/m². PHYSICAL EXAM:  Vitals:  BP (!) 144/83   Pulse 66   Temp 97.7 °F (36.5 °C) (Oral)   Resp 20   Ht 5' 5\" (1.651 m)   Wt 91 lb 14.9 oz (41.7 kg)   SpO2 92%   BMI 15.30 kg/m²   General:  Alert, awake . comfortable in bed, No distress. appears stated age and cooperative  Head: Atraumatic , Normocephalic   Eyes: PERRL. No sclera icterus. No conjunctival injection. No discharge   ENT: No nasal  discharge. Pharynx clear. lips, teeth, mucosa and gums are normal, tongue protrudes in the midline  Neck:  Trachea midline. No thyromegaly, no JVD, No cervical adenopathy. Chest : Bilaterally symmetrical ,Normal effort,  No accessory muscle use  Lung : . Fair BS bilateral, decreased BS at bases. Bibasilar crackles, no wheezing. Scattered rhonchi. No dullness on percussion. Heart[de-identified] Normal  rate. Regular rhythm. No mumur ,  Rub or gallop  ABD: Non-tender. Non-distended. No masses. No organmegaly. Normal bowel sounds. No hernia.   Ext : No Pitting both leg , No Cyanosis No clubbing  Neuro: no focal weakness          DATA:   Recent Labs     12/18/19  0533  12/19/19  0516 12/19/19  1140   WBC 5.1  --  5.8  --    HGB 7.3*   < > 7.3* 7.7*   HCT 23.0*   < > 23.2* 25.3*   MCV 93.6  --  94.2  --     obtained of the abdomen and pelvis without intravenous contrast. Oral contrast was used for bowel opacification. All CT scans at this facility use dose modulation, iterative reconstruction, and/or weight based dosing when appropriate to reduce radiation dose to as low as reasonably achievable. FINDINGS: An enteric tube is noted with its tip in the fundus of the otherwise normal-appearing stomach. A Cunningham catheter decompresses the urinary bladder. A rectal tube is noted in good position. The gallbladder is moderately distended with wall thickening, which may be acute cholecystitis or reactive from metabolic derangement. Very small calcified gallstones dependently are suggested. Fusiform aneurysmal dilatation of the infrarenal abdominal aorta is present to approximately 5.5 x 5.3 cm in caliber. There is no retroperitoneal hemorrhage, ascites, abnormal biliary or bowel dilatation. The aneurysm does not extend into the normal caliber common iliac arteries, which have moderate to marked atherosclerotic plaquing. Postoperative changes from previous vascular surgery of the right common femoral artery are otherwise unremarkable. Mild to moderate infiltrate/consolidation of the left lung base is noted, with a very small left pleural effusion. The visualized right lung base is clear. The unenhanced liver, spleen, pancreas, adrenal glands, kidneys, bowel loops, decompressed urinary bladder, and additional images of pelvis are unremarkable. MODERATELY DISTENDED GALLBLADDER WITH MODERATE WALL THICKENING, WHICH MAY BE ACUTE CHOLECYSTITIS OR REACTIVE. APPROXIMATELY 5.5 CM FUSIFORM INFRARENAL ABDOMINAL AORTIC ANEURYSM. MILD TO MODERATE ATELECTASIS/CONSOLIDATION OF THE LEFT LUNG BASE WITH A SMALL PLEURAL EFFUSION. BRONCHOPNEUMONIA SHOULD BE EXCLUDED CLINICALLY NASOGASTRIC, RECTAL TUBES AND A CUNNINGHAM CATHETER IN GOOD POSITION.      Xr Chest Standard (2 Vw)    Result Date: 12/10/2019  XR CHEST (2 VW): 12/10/2019 CLINICAL HISTORY: Pneumonia . COMPARISON: 12/7/2019. Upright AP and lateral radiographs of the chest were obtained. FINDINGS: Small pleural effusions and infiltrate/consolidation of the lung bases, greater on the left appears unchanged. Hyperinflation and coarsening of the bronchovascular structures consistent with COPD appears otherwise unchanged. There is no cardiomegaly, vascular congestion, pneumothorax, or displaced fractures identified. STABLE CHEST COMPARED TO 12/7/2019. SMALL PLEURAL EFFUSIONS AND BIBASILAR INFILTRATE/CONSOLIDATION, GREATER ON THE LEFT. COPD. Xr Chest Standard (2 Vw)    Result Date: 12/7/2019  EXAMINATION: XR CHEST (2 VW) CLINICAL HISTORY: PNEUMONIA COMPARISONS: DECEMBER 5, 2019, DECEMBER 1, 2019 FINDINGS: Osseous structures intact. Cardiopericardial silhouette normal. Aorta calcified and tortuous. Right lung clear and hyperexpanded. Blunting left costophrenic angle, with an area of increased opacity left lower lobe obscuring left lower lung, diaphragm, left cardiac silhouette, again identified, and unchanged. NO INTERVAL CHANGE, LEFT PLEURAL EFFUSION, AND LEFT LOWER LOBE PNEUMONIA. EMPHYSEMA. Xr Chest Standard (2 Vw)    Result Date: 12/5/2019  XR CHEST (2 VW) : 12/5/2019 CLINICAL HISTORY:  Pneumonia versus atelectasis . COMPARISON: 12/1/2019. TECHNIQUE: ROUTINE. FINDINGS: Since the prior study, endotracheal, orogastric tubes and a right internal jugular approach hemodialysis catheter have been removed. There is increasing infiltrate/consolidation of the left lung base suspicious for bronchopneumonia, with a small pleural effusion. A small right pleural effusion is present, with minimal probable left basilar atelectasis. Hyperinflation and coarsening of the bronchovascular structures consistent with COPD appears otherwise unchanged. There is no pneumothorax, cardiomegaly, vascular congestion, or other significant changes identified.      WORSENING MODERATE INFILTRATE/CONSOLIDATION OF THE LEFT LUNG BASE SUSPICIOUS FOR BRONCHOPNEUMONIA. SMALL LEFT AND VERY SMALL RIGHT PLEURAL EFFUSIONS. COPD. Ct Head Wo Contrast    Result Date: 11/29/2019  EXAMINATION:  CT HEAD WO CONTRAST HISTORY:   seizure   altered mental status. TECHNIQUE:  Serial axial images without IV contrast were obtained from the vertex to the foramen magnum. Sagittal and coronal reconstructions. All CT scans at this facility use dose modulation, iterative reconstruction, and/or weight based dosing when appropriate to reduce radiation dose to as low as reasonably achievable. COMPARISON:  4/20/2014 RESULT: Post-operative change:  None. Acute change:   No evidence of an acute infarct or other acute parenchymal process. Hemorrhage:    No evidence of acute intracranial hemorrhage. Mass Lesion / Mass Effect:   There is no evidence of an intracranial mass or extraaxial fluid collection. No significant mass effect. Chronic change:   Scattered patchy foci of low attenuation are present within supratentorial white matter which is a nonspecific finding but likely represents mild to moderate microvascular ischemia. Vascular calcifications. Parenchyma: There is no significant volume loss. The brain parenchyma is otherwise within normal limits for age. Ventricles: The ventricles are within normal limits of size and configuration for age. Paranasal sinuses and skull base:  Mucosal thickening ethmoid air cells. Mastoid air cells are clear. The skull base is unremarkable. Soft tissues unremarkable. No acute intracranial process. Chronic microvascular ischemic changes, appear worsened from 4/20/2014. Xr Chest Portable    Result Date: 12/16/2019  EXAMINATION: XR CHEST PORTABLE DATE AND TIME:12/16/2019 3:00 PM CLINICAL HISTORY: Shortness of breath   sob  COMPARISONS: December 13, 2019  FINDINGS: Left effusion with compressive atelectasis. Fusion may be partially loculated.  New infiltrate throughout the right lung most extensive in the right

## 2019-12-19 NOTE — PROGRESS NOTES
[facility] OCCUPATIONAL THERAPY EVALUATION - ACUTE     NAME: Maylin Cornejo  : 1950 (68 y.o.)  MRN: [id number]  CODE STATUS: Full Code  Room: [place] [address]    Date of Service: 2019    Patient Diagnosis(es): GI bleed [K92.2]   Chief Complaint   Patient presents with    Abnormal Lab     HGB 6.6 HCT 20.9     Patient Active Problem List    Diagnosis Date Noted    Severe malnutrition ([address] 2019    Acute blood loss anemia     Elevated INR     GI bleed 2019    Sepsis due to pneumonia ([address]     Pneumonia of both lower lobes due to Pseudomonas species ([address]     COPD exacerbation (HCC)     Pneumonia of left lower lobe due to infectious organism ([address]     Acute cholecystitis     Community acquired pneumonia     Mastoiditis of both sides     Seizure ([address]     Encephalopathy     Acute on chronic respiratory failure with hypoxia and hypercapnia ([address] 2019    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 ([address] 2017    Skin ulcer of left great toe, limited to breakdown of skin ([address] 2017    Peripheral vascular disease ([address] 2017    Paronychia of great toe, left 2017    Chronic embolism and thrombosis of vein 10/01/2016    Hyperthyroidism 2014    Asthma 2014    Hypercholesteremia 2014        Past Medical History:   Diagnosis Date    COPD (chronic obstructive pulmonary disease) ([address]     History of hemodialysis     Hx of blood clots     Hyperlipidemia     Hyperthyroidism      Past Surgical History:   Procedure Laterality Date    CATHETER REMOVAL N/A 2019    REMOVAL OF DIALYSIS CATHETER performed by Ana Cristina Desai MD at [facility]      OH COLON CA SCRN NOT  [address] IND N/A 11/15/2017    COLONOSCOPY performed by Ana Glass MD at [address] ESOPHAGOGASTRODUODENOSCOPY TRANSORAL DIAGNOSTIC N/A 11/15/2017    EGD ESOPHAGOGASTRODUODENOSCOPY WITH SMALL BOWEL BIOPSY performed by Kamari Sales MD at [facility] 12/15/2019    EGD ESOPHAGOGASTRODUODENOSCOPY performed by Shantel Campo MD at [address]      L leg DVT        Restrictions:Fall,O2        Safety Devices: Safety Devices  Safety Devices in place: Yes  Type of devices: All fall risk precautions in place    Subjective:\"I'm having trouble hearing since I got in here. \"       Pain Reassessment: 0/10          Prior Level of Function:  Social/Functional History  Lives With: Significant other  Type of Home: House  Home Layout: Two level  Home Access: Stairs to enter with rails  Entrance Stairs - Number of Steps: 4-5  Bathroom Shower/Tub: Tub/Shower unit  Bathroom Equipment: 3-in-1 commode  Home Equipment: Oxygen, Rolling walker, Cane  ADL Assistance: Independent  Homemaking Assistance: Independent  Ambulation Assistance: Independent  Transfer Assistance: Independent  Active : Yes  Additional Comments: Pt reports that she did not use any DME but it is available    OBJECTIVE:     Orientation Status:  Orientation  Overall Orientation Status: Within Functional Limits    Observation:  Observation/Palpation  Posture: Fair  Observation: mild forward lean noted    Cognition Status:  Cognition  Overall Cognitive Status: WFL    Perception Status:  Perception  Overall Perceptual Status: WFL    Sensation Status:  Sensation  Overall Sensation Status: WFL    Vision and Hearing Status:  Vision  Vision: Impaired  Vision Exceptions: Wears glasses for reading  Hearing  Hearing: Within functional limits     ROM:   LUE AROM (degrees)  LUE AROM : WFL  Left Hand AROM (degrees)  Left Hand AROM: WFL  RUE AROM (degrees)  RUE AROM : WFL  Right Hand AROM (degrees)  Right Hand AROM: WFL    Strength:  LUE Strength  Gross LUE Strength: WFL  L Hand General: 4-/5  RUE Strength  Gross RUE Strength: WFL  R Hand General: 4-/5    Coordination, Tone, Quality of Movement:    Tone RUE  RUE Tone: Normotonic  Tone LUE  LUE Tone: Normotonic  Coordination  Movements Are Fluid And Coordinated: Yes    Hand Dominance:  Hand Dominance  Hand Dominance: Right    ADL Status:  ADL  Feeding: Unable to assess(comment)  Grooming: Setup  UE Bathing: Minimal assistance  LE Bathing: Minimal assistance  UE Dressing: Setup  LE Dressing: Minimal assistance  Toileting: Unable to assess(comment)          Functional Mobility:CGA          Bed Mobility:Mod I       Seated and Standing Balance:  Balance  Sitting Balance: Supervision  Standing Balance: Stand by assistance    Functional Endurance:  Activity Tolerance  Activity Tolerance: Patient limited by fatigue  Activity Tolerance: poor+    D/C Recommendations:  OT D/C RECOMMENDATIONS  REQUIRES OT FOLLOW UP: Yes    Equipment Recommendations:       OT Education:        OT Follow Up:  OT D/C RECOMMENDATIONS  REQUIRES OT FOLLOW UP: Yes       Assessment/Discharge Disposition:     Performance deficits / Impairments: Decreased functional mobility , Decreased strength, Decreased endurance, Decreased ADL status, Decreased balance, Decreased high-level IADLs  Prognosis: Fair  Discharge Recommendations: Continue to assess pending progress  Decision Making: Medium Complexity  History: 3 complexities  Exam: 6 deficits  Assistance / Modification: Min A    Six Click Score    How much help for putting on and taking off regular lower body clothing?: A Little  How much help for Bathing?: A Little  How much help for Toileting?: None  How much help for putting on and taking off regular upper body clothing?: A Little  How much help for taking care of personal grooming?: None  How much help for eating meals?: None  AM-[facility] Inpatient Daily Activity Raw Score: 21  AM-PAC Inpatient ADL T-Scale Score : 44.27  ADL Inpatient CMS 0-100% Score: 32.79    Plan:  Plan  Times per week: 1-4x/wk  Current Treatment Recommendations: Strengthening, Functional Mobility Training, Endurance Training, Balance Training, Neuromuscular Re-education, Self-Care / ADL    Goals:   Patient will:    - Improve functional endurance to tolerate/complete 6-10 mins of ADL's  - Be Set up in UB ADLs   - Be Set up in LB ADLs  - Be Supervision in ADL transfers without LOB  - Be independent in toileting tasks  - Improve bilateral UE strength and endurance to Fair in order to participate in self-care activities as projected. - Access appropriate D/C site with as few architectural barriers as possible. Patient Goal: Patient goals :  To return to SNF then home with support      Discussed and agreed upon: Yes Comments:     Therapy Time:   OT Individual Minutes  Time In: 5417  Time Out: 0900  Minutes: 25      Electronically signed by:    [id number], OTR/L  39/19/3146, 9:08 AM Electronically signed by [id number], OTR/L on 77/65/62 at 9:10 AM

## 2019-12-20 NOTE — PROGRESS NOTES
Physical Therapy   Facility/Department: [facility] MED SURG IC07/IC07-01    Patient D/C'd from current physical therapy POC due to an acute change in this patient's medical status (transfered to ICU). New physical therapy Eval and Treat orders are required to resume PT when pt is stable and appropriate for out of bed activity. Sticky note written to physicians. [facility]) Physical Therapy Department    Electronically signed by Veronique Najera PT on 19 at 4:32 PM    Physical Therapy   Facility/Department: [facility] MED SURG IC11/IC11-    NAME: Faustina Dior    : 1950 (68 y.o.)  MRN: [id number]    Account: [de-identified]  Gender: female    Hold PT treatment on this date d/t patient had a change in medical status. Pt moved from 292 to ICU 11    Change in medical status discussed with supervising PT on this date. Please re-consult physical therapy when appropriate. Note written to attending hospitalist  via \"sticky note\" requesting updated Physical Therapy eval and treat orders when pt is appropriate for out of bed activity.       [address] [place] [address]) Physical Therapy Department      Electronically signed by Annie James PTA on 19 at 1:02 PM

## 2019-12-20 NOTE — PROGRESS NOTES
Pulmonary & Critical Care Medicine ICU Progress Note  Chief complaint : Acute hypoxic respiratory failure    Subjunctive/24 hour events :   Patient seen and examined during multidisciplinary rounds with RN, charge nurse, RT, pharmacy, dietitian, and social service. Patient transferred earlier today to ICU due to hypoxia, she is on high flow oxygen per notes there is increased sputum production, patient is awake and alert she denies shortness of breath, denies chest pain, she feels okay in general even denied coughing at this time, she has no abdominal pain, no nausea no vomiting, no recorded temperature since yesterday, no diarrhea. Social History     Tobacco Use    Smoking status: Current Every Day Smoker     Packs/day: 1.50     Years: 55.00     Pack years: 82.50     Types: Cigarettes    Smokeless tobacco: Never Used   Substance Use Topics    Alcohol use: No     History reviewed. No pertinent family history. No results for input(s): PHART, DAB3XUD, PO2ART in the last 72 hours. MV Settings:     / / /FiO2 : 100 %           IV:      Vitals:  /78   Pulse 65   Temp 97.8 °F (36.6 °C)   Resp 22   Ht 5' 5\" (1.651 m)   Wt 91 lb 14.9 oz (41.7 kg)   SpO2 (!) 85%   BMI 15.30 kg/m²    Tmax:      No intake or output data in the 24 hours ending 12/20/19 1522    EXAM:  General: alert, cooperative, no distress  Head: normocephalic, atraumatic  Eyes:No gross abnormalities. and PERRL  ENT:  MMM no lesions  Neck:  supple and no masses  Chest : Bilateral rales, few rhonchi's, no wheezing, nontender, tympanic  Heart[de-identified] Heart sounds are normal.  Regular rate and rhythm without murmur, gallop or rub. ABD:  symmetric, soft, non-tender  Musculoskeletal : no cyanosis, no clubbing and no edema  Neuro:  Grossly normal  Skin: No rashes or nodules noted.   Lymph node:  no cervical nodes  Urology: No Bishop   Psychiatric: appropriate    Medications:  Scheduled Meds:   miconazole   Topical BID    pantoprazole  40 mg LEUKOCYTESUR, UROBILINOGEN, BILIRUBINUR, BLOODU, GLUCOSEU, AMORPHOUS in the last 72 hours. Invalid input(s): KETONESU    Cultures:  Pseudomonas on sputum culture on 12/ 7  Films:  CXR reviewed by me and it showed congestion with bilateral effusion      Assessment: This is a critically ill patient at risk of deterioration / death , needing close ICU monitoring and intervention due to below noted problems     · Acute on chronic hypoxic respiratory failure  · Probably patient is volume overloaded no clear infiltrate on chest x-ray  · Chronic kidney disease patient essentially at her baseline  · COPD with acute exacerbation  · GI bleed with acute blood loss anemia    Recommendation  · Lasix 40 mg IV x1  · Strict I's and O's  · Check BNP   · Check procalcitonin  · Continue O2 target sat 88 to 90%  · Budesonide twice daily  · Prednisone 40 mg p.o. daily  · DuoNebs every 6 hours while awake  · Watch closely in ICU intubate if needed  · Chest x-ray tomorrow    Due to the immediate potential for life-threatening deterioration due to acute hypoxic respiratory failure I spent 41  minutes providing critical care.  This time is excluding time spent performing procedures.           Electronically signed by Manav Davis MD,  FCCP ,on 12/20/2019 at 3:22 PM

## 2019-12-20 NOTE — PROGRESS NOTES
Hospitalist Progress Note      PCP: Shaniqua Schultz MD    Date of Admission: 12/13/2019    Chief Complaint:  Pt seen and examined. Pt still on high oxygen and with mucous production which she is unable to expectorate. Decision made this AM to transfer to the ICU for closer monitoring given her increased O2 demand.     Medications:  Reviewed    Infusion Medications     Scheduled Medications    miconazole   Topical BID    pantoprazole  40 mg Intravenous BID    And    sodium chloride (PF)  10 mL Intravenous BID    acetylcysteine  600 mg Inhalation BID    amLODIPine  10 mg Oral Daily    atenolol  50 mg Oral BID    atorvastatin  40 mg Oral Nightly    guaiFENesin  600 mg Oral BID    isosorbide mononitrate  60 mg Oral Daily    budesonide  0.5 mg Nebulization BID    albuterol  2.5 mg Nebulization TID    sodium chloride flush  10 mL Intravenous 2 times per day    sodium chloride (PF)  10 mL Intravenous Once    piperacillin-tazobactam  2.25 g Intravenous Q8H    sodium chloride  250 mL Intravenous Once    levetiracetam  500 mg Intravenous Q12H     PRN Meds: potassium chloride **OR** potassium alternative oral replacement **OR** potassium chloride, magnesium sulfate, hydrALAZINE, sodium chloride flush, magnesium hydroxide, ondansetron, acetaminophen, albuterol    No intake or output data in the 24 hours ending 12/20/19 1354    Exam:    BP (!) 149/65   Pulse 56   Temp 97.8 °F (36.6 °C)   Resp 21   Ht 5' 5\" (1.651 m)   Wt 91 lb 14.9 oz (41.7 kg)   SpO2 100%   BMI 15.30 kg/m²     Constitutional: Awake and alert lying in bed comfortably  Head: Normocephalic, atraumatic  Eyes: EOMI, PERRLA  ENT: moist mucous membranes, high flow nasal cannula in place  Neck: neck supple, trachea midline  Lungs: Good inspiratory effort, bilateral crackles and rhonchi, mild wheeze  Heart: RRR, normal S1 and S2, no murmurs  GI: Soft, non-distended, non tender, no guarding, no rebound, +BS  MSK: no edema noted  Pulses: 2+ pulses CVAs, DVT on warfarin, who was just discharged from the hospital on 12/11 after being managed for acute hypoxic/ hypercapnic respiratory failure, COPD exacerbation, Pseudomonas pneumonia, sent to SNF on IV Zosyn x 2 weeks and today presented with:     # Acute blood loss anemia and GI bleed  - noted to have black,tarry stools at the NH  - Hb was 6.6 on arrival, FOBT was positive, INR-3.8,   - Vit K and FFPs administered,  - Hb is 8.0 s/p transfusion of 2 unit of PRBCs,   - EGD done 12/15 showed multiple small areas of ooze in the pyloric channel and duodenal bulb,   - continue Protonix BID  - GI following     # Pseudomonas PNA  - continue IV Zosyn, end date 12/24 per ID notes from last admission  - still requiring high O2- transferred to the ICU on 12/20 for closer monitoring  - repeat CXR with continued infiltrate  - respiratory to try percussion vest as patient with mucous, but unable to expectorate  - pulm consulted  - will try mucomyst     # COPD with chronic hypoxic respiratory failure  - continue duonebs, O2 therapy  - BiPAP at HS and PRN      # CKD 4  - stable, monitor renal function     # DVT  - warfarin on hold due to GI bleed  - resume when OK with GI     # Severe protein calorie malnutrition  - encourage po intake     Disposition: Patient sp EGD 12/15 and still on PPI BID. GI following. Patient still requiring high O2. Transferred to the ICU on 12/20. Anticipated length of stay greater than 48 hours.     Agustina Santana DO  Internal Medicine          Electronically signed by Agustina Santana DO on 12/20/2019 at 1:54 PM

## 2019-12-20 NOTE — PROGRESS NOTES
1222 patient arrived to the unit via bed. Pt placed on monitor, high flow in place, vss, will continue to monitor. Electronically signed by Cristin Garcia RN on 12/20/2019 at 1:03 PM  1230 assessment completed as noted.  Electronically signed by Cristin Garcia RN on 12/20/2019 at 1:03 PM

## 2019-12-20 NOTE — CARE COORDINATION
Patient transferred to icu. Cm to continue to follow for any new d/c needs or changes to the d/c plan of care.

## 2019-12-21 NOTE — PROCEDURES
Xavier Wilder is a 68 y.o. female patient. 1. COPD with acute exacerbation ([facility]    2. Hypoxia    3. Gastrointestinal hemorrhage associated with gastric ulcer      Past Medical History:   Diagnosis Date    COPD (chronic obstructive pulmonary disease) ([facility]     History of hemodialysis     Hx of blood clots     Hyperlipidemia     Hyperthyroidism      Blood pressure (!) 146/74, pulse 54, temperature 97.5 °F (36.4 °C), temperature source Axillary, resp. rate 16, height 5' 5\" (1.651 m), weight 100 lb 5 oz (45.5 kg), SpO2 100 %. Intubation  Date/Time: 12/21/2019 4:18 PM  Performed by: Malcom Jimenez MD  Authorized by: Malcom Jimenez MD   Consent: The procedure was performed in an emergent situation. Verbal consent obtained. Risks and benefits: risks, benefits and alternatives were discussed  Consent given by: patient  Indications: respiratory distress,  respiratory failure,  hypoxemia,  hypercapnia and  pulmonary toilet  Intubation method: direct  Patient status: sedated  Preoxygenation: BVM  Sedatives: etomidate  Laryngoscope size: Mac 4  Tube size: 7.5 mm  Tube type: cuffed  Number of attempts: 1  Ventilation between attempts: BVM  Cricoid pressure: no  Cords visualized: yes  Post-procedure assessment: chest rise and CO2 detector  Breath sounds: equal  Cuff inflated: yes  ETT to lip: 22 cm  Tube secured with: ETT suero  Chest x-ray interpreted by me.   Chest x-ray findings: endotracheal tube in appropriate position  Patient tolerance: Patient tolerated the procedure well with no immediate complications          Malcom Jimenez MD  12/21/2019

## 2019-12-21 NOTE — PROGRESS NOTES
Hospitalist Progress Note      PCP: Tom Huerta MD    Date of Admission: 12/13/2019    Chief Complaint:  Pt seen and examined. Decision made this AM to electively intubate patient. Patient agreeable.     Medications:  Reviewed    Infusion Medications     Scheduled Medications    propofol        succinylcholine chloride        chlorhexidine  15 mL Mouth/Throat BID    etomidate  10 mg Intravenous Once    miconazole   Topical BID    predniSONE  40 mg Oral Daily    pantoprazole  40 mg Intravenous BID    And    sodium chloride (PF)  10 mL Intravenous BID    amLODIPine  10 mg Oral Daily    atenolol  50 mg Oral BID    atorvastatin  40 mg Oral Nightly    guaiFENesin  600 mg Oral BID    isosorbide mononitrate  60 mg Oral Daily    budesonide  0.5 mg Nebulization BID    albuterol  2.5 mg Nebulization TID    sodium chloride flush  10 mL Intravenous 2 times per day    sodium chloride (PF)  10 mL Intravenous Once    piperacillin-tazobactam  2.25 g Intravenous Q8H    sodium chloride  250 mL Intravenous Once    levetiracetam  500 mg Intravenous Q12H     PRN Meds: potassium chloride **OR** potassium alternative oral replacement **OR** potassium chloride, magnesium sulfate, hydrALAZINE, sodium chloride flush, magnesium hydroxide, ondansetron, acetaminophen, albuterol      Intake/Output Summary (Last 24 hours) at 12/21/2019 1346  Last data filed at 12/21/2019 0600  Gross per 24 hour   Intake 320 ml   Output 800 ml   Net -480 ml       Exam:    BP (!) 111/52   Pulse (!) 48   Temp 97.6 °F (36.4 °C) (Axillary)   Resp 17   Ht 5' 5\" (1.651 m)   Wt 100 lb 5 oz (45.5 kg)   SpO2 (!) 86%   BMI 16.69 kg/m²     Constitutional: Awake and alert lying in bed comfortably  Head: Normocephalic, atraumatic  Eyes: EOMI, PERRLA  ENT: moist mucous membranes, high flow nasal cannula in place  Neck: neck supple, trachea midline  Lungs: Good inspiratory effort, bilateral crackles and rhonchi, mild wheeze  Heart: RRR, normal S1 and S2, no murmurs  GI: Soft, non-distended, non tender, no guarding, no rebound, +BS  MSK: no edema noted  Pulses: 2+ pulses bilaterally  Skin: warm, dry  Psych: appropriate affect         Labs:   Recent Labs     12/19/19  0516  12/20/19  0538  12/20/19  1557 12/20/19  2316 12/21/19  0506 12/21/19  1258   WBC 5.8  --  5.2  --   --   --  4.4*  --    HGB 7.3*   < > 7.8*   < > 8.6* 8.9* 8.6* 8.6*   HCT 23.2*   < > 25.0*   < > 27.7* 28.7* 28.1*  --      --  249  --   --   --  270  --     < > = values in this interval not displayed. Recent Labs     12/19/19  0516 12/20/19  0538 12/21/19  0506 12/21/19  1258    143 141  --    K 3.7 3.7 3.7  --    * 114* 111*  --    CO2 17* 17* 16*  --    BUN 30* 26* 27*  --    CREATININE 2.46* 2.38* 2.62* 2.1*   CALCIUM 7.3* 7.5* 7.9*  --    PHOS 4.1 4.4 5.4*  --      Recent Labs     12/19/19  0516 12/20/19  0538 12/21/19  0506   AST 14 11 10   ALT 30 23 21   BILITOT <0.2 <0.2 <0.2   ALKPHOS 62 58 65     Recent Labs     12/19/19  0516 12/20/19  0538 12/21/19  0506   INR 1.1 1.1 1.0     Recent Labs     12/20/19  1557 12/20/19  2316 12/21/19  0506   TROPONINI 0.036* 0.034* 0.028*       Urinalysis:      Lab Results   Component Value Date    NITRU Negative 11/28/2019    WBCUA 0-2 11/28/2019    BACTERIA Negative 11/28/2019    RBCUA 0-2 11/28/2019    BLOODU Negative 11/28/2019    SPECGRAV 1.013 11/28/2019    GLUCOSEU 100 11/28/2019       Radiology:  XR CHEST PORTABLE   Final Result      Interval lines and tubes as detailed. Stable appearance of the lung bases. Emphysema. XR CHEST PORTABLE   Final Result      1. NODULAR DENSITY IS NOW REVEALED IN THE LEFT LOWER LOBE.    2BIBASILAR AREAS OF ATELECTASIS, INFILTRATES CONSOLIDATIONS WITH BILATERAL PLEURAL EFFUSIONS. SUPERIMPOSED UPON COPD   . XR CHEST PORTABLE   Final Result      No significant interval change in right lung infiltrate and small bilateral pleural effusions.             XR CHEST PORTABLE   Final monitor renal function     # DVT  - warfarin on hold due to GI bleed  - resume when OK with GI     # Severe protein calorie malnutrition  - encourage po intake     Disposition: Patient sp EGD 12/15 and still on PPI BID. GI following. Transferred to the ICU on 12/20 and intubated 12/21. Anticipated length of stay greater than 48 hours.     PURVI CORDEROH.S.,   Internal Medicine          Electronically signed by PURVI CORDEROHDO KYLIE on 12/21/2019 at 1:46 PM

## 2019-12-21 NOTE — PROGRESS NOTES
Nutrition Assessment (Enteral Nutrition)    Type and Reason for Visit: Consult(TF O/M)    Nutrition Recommendations: Modify current diet, Start Tube Feeding(Modify diet to NPO; Start tube feeding Semi-Elemental (Vital AF 1.2) via OG at 15 mL/hr x 24 hrs. 200 mL flush Q4 hrs. Advance by 10 mL/day to goal rate of 35 mL/hr. (968 kcals, 80 gm protein, 1784 mL fluid). Recommend 200 mg loading dose thiamine & MVI). Monitor potassium, phosphorus, and magnesium for signs of refeeding. Hold EN and replete if values drop below WDL. Nutrition Assessment: Pt continues to be severely malnourished now with a change in status requiring intubation. High risk for refeeding due to poor intake and severe malnutrition. Will start tube feeding. Recommend 200 mg IV thiamine loading dose prior to initiating EN. Recommend multivitamin due to pt's malnutrition. Will advance EN slowly due to refeeding risk. Malnutrition Assessment:  · Malnutrition Status: Meets the criteria for severe malnutrition  · Context: Chronic illness  · Findings of the 6 clinical characteristics of malnutrition (Minimum of 2 out of 6 clinical characteristics is required to make the diagnosis of moderate or severe Protein Calorie Malnutrition based on AND/ASPEN Guidelines):  1. Energy Intake-Less than or equal to 75% of estimated energy requirement, Greater than or equal to 1 month    2. Weight Loss-2% loss or greater((4.2%)), in 1 month  3. Fat Loss-Severe subcutaneous fat loss, Orbital, Fat overlying the ribs, Triceps  4. Muscle Loss-Severe muscle mass loss, Temples (temporalis muscle), Clavicles (pectoralis and deltoids), Scapula (trapezius)  5. Fluid Accumulation-No significant fluid accumulation,    6.   Strength-Not measured    Nutrition Risk Level: High    Nutrition Needs:  · Estimated Daily Total Kcal: 420 (10 kcals/kg due to refeeding risk) with goal of 4124-9082 (28-30 kcals/kg) while intubated  · Estimated Daily Protein (g): 76-84 gm (1.8-2 g/kg IBW)  · Estimated Daily Fluid (ml/day): ~1830 mL (Annita-Janneth)    Nutrition Diagnosis:   · Problem: Inadequate oral intake  · Etiology: related to Impaired respiratory function-inability to consume food     Signs and symptoms:  as evidenced by NPO status due to medical condition, Intubation    Objective Information:  · Nutrition-Focused Physical Findings: Peripheral IV. Propofol @10.5 mL/hr. OGT. Black BM 12/21. No edema per nsg. · Wound Type: Wound Consult Pending(Surigical incision on chest, redness on coccyx and buttocks)  · Current Nutrition Therapies:  · Oral Diet Orders: No Added Salt (3-4gm)   · Oral Diet intake: 0%  · Oral Nutrition Supplement (ONS) Orders: Standard High Calorie Oral Supplement(tid)  · ONS intake: %(If she feels like drinking it prior to intubation)  · Tube Feeding (TF) Orders:   · Feeding Route: Orogastric  · Formula: Semi-elemental  · Rate (ml/hr):15 mL/hr   · Volume (ml/day): 360 mL/hr  · Duration: Continuous  · Additives/Modulars: Protein modular once daily with fluid flush  · Water Flushes: 125 mL Q4 hrs. · Current TF & Flush Orders Provides: None infusing yet  · Goal TF & Flush Orders Provides: Initial EN will provide 360 kcals, 27 gm protein, 1492 mL fluid, goal rate will provie 968 kcals, 80 gm protein, 1784 mL free fluid  · Additional Calories: 277 kcals from propofol  · Anthropometric Measures:  · Ht: 5' 5\" (165.1 cm)   · Current Body Wt: 100 lb (45.4 kg)(12/21 Bed scale- doubt accuracy)  · Admission Body Wt: 91 lb (41.3 kg)(12/13 Bed scale)  · Usual Body Wt: 95 lb (43.1 kg)(11/12 at [facility])  · Weight Change: 4.2% (5 lbs.) x 1 month   · Ideal Body Wt: 125 lb (56.7 kg), % Ideal Body 73%  · BMI Classification: BMI <18.5 Underweight    Nutrition Interventions:   Modify current diet, Start Tube Feeding(Modify diet to NPO; Start tube feeding Semi-Elemental (Vital AF 1.2) via OG at 15 mL/hr x 24 hrs. 200 mL flush Q4 hrs. Advance by 10 mL/day to goal rate of 35 mL/hr. (968 kcals, 80 gm protein, 1784 mL fluid). Recommend 200 mg loading dose thiamine & MVI). Monitor potassium, phosphorus, and magnesium for signs of refeeding. Hold EN and replete if values drop below WDL. Continued Inpatient Monitoring, Education not appropriate at this time    Nutrition Evaluation:   · Evaluation: Goals set(New goal)   · Goals: Tolerance of EN without signs of refeeding syndrome to meet nutrition needs. Weight >91 lbs.    · Monitoring: TF Intake, TF Tolerance, Skin Integrity, I&O, Weight, Pertinent Labs      Electronically signed by Michelle García RD, LD on 12/21/19 at 1:58 PM

## 2019-12-21 NOTE — PROGRESS NOTES
0745 Assessment complete. Patient A/O X4, moves all extremities well, patient is very [facility]. Patient remains on heated high flow 95% 60L and deric well. Resting at present with eyes closed. 1000 Patient had heated high  Flow half out of her nostrils and SPO2 decreased to the low 80s, placed back on and instructed patient to please leave it on. Voiced understanding. No changes in assessment noted. 1100 Seen by Dr Lynch Officer on morning rounds, update given. He discussed her condition with her and the need for her to be intubated, patient in agreement to this plan of care. 1300 Patient was intubated around 1210 pm with a 7.5 tube, 22 at the lip, was pre medicated as ordered per Dr Lynch Officer, see MAR. Propofol drip started per order, see MAR for titration. OG inserted and placed to LIWS without difficulty, gardner also inserted. Lana area remains very redden and irritated, lana care done and nystatin powder applied. Suctioned for large amounts of thick creamy, blood tinged secretions both from ET tube and orally. Beto wrist restraints applied for safety. 1700 Frequent oral and ET tube suctioning for large amounts of secretions, mouth care done. Patients  at bedside and updated on patients condition and questions answered. Repositioned for comfort.

## 2019-12-21 NOTE — PROGRESS NOTES
CRITICAL CARE PROGRESS NOTES    PATIENT NAME: Lary Medrano  MRN: [id number]  SERVICE DATE:  December 21, 2019   SERVICE TIME:  3:54 PM      PRIMARY SERVICE: Critical care medicine    CHIEF COMPLAINTS: Dyspnea    INTERVAL HPI: Patient seen and examined at bedside, Interval Notes, orders reviewed. Discussed on multidisciplinary rounds  Patient remains severely hypoxic on high flow oxygen with FiO2 over 90% continuing to desaturate rapidly, has poor cough effort has not been able to expectorate, very harsh cough with coarse rhonchi indicating retained airway secretions. She is still alert awake and responsive. OBJECTIVE    Body mass index is 16.69 kg/m². PHYSICAL EXAM:  Vitals:  BP (!) 146/74   Pulse 54   Temp 97.5 °F (36.4 °C) (Axillary)   Resp 16   Ht 5' 5\" (1.651 m)   Wt 100 lb 5 oz (45.5 kg)   SpO2 100%   BMI 16.69 kg/m²   General: Patient is alert, awake , tachypneic at rest but not in severe distress somewhat cachectic  Head: Atraumatic , Normocephalic   Eyes: PERRL. No icteric sclera. No conjunctival injection. No discharge   ENT: No nasal  discharge. Pharynx clear. Neck:  Trachea midline. No thyromegaly, no JVD, No cervical adenopathy. Chest : Increased effort, symmetric bilateral excursions  Lung : Diminished breath sounds bilaterally, coarse rhonchi  Heart[de-identified] Regular rhythm and rate. No mumur ,  Rub or gallop  ABD: Benign. Non-tender. Non-distended. No masses or organmegaly. Normal bowel sounds. EXT: Trace pitting edema both lower extremities , No Cyanosis No clubbing  Neuro: no focal weakness  Skin: Warm and dry. No erythema or rash on exposed extremities. DATA:   Recent Labs     12/20/19  0538  12/21/19  0506 12/21/19  1258 12/21/19  1544   WBC 5.2  --  4.4*  --   --    HGB 7.8*   < > 8.6* 8.6* 8.6*   HCT 25.0*   < > 28.1*  --  27.7*   MCV 93.5  --  95.8  --   --      --  270  --   --     < > = values in this interval not displayed.      Recent Labs     12/20/19  4528 12/21/19  0506 12/21/19  1258    141  --    K 3.7 3.7  --    * 111*  --    CO2 17* 16*  --    BUN 26* 27*  --    CREATININE 2.38* 2.62* 2.1*   GLUCOSE 70 117*  --    CALCIUM 7.5* 7.9*  --    PROT 5.3* 5.5*  --    LABALBU 2.1* 2.1*  --    BILITOT <0.2 <0.2  --    ALKPHOS 58 65  --    AST 11 10  --    ALT 23 21  --    LABGLOM 20.0* 17.9* 23*   GFRAA 24.2* 21.7* 28*   GLOB 3.2 3.4  --        MV Settings:     Vent Mode: AC/VC+  Vt Ordered: 400 mL  Rate Set: 16 bmp  FiO2 : 80 %  PEEP/CPAP: 5  Peak Inspiratory Pressure: 46 cmH2O  Mean Airway Pressure: 9.3 cmH20  I:E Ratio: 1:3.70    Recent Labs     12/21/19  1258   PHART 7.217*   VJG7NCH 46*   PO2ART 144*   KDY5EVQ 18.6*   BEART -9*   H3VCHWVI 99*       O2 Device: Heated high flow cannula  O2 Flow Rate (L/min): 60 L/min    DIET TUBE FEED CONTINUOUS/CYCLIC NPO; Semi-elemental (Vital AF 1.2);  Orogastric; Continuous; 15; 35; 24     MEDICATIONS during current hospitalization:    Continuous Infusions:   propofol 40 mcg/kg/min (12/21/19 1330)       Scheduled Meds:   propofol        succinylcholine chloride        chlorhexidine  15 mL Mouth/Throat BID    etomidate  10 mg Intravenous Once    albuterol sulfate HFA  2 puff Inhalation TID    miconazole   Topical BID    predniSONE  40 mg Oral Daily    pantoprazole  40 mg Intravenous BID    And    sodium chloride (PF)  10 mL Intravenous BID    amLODIPine  10 mg Oral Daily    atenolol  50 mg Oral BID    atorvastatin  40 mg Oral Nightly    guaiFENesin  600 mg Oral BID    isosorbide mononitrate  60 mg Oral Daily    budesonide  0.5 mg Nebulization BID    sodium chloride flush  10 mL Intravenous 2 times per day    sodium chloride (PF)  10 mL Intravenous Once    piperacillin-tazobactam  2.25 g Intravenous Q8H    sodium chloride  250 mL Intravenous Once    levetiracetam  500 mg Intravenous Q12H       PRN Meds:potassium chloride **OR** potassium alternative oral replacement **OR** potassium chloride, magnesium pleural effusion is present, with minimal probable left basilar atelectasis. Hyperinflation and coarsening of the bronchovascular structures consistent with COPD appears otherwise unchanged. There is no pneumothorax, cardiomegaly, vascular congestion, or other significant changes identified. WORSENING MODERATE INFILTRATE/CONSOLIDATION OF THE LEFT LUNG BASE SUSPICIOUS FOR BRONCHOPNEUMONIA. SMALL LEFT AND VERY SMALL RIGHT PLEURAL EFFUSIONS. COPD. Ct Head Wo Contrast    Result Date: 11/29/2019  EXAMINATION:  CT HEAD WO CONTRAST HISTORY:   seizure   altered mental status. TECHNIQUE:  Serial axial images without IV contrast were obtained from the vertex to the foramen magnum. Sagittal and coronal reconstructions. All CT scans at this facility use dose modulation, iterative reconstruction, and/or weight based dosing when appropriate to reduce radiation dose to as low as reasonably achievable. COMPARISON:  4/20/2014 RESULT: Post-operative change:  None. Acute change:   No evidence of an acute infarct or other acute parenchymal process. Hemorrhage:    No evidence of acute intracranial hemorrhage. Mass Lesion / Mass Effect:   There is no evidence of an intracranial mass or extraaxial fluid collection. No significant mass effect. Chronic change:   Scattered patchy foci of low attenuation are present within supratentorial white matter which is a nonspecific finding but likely represents mild to moderate microvascular ischemia. Vascular calcifications. Parenchyma: There is no significant volume loss. The brain parenchyma is otherwise within normal limits for age. Ventricles: The ventricles are within normal limits of size and configuration for age. Paranasal sinuses and skull base:  Mucosal thickening ethmoid air cells. Mastoid air cells are clear. The skull base is unremarkable. Soft tissues unremarkable. No acute intracranial process.  Chronic microvascular ischemic changes, appear worsened from CHEST PORTABLE : 2019 CLINICAL HISTORY:  Respiratory failure on a vent . COMPARISON: 2019. TECHNIQUE: A portable upright AP radiograph of the chest was obtained. FINDINGS: There has been interval placement of an orogastric tube with its tip overlying the body of the stomach, likely in good position. An endotracheal tube remains in good apparent position. A right internal jugular approach hemodialysis catheter appears unchanged. There is significantly improved consolidation of the inferolateral left lung base, with otherwise stable changes of COPD. Very small left pleural effusion is suggested. There is no significant right pleural effusion, cardiomegaly, vascular congestion, pneumothorax, or other changes identified. An old healed left posterolateral sixth rib fracture is again noted. INTERVAL PLACEMENT OF AN OROGASTRIC TUBE IN GOOD APPARENT POSITION. IMPROVED INFILTRATE/CONSOLIDATION OF THE INFEROLATERAL LEFT LUNG BASE. OTHERWISE, STABLE CHEST FROM 1128. Xr Chest Portable    Result Date: 2019  Patient MRN: [id number] : 1950 Age:  68 years Gender: Female Order Date: 2019 9:30 PM. Exam: XR CHEST PORTABLE Number of Views: 2 Indication:  Generalized weakness, seizures and rhonchi Comparison: 3/16/2018 Findings: Vascular calcifications thoracic aorta. Interval placement large-bore right-sided central line when compared with the previous study with its distal tip overlying the distal superior vena cava. Airspace disease left lung base. Impression:  1. Findings suspicious for possible left basilar infiltrate/pneumonia and partial atelectasis/scarring. Xr Chest Portable    Result Date: 2019  EXAMINATION: XR CHEST PORTABLE CLINICAL HISTORY: INTUBATION COMPARISONS: 2019, 2140 HOURS, 2018 FINDINGS: Endotracheal tube, 4.7 cm superior to max. Dual-lumen right jugular vein central line with tip in right atrium unchanged. Osseous structures. Cardiopericardial silhouette normal. Lungs hyperexpanded. Ill-defined area of increased opacity left lung base. LEFT LOWER LUNG ATELECTASIS/PNEUMONIA. EMPHYSEMA. Mri Brain Wo Contrast    Result Date: 2019  Patient MRN: [id number] : 1950 Age:  68 years Gender: Female Order Date: 2019 10:45 AM. Exam: MRI BRAIN WO CONTRAST Number of Views: 891 Indication:  Stroke. Comparison: Head CT without contrast 2019. Contrast dosage: None Findings: There is no evidence of restricted diffusion or acute/subacute cerebrovascular infarction/accident. Pituitary gland and sellar/suprasellar regions are unremarkable. No Chiari malformation. . Hippocampal formations are symmetric in signal intensity and morphology bilaterally. Susceptibility weighted imaging demonstrates no evidence of intraparenchymal hemorrhage. No intrinsic noncontrast T1 shortening. Moderate cerebral volume loss/atrophy. No subfalcine, transtentorial or tonsillar herniation or shift. Normal expected appearance of the visualized T2 flow voids. Thinning of the optic lenses bilaterally, otherwise, optic globes and orbital contents are unremarkable. Large bilateral mastoid air cell effusions. Paranasal sinuses well aerated. There are extensive T2 FLAIR abnormal hyperintensities noted bilateral centrum ovale, periventricular regions, extending in the subcortical and deep white matter of the bilateral frontal, parietal, and occipital lobes. Remote lacunar infarctions bilateral cerebellum. Prominence enlarged spaces bilateral basal ganglia. 1. No evidence of restricted diffusion or acute/subacute cerebrovascular infarction/accident. 2. Moderate cerebral volume loss/atrophy with extensive white matter changes suggestive of sequelae moderately severe small vessel ischemic disease. 3. Large bilateral mastoid air cell effusions. 4. Remote lacunar infarctions bilateral cerebellum.     Us Dup Lower Extremities Bilateral Venous    Result Date:

## 2019-12-21 NOTE — PROGRESS NOTES
[facility] Respiratory Therapy Evaluation   Current Order:  Alb tid  Home Regimen: yes     Ordering Physician:jay  Re-evaluation Date:  none Diagnosis: resp failure  Patient Status: Stable / Unstable + Physician notified    The following MDI Criteria must be met in order to convert aerosol to MDI with spacer. If unable to meet, MDI will be converted to aerosol:  []  Patient able to demonstrate the ability to use MDI effectively  []  Patient alert and cooperative  []  Patient able to take deep breath with 5-10 second hold  []  Medication(s) available in this delivery method   []  Peak flow greater than or equal to 200 ml/min            Current Order Substituted To  (same drug, same frequency)   Aerosol to MDI [x] Albuterol Sulfate 0.083% unit dose by aerosol Albuterol Sulfate MDI 2 puffs by inhalation with spacer    [] Levalbuterol 1.25 mg unit dose by aerosol Levalbuterol MDI 2 puffs by inhalation with spacer    [] Levalbuterol 0.63 mg unit dose by aerosol Levalbuterol MDI 2 puffs by inhalation with spacer    [] Ipratropium Bromide 0.02% unit dose by aerosol Ipratropium Bromide MDI 2 puffs by inhalation with spacer    [] Duoneb (Ipratropium + Albuterol) unit dose by aerosol Ipratropium MDI + Albuterol MDI 2 puffs by inhalation w/spacer   MDI to Aerosol [] Albuterol Sulfate MDI Albuterol Sulfate 0.083% unit dose by aerosol    [] Levalbuterol MDI 2 puffs by inhalation Levalbuterol 1.25 mg unit dose by aerosol    [] Ipratropium Bromide MDI by inhalation Ipratropium Bromide 0.02% unit dose by aerosol    [] Combivent (Ipratropium + Albuterol) MDI by inhalation Duoneb (Ipratropium + Albuterol) unit dose by aerosol   Treatment Assessment [Frequency/Schedule]:  Change frequency to: __________________________________________________per Protocol, P&T, MEC      Points 0 1 2 3 4   Pulmonary Status  Non-Smoker  []   Smoking history   < 20 pack years  []   Smoking history  ?  20 pack years  []   Pulmonary Disorder  (acute or chronic)  []   Severe or Chronic w/ Exacerbation  []     Surgical Status No []   Surgeries     General []   Surgery Lower []   Abdominal Thoracic or []   Upper Abdominal Thoracic with  PulmonaryDisorder  []     Chest X-ray Clear/Not  Ordered     []  Chronic Changes  Results Pending  []  Infiltrates, atelectasis, pleural effusion, or edema  []  Infiltrates in more than one lobe []  Infiltrate + Atelectasis, &/or pleural effusion  []    Respiratory Pattern Regular,  RR = 12-20 []  Increased,  RR = 21-25 []  EATON, irregular,  or RR = 26-30 []  Decreased FEV1  or RR = 31-35 []  Severe SOB, use  of accessory muscles, or RR ? 35  []    Mental Status Alert, oriented,  Cooperative []  Confused but Follows commands []  Lethargic or unable to follow commands []  Obtunded  []  Comatose  []    Breath Sounds Clear to  auscultation  []  Decreased unilaterally or  in bases only []  Decreased  bilaterally  []  Crackles or intermittent wheezes []  Wheezes []    Cough Strong, Spontan., & nonproductive []  Strong,  spontaneous, &  productive []  Weak,  Nonproductive []  Weak, productive or  with wheezes []  No spontaneous  cough or may require suctioning []    Level of Activity Ambulatory []  Ambulatory w/ Assist  []  Non-ambulatory []  Paraplegic []  Quadriplegic []    Total    Score:_______     Triage Score:________      Tri       Triage:     1. (>20) Freq: Q3    2. (16-20) Freq: Q4   3. (11-15) Freq: QID & Albuterol Q2 PRN    4. (6-10) Freq: TID & Albuterol Q2 PRN    5. (0-5) Freq Q4prn

## 2019-12-22 NOTE — PROGRESS NOTES
Hospitalist Progress Note      PCP: Shaniqua Schultz MD    Date of Admission: 12/13/2019    Chief Complaint:  Pt seen and examined. Pt electively intubated on 12/21.  Sedated    Medications:  Reviewed    Infusion Medications    propofol 35 mcg/kg/min (12/22/19 0412)     Scheduled Medications    chlorhexidine  15 mL Mouth/Throat BID    etomidate  10 mg Intravenous Once    albuterol sulfate HFA  2 puff Inhalation TID    miconazole   Topical BID    predniSONE  40 mg Oral Daily    pantoprazole  40 mg Intravenous BID    And    sodium chloride (PF)  10 mL Intravenous BID    amLODIPine  10 mg Oral Daily    atenolol  50 mg Oral BID    atorvastatin  40 mg Oral Nightly    guaiFENesin  600 mg Oral BID    isosorbide mononitrate  60 mg Oral Daily    budesonide  0.5 mg Nebulization BID    sodium chloride flush  10 mL Intravenous 2 times per day    sodium chloride (PF)  10 mL Intravenous Once    piperacillin-tazobactam  2.25 g Intravenous Q8H    sodium chloride  250 mL Intravenous Once    levetiracetam  500 mg Intravenous Q12H     PRN Meds: potassium chloride **OR** potassium alternative oral replacement **OR** potassium chloride, magnesium sulfate, hydrALAZINE, sodium chloride flush, magnesium hydroxide, ondansetron, acetaminophen, albuterol      Intake/Output Summary (Last 24 hours) at 12/22/2019 1222  Last data filed at 12/22/2019 8508  Gross per 24 hour   Intake 1367 ml   Output 625 ml   Net 742 ml       Exam:    /73   Pulse (!) 45   Temp 96.6 °F (35.9 °C) (Temporal)   Resp 18   Ht 5' 5\" (1.651 m)   Wt 100 lb 5 oz (45.5 kg)   SpO2 100%   BMI 16.69 kg/m²     Constitutional: sedated  Head: Normocephalic, atraumatic  Eyes: EOMI, PERRLA  ENT: moist mucous membranes, ETT in place  Neck: neck supple, trachea midline  Lungs: bilateral crackles and rhonchi, mild wheeze  Heart: RRR, normal S1 and S2, no murmurs  GI: Soft, non-distended, +BS  MSK: no edema noted  Pulses: 2+ pulses bilaterally  Skin: warm, bleed  - resume when OK with GI     # Severe protein calorie malnutrition  - encourage po intake     Disposition: Patient sp EGD 12/15 and still on PPI BID. GI following. Transferred to the ICU on 12/20 and intubated 12/21. Anticipated length of stay greater than 48 hours.     Argentina Montalvo DO  Internal Medicine          Electronically signed by Argentina Montalvo DO on 12/22/2019 at 12:22 PM

## 2019-12-22 NOTE — FLOWSHEET NOTE
Rested quietly during the night  Propofol decreased to 35 mcg Heart rate 38-45. Tolerating tube feed at 20 cc hr. ABG Lab work and [employer] done this AM BP stable.

## 2019-12-22 NOTE — PROGRESS NOTES
CRITICAL CARE PROGRESS NOTES    PATIENT NAME: Hammad Klein  MRN: [id number]  SERVICE DATE:  December 22, 2019   SERVICE TIME:  11:44 AM      PRIMARY SERVICE: Critical care medicine    CHIEF COMPLAINTS: Sedated, intubated    INTERVAL HPI: Patient seen and examined at bedside, Interval Notes, orders reviewed. Discussed on multidisciplinary rounds  Patient remains sedated, intubated, on the vent, gets restless easily with movements or stimuli, oxygenation is stable on the vent, continue coarse breath sounds and evidence of persistent airway secretions despite aggressive suctioning. Chest x-ray continued to show significant opacities in the lower lobes due to combination of atelectasis and pleural effusions. OBJECTIVE    Body mass index is 16.69 kg/m². PHYSICAL EXAM:  Vitals:  BP (!) 146/68   Pulse (!) 45   Temp 96.8 °F (36 °C) (Temporal)   Resp 17   Ht 5' 5\" (1.651 m)   Wt 100 lb 5 oz (45.5 kg)   SpO2 100%   BMI 16.69 kg/m²   General: Patient is sedated, intubated orally, on the vent, otherwise appeared comfortable in bed, No distress. Head: Atraumatic , Normocephalic   Eyes: PERRL. No icteric sclera. No conjunctival injection. No discharge   ENT: No nasal  discharge. Pharynx clear. Neck:  Trachea midline. No thyromegaly, no JVD, No cervical adenopathy. Chest : Controlled effort, symmetric bilateral excursions  Lung : Diminished breath sounds bilaterally, coarse inspiratory and expiratory rhonchi bilaterally  Heart[de-identified] Irregular rhythm and rate. No mumur ,  Rub or gallop  ABD: Benign. Non-tender. Non-distended. No masses or organmegaly. Normal bowel sounds. EXT: Minimal pitting edema both lower extremities , No Cyanosis No clubbing  Neuro: no focal weakness  Skin: Warm and dry. No erythema or rash on exposed extremities.       DATA:   Recent Labs     12/21/19  0506  12/21/19  2323 12/22/19  0503 12/22/19  0514   WBC 4.4*  --   --   --  5.5   HGB 8.6*   < > 9.3* 7.2* 8.4*   HCT 28.1*   < > 30.5*  -- Intravenous Q8H    sodium chloride  250 mL Intravenous Once    levetiracetam  500 mg Intravenous Q12H       PRN Meds:potassium chloride **OR** potassium alternative oral replacement **OR** potassium chloride, magnesium sulfate, hydrALAZINE, sodium chloride flush, magnesium hydroxide, ondansetron, acetaminophen, albuterol    Radiology  Ct Abdomen Pelvis Wo Contrast Additional Contrast? Oral    Result Date: 12/1/2019  CT ABDOMEN PELVIS WO CONTRAST: 12/1/2019 CLINICAL HISTORY:  Diffuse abd tenderness . COMPARISON: None available. TECHNIQUE: Spiral images were obtained of the abdomen and pelvis without intravenous contrast. Oral contrast was used for bowel opacification. All CT scans at this facility use dose modulation, iterative reconstruction, and/or weight based dosing when appropriate to reduce radiation dose to as low as reasonably achievable. FINDINGS: An enteric tube is noted with its tip in the fundus of the otherwise normal-appearing stomach. A Bishop catheter decompresses the urinary bladder. A rectal tube is noted in good position. The gallbladder is moderately distended with wall thickening, which may be acute cholecystitis or reactive from metabolic derangement. Very small calcified gallstones dependently are suggested. Fusiform aneurysmal dilatation of the infrarenal abdominal aorta is present to approximately 5.5 x 5.3 cm in caliber. There is no retroperitoneal hemorrhage, ascites, abnormal biliary or bowel dilatation. The aneurysm does not extend into the normal caliber common iliac arteries, which have moderate to marked atherosclerotic plaquing. Postoperative changes from previous vascular surgery of the right common femoral artery are otherwise unremarkable. Mild to moderate infiltrate/consolidation of the left lung base is noted, with a very small left pleural effusion. The visualized right lung base is clear.  The unenhanced liver, spleen, pancreas, adrenal glands, kidneys, bowel loops, decompressed urinary bladder, and additional images of pelvis are unremarkable. MODERATELY DISTENDED GALLBLADDER WITH MODERATE WALL THICKENING, WHICH MAY BE ACUTE CHOLECYSTITIS OR REACTIVE. APPROXIMATELY 5.5 CM FUSIFORM INFRARENAL ABDOMINAL AORTIC ANEURYSM. MILD TO MODERATE ATELECTASIS/CONSOLIDATION OF THE LEFT LUNG BASE WITH A SMALL PLEURAL EFFUSION. BRONCHOPNEUMONIA SHOULD BE EXCLUDED CLINICALLY NASOGASTRIC, RECTAL TUBES AND A C[place]AM CATHETER IN GOOD POSITION. Xr Chest Standard (2 Vw)    Result Date: 12/10/2019  XR CHEST (2 VW): 12/10/2019 CLINICAL HISTORY:  Pneumonia . COMPARISON: 12/7/2019. Upright AP and lateral radiographs of the chest were obtained. FINDINGS: Small pleural effusions and infiltrate/consolidation of the lung bases, greater on the left appears unchanged. Hyperinflation and coarsening of the bronchovascular structures consistent with COPD appears otherwise unchanged. There is no cardiomegaly, vascular congestion, pneumothorax, or displaced fractures identified. STABLE CHEST COMPARED TO 12/7/2019. SMALL PLEURAL EFFUSIONS AND BIBASILAR INFILTRATE/CONSOLIDATION, GREATER ON THE LEFT. COPD. Xr Chest Standard (2 Vw)    Result Date: 12/7/2019  EXAMINATION: XR CHEST (2 VW) CLINICAL HISTORY: PNEUMONIA COMPARISONS: DECEMBER 5, 2019, DECEMBER 1, 2019 FINDINGS: Osseous structures intact. Cardiopericardial silhouette normal. Aorta calcified and tortuous. Right lung clear and hyperexpanded. Blunting left costophrenic angle, with an area of increased opacity left lower lobe obscuring left lower lung, diaphragm, left cardiac silhouette, again identified, and unchanged. NO INTERVAL CHANGE, LEFT PLEURAL EFFUSION, AND LEFT LOWER LOBE PNEUMONIA. EMPHYSEMA. Xr Chest Standard (2 Vw)    Result Date: 12/5/2019  XR CHEST (2 VW) : 12/5/2019 CLINICAL HISTORY:  Pneumonia versus atelectasis . COMPARISON: 12/1/2019. TECHNIQUE: ROUTINE.  FINDINGS: Since the prior study, endotracheal, orogastric tubes and thickening ethmoid air cells. Mastoid air cells are clear. The skull base is unremarkable. Soft tissues unremarkable. No acute intracranial process. Chronic microvascular ischemic changes, appear worsened from 4/20/2014. Xr Chest Portable    Result Date: 12/22/2019  XR CHEST PORTABLE Exam Date/Time:  12/22/2019 6:00 AM Clinical History:   atelectasis Comparison:  12/21/2019  RESULT: Lines, tubes, and devices:  Endotracheal tube, unchanged. Gastric decompression tube with tip below the diaphragm and out of field of view, grossly unchanged. Lungs and pleura:  Hyperinflated lungs with flattening of the diaphragms and paucity of lung markings consistent with emphysema. Bibasilar opacities and pleural effusions, with the right lung similar to prior, and the left lung appearing slightly improved from prior. . No pneumothorax. Cardiomediastinal silhouette:  Stable cardiomediastinal silhouette. Other:  No acute osseous findings. Remote left-sided rib fractures. Stable lines and tubes. Bibasilar opacities and pleural effusions, with the findings at the left lung base appearing slightly improved from prior. Xr Chest Portable    Result Date: 12/21/2019  XR CHEST PORTABLE Exam Date/Time:  12/21/2019 12:15 PM Clinical History:   post intubation Comparison:  12/21/2019 0448  RESULT: Lines, tubes, and devices:  Interval endotracheal intubation with tip approximately 7 cm above the max. Interval placement of gastric decompression tube with tip near the level of the diaphragm. Consider further advancement. Lungs and pleura:  Hyperinflated lungs with flattening of the diaphragms and paucity of lung markings consistent with emphysema. Bibasilar opacities and pleural effusions, overall similar to prior. Lung nodule described on recent chest radiograph is not well-visualized on this study. . No pneumothorax. Normal pulmonary vascular pattern. Cardiomediastinal silhouette:  Stable cardiomediastinal silhouette.  Other: No acute osseous findings. Remote left-sided rib fractures. Interval lines and tubes as detailed. Stable appearance of the lung bases. Emphysema. Xr Chest Portable    Result Date: 12/21/2019  EXAMINATION: AP erect chest  CLINICAL HISTORY: Respiratory failure COMPARISONS: December 20, 2019  FINDINGS: Two views of the chest are submitted. The cardiac silhouette is unchanged Pulmonary vascular is attenuated, lungs are hyperinflated. Nodular density left lower lobe measuring 1.5 cm. There are bibasilar areas of atelectasis, infiltrates consolidations with bilateral pleural effusions. The left pleural effusion has decreased compared to prior examination. Pneumothoraces. Old healed left sixth rib fracture. 1. NODULAR DENSITY IS NOW REVEALED IN THE LEFT LOWER LOBE. 2BIBASILAR AREAS OF ATELECTASIS, INFILTRATES CONSOLIDATIONS WITH BILATERAL PLEURAL EFFUSIONS. SUPERIMPOSED UPON COPD . Xr Chest Portable    Result Date: 12/20/2019  Portable chest radiograph History: Respiratory failure Technique: AP portable view of the chest obtained. Comparison: Portable chest radiograph from December 16, 2019 Findings: Atherosclerotic calcification of the thoracic aorta. Suboptimal inspiration. No significant interval change in bilateral pleural effusions, left greater than right. No significant interval change of patchy and linear opacities throughout the right lung. No pneumothorax. Bones of the thorax appear intact. No significant interval change in right lung infiltrate and small bilateral pleural effusions. Xr Chest Portable    Result Date: 12/16/2019  EXAMINATION: XR CHEST PORTABLE DATE AND TIME:12/16/2019 3:00 PM CLINICAL HISTORY: Shortness of breath   sob  COMPARISONS: December 13, 2019  FINDINGS: Left effusion with compressive atelectasis. Fusion may be partially loculated.  New infiltrate throughout the right lung most extensive in AN OROGASTRIC TUBE IN GOOD APPARENT POSITION. IMPROVED INFILTRATE/CONSOLIDATION OF THE INFEROLATERAL LEFT LUNG BASE. OTHERWISE, STABLE CHEST FROM 1128. Xr Chest Portable    Result Date: 2019  Patient MRN: [id number] : 1950 Age:  68 years Gender: Female Order Date: 2019 9:30 PM. Exam: XR CHEST PORTABLE Number of Views: 2 Indication:  Generalized weakness, seizures and rhonchi Comparison: 3/16/2018 Findings: Vascular calcifications thoracic aorta. Interval placement large-bore right-sided central line when compared with the previous study with its distal tip overlying the distal superior vena cava. Airspace disease left lung base. Impression:  1. Findings suspicious for possible left basilar infiltrate/pneumonia and partial atelectasis/scarring. Xr Chest Portable    Result Date: 2019  EXAMINATION: XR CHEST PORTABLE CLINICAL HISTORY: INTUBATION COMPARISONS: 2019, 2140 HOURS, 2018 FINDINGS: Endotracheal tube, 4.7 cm superior to max. Dual-lumen right jugular vein central line with tip in right atrium unchanged. Osseous structures. Cardiopericardial silhouette normal. Lungs hyperexpanded. Ill-defined area of increased opacity left lung base. LEFT LOWER LUNG ATELECTASIS/PNEUMONIA. EMPHYSEMA. Mri Brain Wo Contrast    Result Date: 2019  Patient MRN: [id number] : 1950 Age:  68 years Gender: Female Order Date: 2019 10:45 AM. Exam: MRI BRAIN WO CONTRAST Number of Views: 104 Indication:  Stroke. Comparison: Head CT without contrast 2019. Contrast dosage: None Findings: There is no evidence of restricted diffusion or acute/subacute cerebrovascular infarction/accident. Pituitary gland and sellar/suprasellar regions are unremarkable. No Chiari malformation. . Hippocampal formations are symmetric in signal intensity and morphology bilaterally. Susceptibility weighted imaging demonstrates no evidence of intraparenchymal hemorrhage.  No intrinsic noncontrast T1 shortening. Moderate cerebral volume loss/atrophy. No subfalcine, transtentorial or tonsillar herniation or shift. Normal expected appearance of the visualized T2 flow voids. Thinning of the optic lenses bilaterally, otherwise, optic globes and orbital contents are unremarkable. Large bilateral mastoid air cell effusions. Paranasal sinuses well aerated. There are extensive T2 FLAIR abnormal hyperintensities noted bilateral centrum ovale, periventricular regions, extending in the subcortical and deep white matter of the bilateral frontal, parietal, and occipital lobes. Remote lacunar infarctions bilateral cerebellum. Prominence enlarged spaces bilateral basal ganglia. 1. No evidence of restricted diffusion or acute/subacute cerebrovascular infarction/accident. 2. Moderate cerebral volume loss/atrophy with extensive white matter changes suggestive of sequelae moderately severe small vessel ischemic disease. 3. Large bilateral mastoid air cell effusions. 4. Remote lacunar infarctions bilateral cerebellum. Us Dup Lower Extremities Bilateral Venous    Result Date: 11/29/2019  VENOUS DUPLEX ULTRASOUND OF THE BILATERAL LOWER EXTREMITY CLINICAL HISTORY:  BILATERAL LEG PAIN, PATIENT WITH DYSPNEA AND ELEVATED D-DIMER, EVALUATE FOR THROMBOPHLEBITIS OR DEEP VENOUS THROMBOSIS IN BOTH LOWER EXTREMITIES COMPARISON:  NONE AVAILABLE TECHNIQUE:  Sonographic imaging of the deep venous system of the bilateral  lower extremity was performed by a registered sonographer and the images were submitted for interpretation. FINDINGS:  The common femoral, superficial femoral, and popliteal veins demonstrate normal color flow, augmentation, and compressibility. No venous duplex ultrasound evidence of DVT in the bilateral lower extremity. There is suboptimal visualization of the left calf veins. The visualized portions of the right calf veins appear patent.      NO SONOGRAPHIC EVIDENCE OF DVT WITHIN THE  BILATERAL LOWER EXTREMITY. IMPRESSION AND SUGGESTION:  1. Acute on chronic hypoxic and hypercapnic respiratory failure worsening steadily with evidence of retained airway secretions and mucous plugging, intubated yesterday, stabilized but still requiring aggressive suctioning with persistent opacities as reported above  2. Bilateral lower lobe opacities probably a combination of progressive atelectasis in addition to gradually increasing pleural effusions  3. Severe aortic stenosis reported by recent echo probably contributing to heart failure picture, with bilateral pleural effusions  4. Chronic kidney disease remains stable  5. COPD with acute exacerbation, continuing aggressive therapy with bronchodilators, antibiotics, steroids, and aggressive suctioning  6. Patient presented with acute GI bleed with  blood loss anemia, improved with no active bleeding at this point noted  Discussed all of the above at length with patient's family including her boyfriend of 21 years, home she designated as the next of kin to discuss care with to me before I intubated her. We will continue all current treatment, may need bronchoscopy tomorrow if we're continuing to have difficulty in mobilizing airway secretion  I spent 38 minutes providing critical care. This time is excluding time spent performing procedures.       Electronically signed by Rakesh Fletcher MD, [facility] on 12/22/2019 at 11:44 AM

## 2019-12-23 NOTE — PROGRESS NOTES
INFILTRATES CONSOLIDATIONS WITH BILATERAL PLEURAL EFFUSIONS. SUPERIMPOSED UPON COPD   . XR CHEST PORTABLE   Final Result      No significant interval change in right lung infiltrate and small bilateral pleural effusions. XR CHEST PORTABLE   Final Result   INCREASED INTERSTITIAL OPACITIES THROUGHOUT THE RIGHT LUNG MOST STRIKING IN THE UPPER LUNG ZONE. PERSISTENT PLEURAL PARENCHYMAL CHANGES AT THE LEFT BASE. FINDINGS REPRESENT A PROGRESSION OF DISEASE WHEN COMPARED TO DECEMBER 13, 2019. XR CHEST PORTABLE   Final Result   Impression:     1. Persistent left basilar airspace disease suspicious for infiltrate/pneumonia. Continued follow-up to complete resolution is recommended to exclude underlying mass or malignancy. 2. Stable, enlarged mediastinal silhouette with thoracic aortic vascular calcifications. XR CHEST PORTABLE    (Results Pending)           Assessment/Plan:    68 y.o. female with PMH of COPD with chronic respiratory failure non compliant with BiPAP, CKD4, hyperthyroidism, seizure disorder, remote lacunar CVAs, DVT on warfarin, who was just discharged from the hospital on 12/11 after being managed for acute hypoxic/ hypercapnic respiratory failure, COPD exacerbation, Pseudomonas pneumonia, sent to SNF on IV Zosyn x 2 weeks and today presented with:     # Acute blood loss anemia and GI bleed- stable  - noted to have black,tarry stools at the NH  - Hb was 6.6 on arrival, FOBT was positive, INR-3.8,   - Vit K and FFPs administered,  - Hb is 8.0 s/p transfusion of 2 unit of PRBCs,   - EGD done 12/15 showed multiple small areas of ooze in the pyloric channel and duodenal bulb,   - continue Protonix BID  - GI following     # Pseudomonas PNA  - continue IV Zosyn, end date 12/24 per ID notes from last admission  - still requiring high O2- transferred to the ICU on 12/20 for closer monitoring.  Intubated 12/21 - intensivist to manage vent  - repeat CXR with continued infiltrate     # COPD

## 2019-12-23 NOTE — ACP (ADVANCE CARE PLANNING)
This patient has both a [facility] document and a Living Will Declaration. A copy of both documents can be located in the [employer] Tab.     Arias Perez BCC

## 2019-12-23 NOTE — PROGRESS NOTES
CRITICAL CARE PROGRESS NOTES    PATIENT NAME: Hammad Klein  MRN: [id number]  SERVICE DATE:  December 23, 2019   SERVICE TIME:  3:05 PM      PRIMARY SERVICE: Critical care medicine    CHIEF COMPLAINTS: Sedated, on the vent    INTERVAL HPI: Patient seen and examined at bedside, Interval Notes, orders reviewed. Discussed on multidisciplinary rounds  Patient remains sedated, intubated, on the vent, hemodynamically stable, minimal temperature elevation noted today currently 99.7, oxygenating better with improving FiO2 requirements, continued removal of large amount of thick dark secretions endotracheally. Blood gases showed improving oxygenation with a PO2 of 89 on 50% FiO2. Chest x-ray showed improving atelectasis and opacity in the right lower lung field      OBJECTIVE    Body mass index is 16.69 kg/m². PHYSICAL EXAM:  Vitals:  /60   Pulse 66   Temp 99.7 °F (37.6 °C) (Temporal)   Resp 19   Ht 5' 5\" (1.651 m)   Wt 100 lb 5 oz (45.5 kg)   SpO2 96%   BMI 16.69 kg/m²   General: Patient is sedated, intubated, on the vent, otherwise appeared comfortable in bed, No distress. Head: Atraumatic , Normocephalic   Eyes: PERRL. No icteric sclera. No conjunctival injection. No discharge   ENT: No nasal  discharge. Pharynx clear. Neck:  Trachea midline. No thyromegaly, no JVD, No cervical adenopathy. Chest : Controlled ventilatory effort, symmetric bilateral excursions  Lung : Decreased breath sounds bilaterally, coarse rhonchi  Heart[de-identified] Regular rhythm and rate. No mumur ,  Rub or gallop  ABD: Benign. Non-tender. Non-distended. No masses or organmegaly. Normal bowel sounds. EXT: 1+ pitting edema both lower extremities , No Cyanosis No clubbing  Neuro: no focal weakness  Skin: Warm and dry. No erythema or rash on exposed extremities.       DATA:   Recent Labs     12/22/19  0514 12/23/19  0510 12/23/19  0546   WBC 5.5 8.7  --    HGB 8.4* 8.7* 8.6*   HCT 27.1* 28.3*  --    MCV 95.8 94.9  --     228  -- Recent Labs     12/22/19  0514 12/23/19  0510 12/23/19  0546   * 143  --    K 3.5 3.4  --    * 112*  --    CO2 15* 17*  --    BUN 30* 31*  --    CREATININE 2.84* 2.69* 2.2*   GLUCOSE 90 83  --    CALCIUM 7.9* 7.8*  --    PROT 5.2* 5.7*  --    LABALBU 2.0* 2.2*  --    BILITOT <0.2 <0.2  --    ALKPHOS 56 67  --    AST 15 14  --    ALT 20 19  --    LABGLOM 16.3* 17.4* 22*   GFRAA 19.8* 21.1* 27*   GLOB 3.2 3.5  --        MV Settings:     Vent Mode: AC/VC  Vt Ordered: 400 mL  Rate Set: 16 bmp  FiO2 : 50 %  PEEP/CPAP: 5  Peak Inspiratory Pressure: 41 cmH2O  Mean Airway Pressure: 9.3 cmH20  I:E Ratio: 1:4.00    Recent Labs     12/23/19  0546   PHART 7.325*   ZPX2VBO 37   PO2ART 89*   BLC5QQJ 19.0*   BEART -7*   [id number] 96*       O2 Device: Ventilator  O2 Flow Rate (L/min): 60 L/min    DIET TUBE FEED CONTINUOUS/CYCLIC NPO; Semi-elemental (Vital AF 1.2);  Orogastric; Continuous; 15; 35; 24     MEDICATIONS during current hospitalization:    Continuous Infusions:   propofol 35 mcg/kg/min (12/23/19 1032)       Scheduled Meds:   chlorhexidine  15 mL Mouth/Throat BID    etomidate  10 mg Intravenous Once    albuterol sulfate HFA  2 puff Inhalation TID    miconazole   Topical BID    predniSONE  40 mg Oral Daily    pantoprazole  40 mg Intravenous BID    And    sodium chloride (PF)  10 mL Intravenous BID    amLODIPine  10 mg Oral Daily    atenolol  50 mg Oral BID    atorvastatin  40 mg Oral Nightly    guaiFENesin  600 mg Oral BID    isosorbide mononitrate  60 mg Oral Daily    budesonide  0.5 mg Nebulization BID    sodium chloride flush  10 mL Intravenous 2 times per day    sodium chloride (PF)  10 mL Intravenous Once    piperacillin-tazobactam  2.25 g Intravenous Q8H    sodium chloride  250 mL Intravenous Once    levetiracetam  500 mg Intravenous Q12H       PRN Meds:potassium chloride **OR** potassium alternative oral replacement **OR** potassium chloride, magnesium sulfate, hydrALAZINE, sodium chloride flush, magnesium hydroxide, ondansetron, acetaminophen, albuterol    Radiology  Ct Abdomen Pelvis Wo Contrast Additional Contrast? Oral    Result Date: 12/1/2019  CT ABDOMEN PELVIS WO CONTRAST: 12/1/2019 CLINICAL HISTORY:  Diffuse abd tenderness . COMPARISON: None available. TECHNIQUE: Spiral images were obtained of the abdomen and pelvis without intravenous contrast. Oral contrast was used for bowel opacification. All CT scans at this facility use dose modulation, iterative reconstruction, and/or weight based dosing when appropriate to reduce radiation dose to as low as reasonably achievable. FINDINGS: An enteric tube is noted with its tip in the fundus of the otherwise normal-appearing stomach. A Bishop catheter decompresses the urinary bladder. A rectal tube is noted in good position. The gallbladder is moderately distended with wall thickening, which may be acute cholecystitis or reactive from metabolic derangement. Very small calcified gallstones dependently are suggested. Fusiform aneurysmal dilatation of the infrarenal abdominal aorta is present to approximately 5.5 x 5.3 cm in caliber. There is no retroperitoneal hemorrhage, ascites, abnormal biliary or bowel dilatation. The aneurysm does not extend into the normal caliber common iliac arteries, which have moderate to marked atherosclerotic plaquing. Postoperative changes from previous vascular surgery of the right common femoral artery are otherwise unremarkable. Mild to moderate infiltrate/consolidation of the left lung base is noted, with a very small left pleural effusion. The visualized right lung base is clear. The unenhanced liver, spleen, pancreas, adrenal glands, kidneys, bowel loops, decompressed urinary bladder, and additional images of pelvis are unremarkable. MODERATELY DISTENDED GALLBLADDER WITH MODERATE WALL THICKENING, WHICH MAY BE ACUTE CHOLECYSTITIS OR REACTIVE. APPROXIMATELY 5.5 CM FUSIFORM INFRARENAL ABDOMINAL AORTIC ANEURYSM. 12/23/2019  EXAMINATION: XR CHEST PORTABLE REASON FOR EXAM: Bilateral atelectasis and pleural effusions FINDINGS: Endotracheal tube and NG tube appear satisfactory and unchanged. Opacification at the bases continues to improve. Decreasing pleural effusion on the left. Atelectasis and residual right pleural effusion not significantly changed. Increased interstitial markings in the central and upper lung fields particularly on the right side likely related to technique and overlying tubing. No new developing infiltrate or vascular congestion. RESIDUAL PLEURAL EFFUSIONS AND ATELECTASIS RIGHT BASE. OVERALL SLIGHT IMPROVEMENT FROM ONE DAY EARLIER. TUBES AND CATHETERS SATISFACTORY. Xr Chest Portable    Result Date: 12/22/2019  XR CHEST PORTABLE Exam Date/Time:  12/22/2019 6:00 AM Clinical History:   atelectasis Comparison:  12/21/2019  RESULT: Lines, tubes, and devices:  Endotracheal tube, unchanged. Gastric decompression tube with tip below the diaphragm and out of field of view, grossly unchanged. Lungs and pleura:  Hyperinflated lungs with flattening of the diaphragms and paucity of lung markings consistent with emphysema. Bibasilar opacities and pleural effusions, with the right lung similar to prior, and the left lung appearing slightly improved from prior. . No pneumothorax. Cardiomediastinal silhouette:  Stable cardiomediastinal silhouette. Other:  No acute osseous findings. Remote left-sided rib fractures. Stable lines and tubes. Bibasilar opacities and pleural effusions, with the findings at the left lung base appearing slightly improved from prior. Xr Chest Portable    Result Date: 12/21/2019  XR CHEST PORTABLE Exam Date/Time:  12/21/2019 12:15 PM Clinical History:   post intubation Comparison:  12/21/2019 0448  RESULT: Lines, tubes, and devices:  Interval endotracheal intubation with tip approximately 7 cm above the max.  Interval placement of gastric decompression tube with tip near the level of the diaphragm. Consider further advancement. Lungs and pleura:  Hyperinflated lungs with flattening of the diaphragms and paucity of lung markings consistent with emphysema. Bibasilar opacities and pleural effusions, overall similar to prior. Lung nodule described on recent chest radiograph is not well-visualized on this study. . No pneumothorax. Normal pulmonary vascular pattern. Cardiomediastinal silhouette:  Stable cardiomediastinal silhouette. Other:  No acute osseous findings. Remote left-sided rib fractures. Interval lines and tubes as detailed. Stable appearance of the lung bases. Emphysema. Xr Chest Portable    Result Date: 12/21/2019  EXAMINATION: AP erect chest  CLINICAL HISTORY: Respiratory failure COMPARISONS: December 20, 2019  FINDINGS: Two views of the chest are submitted. The cardiac silhouette is unchanged Pulmonary vascular is attenuated, lungs are hyperinflated. Nodular density left lower lobe measuring 1.5 cm. There are bibasilar areas of atelectasis, infiltrates consolidations with bilateral pleural effusions. The left pleural effusion has decreased compared to prior examination. Pneumothoraces. Old healed left sixth rib fracture. 1. NODULAR DENSITY IS NOW REVEALED IN THE LEFT LOWER LOBE. 2BIBASILAR AREAS OF ATELECTASIS, INFILTRATES CONSOLIDATIONS WITH BILATERAL PLEURAL EFFUSIONS. SUPERIMPOSED UPON COPD . Xr Chest Portable    Result Date: 12/20/2019  Portable chest radiograph History: Respiratory failure Technique: AP portable view of the chest obtained. Comparison: Portable chest radiograph from December 16, 2019 Findings: Atherosclerotic calcification of the thoracic aorta. Suboptimal inspiration. No significant interval change in bilateral pleural effusions, left greater than right. No significant interval change of patchy and linear opacities throughout the right lung. No pneumothorax.  Bones of the thorax appear intact. No significant interval change in right lung infiltrate and small bilateral pleural effusions. Xr Chest Portable    Result Date: 2019  EXAMINATION: XR CHEST PORTABLE DATE AND TIME:2019 3:00 PM CLINICAL HISTORY: Shortness of breath   sob  COMPARISONS: 2019  FINDINGS: Left effusion with compressive atelectasis. Fusion may be partially loculated. New infiltrate throughout the right lung most extensive in the right upper lung zone. Possibility of a slightly atypical interstitial edema presentation is not excluded. Clinical correlation suggested. INCREASED INTERSTITIAL OPACITIES THROUGHOUT THE RIGHT LUNG MOST STRIKING IN THE UPPER LUNG ZONE. PERSISTENT PLEURAL PARENCHYMAL CHANGES AT THE LEFT BASE. FINDINGS REPRESENT A PROGRESSION OF DISEASE WHEN COMPARED TO 2019. Xr Chest Portable    Result Date: 2019  Patient MRN: [id number] : 1950 Age:  68 years Gender: Female Order Date: 2019 2:15 PM. Exam: XR CHEST PORTABLE Number of Views: 1 Indication:  Weakness and chest pain Comparison: 12/10/2019 Findings: Stable, enlarged cardiomediastinal silhouette. Left basilar airspace disease. Vascular calcifications thoracic aorta. No pneumothorax. Impression:  1. Persistent left basilar airspace disease suspicious for infiltrate/pneumonia. Continued follow-up to complete resolution is recommended to exclude underlying mass or malignancy. 2. Stable, enlarged mediastinal silhouette with thoracic aortic vascular calcifications. Xr Chest Portable    Result Date: 2019  XR CHEST PORTABLE : 2019 CLINICAL HISTORY:  Respiratory failure on a vent . COMPARISON: 2019. TECHNIQUE: A portable upright AP radiograph of the chest was obtained. FINDINGS: There has been interval placement of an orogastric tube with its tip overlying the body of the stomach, likely in good position. An endotracheal tube remains in good apparent position.  A right internal jugular approach hemodialysis catheter appears unchanged. There is significantly improved consolidation of the inferolateral left lung base, with otherwise stable changes of COPD. Very small left pleural effusion is suggested. There is no significant right pleural effusion, cardiomegaly, vascular congestion, pneumothorax, or other changes identified. An old healed left posterolateral sixth rib fracture is again noted. INTERVAL PLACEMENT OF AN OROGASTRIC TUBE IN GOOD APPARENT POSITION. IMPROVED INFILTRATE/CONSOLIDATION OF THE INFEROLATERAL LEFT LUNG BASE. OTHERWISE, STABLE CHEST FROM 1128. Xr Chest Portable    Result Date: 2019  Patient MRN: [id number] : 1950 Age:  68 years Gender: Female Order Date: 2019 9:30 PM. Exam: XR CHEST PORTABLE Number of Views: 2 Indication:  Generalized weakness, seizures and rhonchi Comparison: 3/16/2018 Findings: Vascular calcifications thoracic aorta. Interval placement large-bore right-sided central line when compared with the previous study with its distal tip overlying the distal superior vena cava. Airspace disease left lung base. Impression:  1. Findings suspicious for possible left basilar infiltrate/pneumonia and partial atelectasis/scarring. Xr Chest Portable    Result Date: 2019  EXAMINATION: XR CHEST PORTABLE CLINICAL HISTORY: INTUBATION COMPARISONS: 2019, 2140 HOURS, 2018 FINDINGS: Endotracheal tube, 4.7 cm superior to max. Dual-lumen right jugular vein central line with tip in right atrium unchanged. Osseous structures. Cardiopericardial silhouette normal. Lungs hyperexpanded. Ill-defined area of increased opacity left lung base. LEFT LOWER LUNG ATELECTASIS/PNEUMONIA. EMPHYSEMA.     Mri Brain Wo Contrast    Result Date: 2019  Patient MRN: [id number] : 1950 Age:  68 years Gender: Female Order Date: 2019 10:45 AM. Exam: MRI BRAIN WO CONTRAST Number of Views: 811 Indication: Stroke. Comparison: Head CT without contrast 11/29/2019. Contrast dosage: None Findings: There is no evidence of restricted diffusion or acute/subacute cerebrovascular infarction/accident. Pituitary gland and sellar/suprasellar regions are unremarkable. No Chiari malformation. . Hippocampal formations are symmetric in signal intensity and morphology bilaterally. Susceptibility weighted imaging demonstrates no evidence of intraparenchymal hemorrhage. No intrinsic noncontrast T1 shortening. Moderate cerebral volume loss/atrophy. No subfalcine, transtentorial or tonsillar herniation or shift. Normal expected appearance of the visualized T2 flow voids. Thinning of the optic lenses bilaterally, otherwise, optic globes and orbital contents are unremarkable. Large bilateral mastoid air cell effusions. Paranasal sinuses well aerated. There are extensive T2 FLAIR abnormal hyperintensities noted bilateral centrum ovale, periventricular regions, extending in the subcortical and deep white matter of the bilateral frontal, parietal, and occipital lobes. Remote lacunar infarctions bilateral cerebellum. Prominence enlarged spaces bilateral basal ganglia. 1. No evidence of restricted diffusion or acute/subacute cerebrovascular infarction/accident. 2. Moderate cerebral volume loss/atrophy with extensive white matter changes suggestive of sequelae moderately severe small vessel ischemic disease. 3. Large bilateral mastoid air cell effusions. 4. Remote lacunar infarctions bilateral cerebellum.     Us Dup Lower Extremities Bilateral Venous    Result Date: 11/29/2019  VENOUS DUPLEX ULTRASOUND OF THE BILATERAL LOWER EXTREMITY CLINICAL HISTORY:  BILATERAL LEG PAIN, PATIENT WITH DYSPNEA AND ELEVATED D-DIMER, EVALUATE FOR THROMBOPHLEBITIS OR DEEP VENOUS THROMBOSIS IN BOTH LOWER EXTREMITIES COMPARISON:  NONE AVAILABLE TECHNIQUE:  Sonographic imaging of the deep venous system of the bilateral  lower extremity was performed by a registered sonographer and the images were submitted for interpretation. FINDINGS:  The common femoral, superficial femoral, and popliteal veins demonstrate normal color flow, augmentation, and compressibility. No venous duplex ultrasound evidence of DVT in the bilateral lower extremity. There is suboptimal visualization of the left calf veins. The visualized portions of the right calf veins appear patent. NO SONOGRAPHIC EVIDENCE OF DVT WITHIN THE  BILATERAL  LOWER EXTREMITY. IMPRESSION AND SUGGESTION:  1. Acute on chronic hypoxic and hypercapnic respiratory failure worsening steadily with evidence of retained airway secretions and mucous plugging, patient is improving now with improvement of pulmonary infiltrates, oxygenation and ventilation. Continue to wean vent settings as tolerated  2. Bilateral lower lobe opacities probably a combination of progressive atelectasis in addition to gradually increasing pleural effusions, improving  3. Severe aortic stenosis reported by recent echo probably contributing to heart failure picture, with bilateral pleural effusions, responding adequately to diuretic therapy  4. Chronic kidney disease remains stable  5. COPD with acute exacerbation, continuing aggressive therapy with bronchodilators, antibiotics, steroids, and aggressive suctioning  6. Patient presented with acute GI bleed with  blood loss anemia, improved with no active bleeding at this point noted  Patient remains critically ill, requiring invasive ventilatory support, diuresis, aggressive pulmonary toileting, broad-spectrum antibiotics, bronchodilators, etc. continuing with DVT and peptic ulcer disease prophylaxis, recheck x-ray and blood gases in a.m., if continuing to improve we will attempt weaning  I spent 36 minutes providing critical care. This time is excluding time spent performing procedures.       Electronically signed by Arlette Gimenez MD, [facility] on 12/23/2019 at 3:05 PM

## 2019-12-23 NOTE — PLAN OF CARE
Problem: Risk for Impaired Skin Integrity  Goal: Tissue integrity - skin and mucous membranes  Description  Structural intactness and normal physiological function of skin and  mucous membranes. Outcome: Ongoing     Problem: Falls - Risk of:  Goal: Will remain free from falls  Description  Will remain free from falls  Outcome: Ongoing  Goal: Absence of physical injury  Description  Absence of physical injury  Outcome: Ongoing     Problem: Mobility - Impaired:  Goal: Mobility will improve  Description  Therapy evaluation completed. Please see daily notes and/or progress notes for details related to planned treatment interventions, goals and functional abilities.      Outcome: Ongoing     Problem: Nutrition  Goal: Optimal nutrition therapy  Outcome: Ongoing     Problem: IP DRESSING UPPER EXTREMITIES  Goal: LTG - Patient will dress upper body from seated position  Outcome: Ongoing     Problem: Restraint Use - Nonviolent/Non-Self-Destructive Behavior:  Goal: Absence of restraint indications  Description  Absence of restraint indications  Outcome: Ongoing  Goal: Absence of restraint-related injury  Description  Absence of restraint-related injury  Outcome: Ongoing

## 2019-12-23 NOTE — FLOWSHEET NOTE
Remains on vent. Sedated with Propofol. Remains calm  Tube feed at 30 cc hr tolerating well 5 cc residuals. Had 2 soft blackish brown BMs.  Buttocks and inner thighs remain reddened Protective ointment to reddened areas  Pink rash remains scattered over body

## 2019-12-23 NOTE — PROGRESS NOTES
Wound Ostomy Continence Nurse  Consult Note       NAME:  Domi Bradford  MEDICAL RECORD NUMBER:  [id number]  AGE: 68 y.o. GENDER: female  : 1950  TODAY'S DATE:  2019    Subjective   Reason for WOC Nurse Evaluation and Assessment: Fungal rash & Blanchable erythema      Domi Bradford is a 68 y.o. female referred by:   [] Physician  [x] Nursing  [] Other:     Wound Identification:  Wound Type: Fungal Rash & Blanchable erythema  Contributing Factors: decreased mobility, malnutrition and Moisture    Wound History: Patient developed Fungal rash and blanchable erythema to the groin and buttocks/sacrococcygeal areas. Topical antifungal started over the weekend, nursing and patient's family at bedside all confirm the rash is improving. On assessment, fungal rash is resolving, blanchable redness present and some of the damaged skin from the rash is beginning to peel, exposing clean intact skin.   Current Wound Care Treatment:  1) pressure injury prevention interventions 2) topical antifungal - already in use    Patient Goal of Care:  [] Wound Healing  [] Odor Control  [] Palliative Care  [] Pain Control   [x] Other: Pressure injury prevention and moisture management        PAST MEDICAL HISTORY        Diagnosis Date    COPD (chronic obstructive pulmonary disease) ([address]     History of hemodialysis     Hx of blood clots     Hyperlipidemia     Hyperthyroidism        PAST SURGICAL HISTORY    Past Surgical History:   Procedure Laterality Date    CATHETER REMOVAL N/A 2019    REMOVAL OF DIALYSIS CATHETER performed by Kaitlin Campos MD at [place] [address] [place] [facility]CR[facility] NOT  [address] IND N/A 11/15/2017    COLONOSCOPY performed by Brandon Donnelly MD at . [address] ESOPHAGOGASTRODUODENOSCOPY TRANSORAL DIAGNOSTIC N/A 11/15/2017    EGD ESOPHAGOGASTRODUODENOSCOPY WITH SMALL BOWEL BIOPSY performed by Brandon Donnelly MD at [facility] lobes due to Pseudomonas species ([facility] [address]    GI bleed    Acute blood loss anemia    Elevated INR       Measurements:     Incision 12/04/19 Chest Right (Active)   Wound Length (cm) 2 cm 12/13/2019  6:27 PM   Closure Sutures 12/19/2019  9:21 PM   Drainage Amount None 12/19/2019  9:21 PM   Odor None 12/19/2019  9:21 PM   Dressing/Treatment Open to air 12/19/2019  9:21 PM   Dressing Status Intact;Dry;Clean 12/19/2019  9:21 PM   Number of days: 18     Plan   Plan of Care: Incision 12/04/19 Chest Right-Dressing/Treatment: Open to air    Specialty Bed Required : Yes   [x] Low Air Loss   [] Pressure Redistribution  [] Fluid Immersion  [] Bariatric  [] Other:     Current Diet: DIET TUBE FEED CONTINUOUS/CYCLIC NPO; Semi-elemental (Vital AF 1.2);  Orogastric; Continuous; 15; 35; 24  Dietician consult:  Yes    Discharge Plan:  Placement for patient upon discharge: home with support    Patient appropriate for Outpatient [facility] ([place], [place]) Road: N/A    Referrals:  []   [] [facility]  [] Supplies  [] Other    Patient/Caregiver Teaching: Patient's family  Level of patient/caregiver understanding able to:   [] Indicates understanding       [] Needs reinforcement  [] Unsuccessful      [x] Verbal Understanding  [] Demonstrated understanding       [] No evidence of learning  [] Refused teaching         [] N/A       Electronically signed by ANGEL LUIS JohnsonN, RN, CWOCN on 12/23/2019 at 10:59 AM

## 2019-12-23 NOTE — PROGRESS NOTES
This patient has both a [address] document and a Living Will Declaration. A copy of both documents can be located in the [employer] [facility]. The patient named Yuni Palomo as her primary agent.     Damaris AU

## 2019-12-24 NOTE — PROGRESS NOTES
0745: pt resting comfortably. restraints placed d/t weaning off propofol for safety purposes. Pt had bm and was cleaned and turned. 0800: no TF prep for extubation    0856: decreased propofol for neuro check. No abnormal neuro check noted. 1772: propofol turned back on d/t anxiety. 1000: stopped propofol, preparing to extubate. 1130: pt starting to become more awake and following commands. Will wait for RT and extubate. 1140: extubated , placed on 3 l nc, saturation 88-92%, dropping  To 86%. Pt not taking deep breaths. RT to place on high flow oxygen.

## 2019-12-24 NOTE — PLAN OF CARE
Nutrition Problem: Severe malnutrition, In context of chronic illness  Intervention: Food and/or Nutrient Delivery: (If able to start po diet, recommend CORTES with high calorie ONS TID)  Nutritional Goals: initiation of po diet with intake > 50% of meals and supplements.   Wt gain > 100 lb

## 2019-12-24 NOTE — PROGRESS NOTES
rhonchi, mild wheeze  Heart: RRR, normal S1 and S2, no murmurs  GI: Soft, non-distended, +BS  MSK: no edema noted  Pulses: 2+ pulses bilaterally  Skin: warm, dry         Labs:   Recent Labs     12/22/19  0514 12/23/19  0510 12/23/19  0546 12/24/19  0533   WBC 5.5 8.7  --  5.8   HGB 8.4* 8.7* 8.6* 8.1*   HCT 27.1* 28.3*  --  25.9*    228  --  212     Recent Labs     12/22/19  0514 12/23/19  0510 12/23/19  0546 12/24/19  0533   * 143  --  144   K 3.5 3.4  --  3.3*   * 112*  --  112*   CO2 15* 17*  --  18*   BUN 30* 31*  --  33*   CREATININE 2.84* 2.69* 2.2* 2.80*   CALCIUM 7.9* 7.8*  --  7.7*   PHOS 5.3* 4.8  --  6.0*     Recent Labs     12/22/19  0514 12/23/19  0510 12/24/19  0533   AST 15 14 10   ALT 20 19 15   BILITOT <0.2 <0.2 <0.2   ALKPHOS 56 67 56     Recent Labs     12/22/19  0514 12/23/19  0510 12/24/19  0533   INR 1.0 1.0 1.0     No results for input(s): Rocio Joseanuradha in the last 72 hours. Urinalysis:      Lab Results   Component Value Date    NITRU Negative 12/13/2019    WBCUA >100 12/13/2019    BACTERIA Negative 12/13/2019    RBCUA 3-5 12/13/2019    BLOODU SMALL 12/13/2019    SPECGRAV 1.013 12/13/2019    GLUCOSEU Negative 12/13/2019       Radiology:  XR CHEST PORTABLE   Final Result      Interval improvement of bilateral pleural effusions. Trace pleural effusions remain. XR CHEST PORTABLE   Final Result   RESIDUAL PLEURAL EFFUSIONS AND ATELECTASIS RIGHT BASE. OVERALL SLIGHT IMPROVEMENT FROM ONE DAY EARLIER. TUBES AND CATHETERS SATISFACTORY. XR CHEST PORTABLE   Final Result      Stable lines and tubes. Bibasilar opacities and pleural effusions, with the findings at the left lung base appearing slightly improved from prior. XR CHEST PORTABLE   Final Result      Interval lines and tubes as detailed. Stable appearance of the lung bases. Emphysema. XR CHEST PORTABLE   Final Result      1.  NODULAR DENSITY IS NOW REVEALED IN THE LEFT LOWER LOBE. 2BIBASILAR AREAS OF ATELECTASIS, INFILTRATES CONSOLIDATIONS WITH BILATERAL PLEURAL EFFUSIONS. SUPERIMPOSED UPON COPD   . XR CHEST PORTABLE   Final Result      No significant interval change in right lung infiltrate and small bilateral pleural effusions. XR CHEST PORTABLE   Final Result   INCREASED INTERSTITIAL OPACITIES THROUGHOUT THE RIGHT LUNG MOST STRIKING IN THE UPPER LUNG ZONE. PERSISTENT PLEURAL PARENCHYMAL CHANGES AT THE LEFT BASE. FINDINGS REPRESENT A PROGRESSION OF DISEASE WHEN COMPARED TO DECEMBER 13, 2019. XR CHEST PORTABLE   Final Result   Impression:     1. Persistent left basilar airspace disease suspicious for infiltrate/pneumonia. Continued follow-up to complete resolution is recommended to exclude underlying mass or malignancy. 2. Stable, enlarged mediastinal silhouette with thoracic aortic vascular calcifications. IR PICC WO SQ PORT/PUMP > 5 YEARS    (Results Pending)           Assessment/Plan:    68 y.o. female with PMH of COPD with chronic respiratory failure non compliant with BiPAP, CKD4, hyperthyroidism, seizure disorder, remote lacunar CVAs, DVT on warfarin, who was just discharged from the hospital on 12/11 after being managed for acute hypoxic/ hypercapnic respiratory failure, COPD exacerbation, Pseudomonas pneumonia, sent to SNF on IV Zosyn x 2 weeks and today presented with:     # Acute blood loss anemia and GI bleed- stable  - noted to have black,tarry stools at the NH  - Hb was 6.6 on arrival, FOBT was positive, INR-3.8,   - Vit K and FFPs administered,  - Hb is 8.0 s/p transfusion of 2 unit of PRBCs,   - EGD done 12/15 showed multiple small areas of ooze in the pyloric channel and duodenal bulb,   - continue Protonix BID  - GI following     # Pseudomonas PNA  - continue IV Zosyn, end date 12/24 per ID notes from last admission  - still requiring high O2- transferred to the ICU on 12/20 for closer monitoring.  Intubated 12/21 - intensivist to manage vent  - repeat CXR with continued infiltrate     # COPD with chronic hypoxic respiratory failure  - continue duonebs, O2 therapy  - extubated on 12/24     # CKD 4  - stable, monitor renal function     # DVT  - warfarin on hold due to GI bleed  - resume when OK with GI     # Severe protein calorie malnutrition  - encourage po intake     Disposition: Patient sp EGD 12/15 and still on PPI BID. GI following. Transferred to the ICU on 12/20 and intubated 12/21. Anticipated length of stay greater than 48 hours.     Electronically signed by Greer Bright MD on 12/24/2019 at 1:29 PM

## 2019-12-24 NOTE — PROGRESS NOTES
erythema or rash on exposed extremities. DATA:   Recent Labs     12/23/19  0510 12/23/19  0546 12/24/19  0533   WBC 8.7  --  5.8   HGB 8.7* 8.6* 8.1*   HCT 28.3*  --  25.9*   MCV 94.9  --  94.1     --  212     Recent Labs     12/23/19  0510 12/23/19  0546 12/24/19  0533     --  144   K 3.4  --  3.3*   *  --  112*   CO2 17*  --  18*   BUN 31*  --  33*   CREATININE 2.69* 2.2* 2.80*   GLUCOSE 83  --  99   CALCIUM 7.8*  --  7.7*   PROT 5.7*  --  5.3*   LABALBU 2.2*  --  2.2*   BILITOT <0.2  --  <0.2   ALKPHOS 67  --  56   AST 14  --  10   ALT 19  --  15   LABGLOM 17.4* 22* 16.6*   GFRAA 21.1* 27* 20.1*   GLOB 3.5  --  3.1       MV Settings:     Vent Mode: CPAP  Vt Ordered: 400 mL  Rate Set: 16 bmp  FiO2 : 40 %  PEEP/CPAP: 5  Pressure Support: 10 cmH20  Peak Inspiratory Pressure: 19 cmH2O  Mean Airway Pressure: 8.69 cmH20  I:E Ratio: 1:4.00    Recent Labs     12/23/19  0546   PHART 7.325*   FJD4UVY 37   PO2ART 89*   GFJ8DKM 19.0*   BEART -7*   O4EBILHD 96*       O2 Device: Ventilator  O2 Flow Rate (L/min): 60 L/min    DIET TUBE FEED CONTINUOUS/CYCLIC NPO; Semi-elemental (Vital AF 1.2);  Orogastric; Continuous; 15; 35; 24     MEDICATIONS during current hospitalization:    Continuous Infusions:   sodium chloride      propofol Stopped (12/24/19 1000)       Scheduled Meds:   lidocaine  5 mL Intradermal Once    sodium chloride flush  10 mL Intravenous 2 times per day    guaiFENesin  200 mg Oral Q4H    albuterol  2.5 mg Nebulization TID    chlorhexidine  15 mL Mouth/Throat BID    etomidate  10 mg Intravenous Once    miconazole   Topical BID    predniSONE  40 mg Oral Daily    pantoprazole  40 mg Intravenous BID    And    sodium chloride (PF)  10 mL Intravenous BID    amLODIPine  10 mg Oral Daily    atenolol  50 mg Oral BID    atorvastatin  40 mg Oral Nightly    isosorbide mononitrate  60 mg Oral Daily    budesonide  0.5 mg Nebulization BID    sodium chloride flush  10 mL Intravenous 2 HISTORY:  Pneumonia versus atelectasis . COMPARISON: 12/1/2019. TECHNIQUE: ROUTINE. FINDINGS: Since the prior study, endotracheal, orogastric tubes and a right internal jugular approach hemodialysis catheter have been removed. There is increasing infiltrate/consolidation of the left lung base suspicious for bronchopneumonia, with a small pleural effusion. A small right pleural effusion is present, with minimal probable left basilar atelectasis. Hyperinflation and coarsening of the bronchovascular structures consistent with COPD appears otherwise unchanged. There is no pneumothorax, cardiomegaly, vascular congestion, or other significant changes identified. WORSENING MODERATE INFILTRATE/CONSOLIDATION OF THE LEFT LUNG BASE SUSPICIOUS FOR BRONCHOPNEUMONIA. SMALL LEFT AND VERY SMALL RIGHT PLEURAL EFFUSIONS. COPD. Ct Head Wo Contrast    Result Date: 11/29/2019  EXAMINATION:  CT HEAD WO CONTRAST HISTORY:   seizure   altered mental status. TECHNIQUE:  Serial axial images without IV contrast were obtained from the vertex to the foramen magnum. Sagittal and coronal reconstructions. All CT scans at this facility use dose modulation, iterative reconstruction, and/or weight based dosing when appropriate to reduce radiation dose to as low as reasonably achievable. COMPARISON:  4/20/2014 RESULT: Post-operative change:  None. Acute change:   No evidence of an acute infarct or other acute parenchymal process. Hemorrhage:    No evidence of acute intracranial hemorrhage. Mass Lesion / Mass Effect:   There is no evidence of an intracranial mass or extraaxial fluid collection. No significant mass effect. Chronic change:   Scattered patchy foci of low attenuation are present within supratentorial white matter which is a nonspecific finding but likely represents mild to moderate microvascular ischemia. Vascular calcifications. Parenchyma: There is no significant volume loss.   The brain parenchyma is otherwise within normal limits for age. Ventricles: The ventricles are within normal limits of size and configuration for age. Paranasal sinuses and skull base:  Mucosal thickening ethmoid air cells. Mastoid air cells are clear. The skull base is unremarkable. Soft tissues unremarkable. No acute intracranial process. Chronic microvascular ischemic changes, appear worsened from 4/20/2014. Xr Chest Portable    Result Date: 12/24/2019  Portable chest radiograph History: Respiratory distress Technique: AP portable view of the chest obtained. Comparison: Portable chest radiograph from December 23, 2019 Findings: Unchanged position of the endotracheal tube and enteric tube. Atherosclerotic calcification of the thoracic aorta. The cardiomediastinal silhouette is within normal limits. Interval improvement in bilateral pleural effusions. Trace bilateral pleural effusions remain. No pneumothorax. No consolidation. Coursing of the pulmonary interstitium. Bones of the thorax appear intact. Interval improvement of bilateral pleural effusions. Trace pleural effusions remain. Xr Chest Portable    Result Date: 12/23/2019  EXAMINATION: XR CHEST PORTABLE REASON FOR EXAM: Bilateral atelectasis and pleural effusions FINDINGS: Endotracheal tube and NG tube appear satisfactory and unchanged. Opacification at the bases continues to improve. Decreasing pleural effusion on the left. Atelectasis and residual right pleural effusion not significantly changed. Increased interstitial markings in the central and upper lung fields particularly on the right side likely related to technique and overlying tubing. No new developing infiltrate or vascular congestion. RESIDUAL PLEURAL EFFUSIONS AND ATELECTASIS RIGHT BASE. OVERALL SLIGHT IMPROVEMENT FROM ONE DAY EARLIER. TUBES AND CATHETERS SATISFACTORY.      Xr Chest Portable    Result Date: 12/22/2019  XR CHEST PORTABLE Exam Date/Time:  12/22/2019 6:00 AM Clinical History:   atelectasis Comparison: unchanged Pulmonary vascular is attenuated, lungs are hyperinflated. Nodular density left lower lobe measuring 1.5 cm. There are bibasilar areas of atelectasis, infiltrates consolidations with bilateral pleural effusions. The left pleural effusion has decreased compared to prior examination. Pneumothoraces. Old healed left sixth rib fracture. 1. NODULAR DENSITY IS NOW REVEALED IN THE LEFT LOWER LOBE. 2BIBASILAR AREAS OF ATELECTASIS, INFILTRATES CONSOLIDATIONS WITH BILATERAL PLEURAL EFFUSIONS. SUPERIMPOSED UPON COPD . Xr Chest Portable    Result Date: 12/20/2019  Portable chest radiograph History: Respiratory failure Technique: AP portable view of the chest obtained. Comparison: Portable chest radiograph from December 16, 2019 Findings: Atherosclerotic calcification of the thoracic aorta. Suboptimal inspiration. No significant interval change in bilateral pleural effusions, left greater than right. No significant interval change of patchy and linear opacities throughout the right lung. No pneumothorax. Bones of the thorax appear intact. No significant interval change in right lung infiltrate and small bilateral pleural effusions. Xr Chest Portable    Result Date: 12/16/2019  EXAMINATION: XR CHEST PORTABLE DATE AND TIME:12/16/2019 3:00 PM CLINICAL HISTORY: Shortness of breath   sob  COMPARISONS: December 13, 2019  FINDINGS: Left effusion with compressive atelectasis. Fusion may be partially loculated. New infiltrate throughout the right lung most extensive in the right upper lung zone. Possibility of a slightly atypical interstitial edema presentation is not excluded. Clinical correlation suggested. INCREASED INTERSTITIAL OPACITIES THROUGHOUT THE RIGHT LUNG MOST STRIKING IN THE UPPER LUNG ZONE. PERSISTENT PLEURAL PARENCHYMAL CHANGES AT THE LEFT BASE.  FINDINGS REPRESENT A PROGRESSION OF DISEASE WHEN COMPARED TO DECEMBER 13, .    Xr Chest Portable    Result Date: 2019  Patient MRN: [id number] : 1950 Age:  68 years Gender: Female Order Date: 2019 2:15 PM. Exam: XR CHEST PORTABLE Number of Views: 1 Indication:  Weakness and chest pain Comparison: 12/10/2019 Findings: Stable, enlarged cardiomediastinal silhouette. Left basilar airspace disease. Vascular calcifications thoracic aorta. No pneumothorax. Impression:  1. Persistent left basilar airspace disease suspicious for infiltrate/pneumonia. Continued follow-up to complete resolution is recommended to exclude underlying mass or malignancy. 2. Stable, enlarged mediastinal silhouette with thoracic aortic vascular calcifications. Xr Chest Portable    Result Date: 2019  XR CHEST PORTABLE : 2019 CLINICAL HISTORY:  Respiratory failure on a vent . COMPARISON: 2019. TECHNIQUE: A portable upright AP radiograph of the chest was obtained. FINDINGS: There has been interval placement of an orogastric tube with its tip overlying the body of the stomach, likely in good position. An endotracheal tube remains in good apparent position. A right internal jugular approach hemodialysis catheter appears unchanged. There is significantly improved consolidation of the inferolateral left lung base, with otherwise stable changes of COPD. Very small left pleural effusion is suggested. There is no significant right pleural effusion, cardiomegaly, vascular congestion, pneumothorax, or other changes identified. An old healed left posterolateral sixth rib fracture is again noted. INTERVAL PLACEMENT OF AN OROGASTRIC TUBE IN GOOD APPARENT POSITION. IMPROVED INFILTRATE/CONSOLIDATION OF THE INFEROLATERAL LEFT LUNG BASE. OTHERWISE, STABLE CHEST FROM 1128.      Xr Chest Portable    Result Date: 2019  Patient MRN: [id number] : 1950 Age:  68 years Gender: Female Order Date: 2019 9:30 PM. Exam: XR CHEST PORTABLE Number of Views: 2 Indication:  Generalized weakness, seizures and rhonchi Comparison: 3/16/2018 Findings: Vascular calcifications thoracic aorta. Interval placement large-bore right-sided central line when compared with the previous study with its distal tip overlying the distal superior vena cava. Airspace disease left lung base. Impression:  1. Findings suspicious for possible left basilar infiltrate/pneumonia and partial atelectasis/scarring. Xr Chest Portable    Result Date: 2019  EXAMINATION: XR CHEST PORTABLE CLINICAL HISTORY: INTUBATION COMPARISONS: 2019, 2140 HOURS, 2018 FINDINGS: Endotracheal tube, 4.7 cm superior to max. Dual-lumen right jugular vein central line with tip in right atrium unchanged. Osseous structures. Cardiopericardial silhouette normal. Lungs hyperexpanded. Ill-defined area of increased opacity left lung base. LEFT LOWER LUNG ATELECTASIS/PNEUMONIA. EMPHYSEMA. Mri Brain Wo Contrast    Result Date: 2019  Patient MRN: [id number] : 1950 Age:  68 years Gender: Female Order Date: 2019 10:45 AM. Exam: MRI BRAIN WO CONTRAST Number of Views: 316 Indication:  Stroke. Comparison: Head CT without contrast 2019. Contrast dosage: None Findings: There is no evidence of restricted diffusion or acute/subacute cerebrovascular infarction/accident. Pituitary gland and sellar/suprasellar regions are unremarkable. No Chiari malformation. . Hippocampal formations are symmetric in signal intensity and morphology bilaterally. Susceptibility weighted imaging demonstrates no evidence of intraparenchymal hemorrhage. No intrinsic noncontrast T1 shortening. Moderate cerebral volume loss/atrophy. No subfalcine, transtentorial or tonsillar herniation or shift. Normal expected appearance of the visualized T2 flow voids. Thinning of the optic lenses bilaterally, otherwise, optic globes and orbital contents are unremarkable. Large bilateral mastoid air cell effusions.  Paranasal sinuses well aerated. There are extensive T2 FLAIR abnormal hyperintensities noted bilateral centrum ovale, periventricular regions, extending in the subcortical and deep white matter of the bilateral frontal, parietal, and occipital lobes. Remote lacunar infarctions bilateral cerebellum. Prominence enlarged spaces bilateral basal ganglia. 1. No evidence of restricted diffusion or acute/subacute cerebrovascular infarction/accident. 2. Moderate cerebral volume loss/atrophy with extensive white matter changes suggestive of sequelae moderately severe small vessel ischemic disease. 3. Large bilateral mastoid air cell effusions. 4. Remote lacunar infarctions bilateral cerebellum. Us Dup Lower Extremities Bilateral Venous    Result Date: 11/29/2019  VENOUS DUPLEX ULTRASOUND OF THE BILATERAL LOWER EXTREMITY CLINICAL HISTORY:  BILATERAL LEG PAIN, PATIENT WITH DYSPNEA AND ELEVATED D-DIMER, EVALUATE FOR THROMBOPHLEBITIS OR DEEP VENOUS THROMBOSIS IN BOTH LOWER EXTREMITIES COMPARISON:  NONE AVAILABLE TECHNIQUE:  Sonographic imaging of the deep venous system of the bilateral  lower extremity was performed by a registered sonographer and the images were submitted for interpretation. FINDINGS:  The common femoral, superficial femoral, and popliteal veins demonstrate normal color flow, augmentation, and compressibility. No venous duplex ultrasound evidence of DVT in the bilateral lower extremity. There is suboptimal visualization of the left calf veins. The visualized portions of the right calf veins appear patent. NO SONOGRAPHIC EVIDENCE OF DVT WITHIN THE  BILATERAL  LOWER EXTREMITY. IMPRESSION AND SUGGESTION:  1. Acute on chronic hypoxic and hypercapnic respiratory failure improved with current measures, tolerating spontaneous breathing trial today, extubated and doing well so far  2.  Bilateral lower lobe opacities probably a combination of progressive atelectasis in addition to gradually increasing pleural effusions, improving  3. Severe aortic stenosis reported by recent echo probably contributing to heart failure picture, with bilateral pleural effusions, responding adequately to diuretic therapy  4. Chronic kidney disease remains stable, with diuretic therapy so far  5. COPD with acute exacerbation, continuing aggressive therapy with bronchodilators, antibiotics, steroids, and aggressive suctioning  6. Patient presented with acute GI bleed with  blood loss anemia, improved with no active bleeding at this point noted  Continue all current treatment plans as before, increase activity as tolerated, introduced oral intake as tolerated, aggressive pulmonary toileting to continue post extubation. I spent 36 minutes providing critical care. This time is excluding time spent performing procedures.       Electronically signed by Arlette Gimenez MD, FCCP on 12/24/2019 at 12:35 PM

## 2019-12-24 NOTE — PROGRESS NOTES
(45.4 kg)(12/21 Bed scale- doubt accuracy)  · Admission Body Wt: 91 lb (41.3 kg)(12/13 Bed scale)  · Usual Body Wt: 95 lb (43.1 kg)(11/12 at [facility])  · % Weight Change:  ,  4.2% (5 lbs.) x 1 month  · Ideal Body Wt: 125 lb (56.7 kg), % Ideal Body 73%  · BMI Classification: BMI <18.5 Underweight    Nutrition Interventions:   (If able to start po diet, recommend CORTES with high calorie ONS TID)  Continued Inpatient Monitoring, Education not appropriate at this time    Nutrition Evaluation:   · Evaluation: Goals set(New goal with extubation)   · Goals: initiation of po diet with intake > 50% of meals and supplements.   Wt gain > 100 lb    · Monitoring: Nutrition Progression, Weight, Pertinent Labs      Electronically signed by Jah Fuentes RD, LUDA on 12/24/19 at 3:13 PM

## 2019-12-24 NOTE — CARE COORDINATION
QUALITY ROUNDS COMPLETE PLAN TO EXTUBATE IF POSSIBLE. RN AWARE WE WILL NEED PT/TEVALS TO START PRECERT FOR AUTUMN.  WILL UPDATE LSW

## 2019-12-25 NOTE — PROGRESS NOTES
CRITICAL CARE PROGRESS NOTES    PATIENT NAME: Deepa Vanessa  MRN: [id number]  SERVICE DATE:  December 25, 2019   SERVICE TIME:  11:49 AM      PRIMARY SERVICE: Critical care medicine    CHIEF COMPLAINTS: Sedated, on the vent    INTERVAL HPI: Patient seen and examined at bedside, Interval Notes, orders reviewed. Discussed on multidisciplinary rounds  Patient was extubated yesterday, has required high flow oxygen during the day, BiPAP at night, desaturated off BiPAP this morning had to go back on that, still with very harsh rhonchi and coarse breath sounds consistent with significant airway secretions and poor ability to expectorate. And taken her off BiPAP again now to allow her to expectorate and work on that with aggressive pulmonary toileting. Discussed with the respiratory therapy as well as nursing. OBJECTIVE    Body mass index is 17.47 kg/m². PHYSICAL EXAM:  Vitals:  BP (!) 108/52   Pulse 71   Temp 98.2 °F (36.8 °C) (Oral)   Resp 18   Ht 5' 5\" (1.651 m)   Wt 105 lb (47.6 kg)   SpO2 97%   BMI 17.47 kg/m²   General: Patient is currently alert awake tachypneic at rest even with BiPAP on with coarse audible breath sounds  Head: Atraumatic , Normocephalic   Eyes: PERRL. No icteric sclera. No conjunctival injection. No discharge   ENT: No nasal  discharge. Pharynx clear. Neck:  Trachea midline. No thyromegaly, no JVD, No cervical adenopathy. Chest : Adequate spontaneous effort, symmetric bilateral excursions  Lung : Decreased breath sounds bilaterally, coarse rhonchi bilaterally with coarse crackles in the bases  Heart[de-identified] Regular rhythm and rate. No mumur ,  Rub or gallop  ABD: Benign. Non-tender. Non-distended. No masses or organmegaly. Normal bowel sounds. EXT: Positive pitting edema both upper and lower extremities, improved some since yesterday, No Cyanosis No clubbing  Neuro: no focal weakness  Skin: Warm and dry. No erythema or rash on exposed extremities.       DATA:   Recent Labs 12/24/19  0533 12/25/19  0530   WBC 5.8 6.4   HGB 8.1* 8.3*   HCT 25.9* 26.6*   MCV 94.1 94.8    250     Recent Labs     12/24/19  0533 12/25/19  0530    147*   K 3.3* 3.9   * 114*   CO2 18* 16*   BUN 33* 37*   CREATININE 2.80* 2.81*   GLUCOSE 99 71   CALCIUM 7.7* 8.1*   PROT 5.3* 5.9*   LABALBU 2.2* 2.3*   BILITOT <0.2 <0.2   ALKPHOS 56 62   AST 10 12   ALT 15 17   LABGLOM 16.6* 16.5*   GFRAA 20.1* 20.0*   GLOB 3.1 3.6*       MV Settings:     Vent Mode: CPAP  Vt Ordered: 400 mL  Rate Set: 16 bmp  FiO2 : 55 %  PEEP/CPAP: 5  Pressure Support: 10 cmH20  Peak Inspiratory Pressure: 19 cmH2O  Mean Airway Pressure: 8.69 cmH20  I:E Ratio: 1:4.00    Recent Labs     12/23/19  0546   PHART 7.325*   [id number] 37   PO2ART 89*   JAZ6TJH 19.0*   BEART -7*   [id number] 96*       O2 Device: Heated high flow cannula  O2 Flow Rate (L/min): 40 L/min    DIET TUBE FEED CONTINUOUS/CYCLIC NPO; Semi-elemental (Vital AF 1.2);  Orogastric; Continuous; 15; 35; 24     MEDICATIONS during current hospitalization:    Continuous Infusions:   sodium chloride      propofol Stopped (12/24/19 1000)       Scheduled Meds:   lidocaine  5 mL Intradermal Once    sodium chloride flush  10 mL Intravenous 2 times per day    guaiFENesin  200 mg Oral Q4H    albuterol  2.5 mg Nebulization TID    chlorhexidine  15 mL Mouth/Throat BID    etomidate  10 mg Intravenous Once    miconazole   Topical BID    predniSONE  40 mg Oral Daily    pantoprazole  40 mg Intravenous BID    And    sodium chloride (PF)  10 mL Intravenous BID    amLODIPine  10 mg Oral Daily    atenolol  50 mg Oral BID    atorvastatin  40 mg Oral Nightly    isosorbide mononitrate  60 mg Oral Daily    budesonide  0.5 mg Nebulization BID    sodium chloride flush  10 mL Intravenous 2 times per day    sodium chloride (PF)  10 mL Intravenous Once    piperacillin-tazobactam  2.25 g Intravenous Q8H    sodium chloride  250 mL Intravenous Once    levetiracetam  500 mg additional images of pelvis are unremarkable. MODERATELY DISTENDED GALLBLADDER WITH MODERATE WALL THICKENING, WHICH MAY BE ACUTE CHOLECYSTITIS OR REACTIVE. APPROXIMATELY 5.5 CM FUSIFORM INFRARENAL ABDOMINAL AORTIC ANEURYSM. MILD TO MODERATE ATELECTASIS/CONSOLIDATION OF THE LEFT LUNG BASE WITH A SMALL PLEURAL EFFUSION. BRONCHOPNEUMONIA SHOULD BE EXCLUDED CLINICALLY NASOGASTRIC, RECTAL TUBES AND A CUNNINGHAM CATHETER IN GOOD POSITION. Xr Chest Standard (2 Vw)    Result Date: 12/10/2019  XR CHEST (2 VW): 12/10/2019 CLINICAL HISTORY:  Pneumonia . COMPARISON: 12/7/2019. Upright AP and lateral radiographs of the chest were obtained. FINDINGS: Small pleural effusions and infiltrate/consolidation of the lung bases, greater on the left appears unchanged. Hyperinflation and coarsening of the bronchovascular structures consistent with COPD appears otherwise unchanged. There is no cardiomegaly, vascular congestion, pneumothorax, or displaced fractures identified. STABLE CHEST COMPARED TO 12/7/2019. SMALL PLEURAL EFFUSIONS AND BIBASILAR INFILTRATE/CONSOLIDATION, GREATER ON THE LEFT. COPD. Xr Chest Standard (2 Vw)    Result Date: 12/7/2019  EXAMINATION: XR CHEST (2 VW) CLINICAL HISTORY: PNEUMONIA COMPARISONS: DECEMBER 5, 2019, DECEMBER 1, 2019 FINDINGS: Osseous structures intact. Cardiopericardial silhouette normal. Aorta calcified and tortuous. Right lung clear and hyperexpanded. Blunting left costophrenic angle, with an area of increased opacity left lower lobe obscuring left lower lung, diaphragm, left cardiac silhouette, again identified, and unchanged. NO INTERVAL CHANGE, LEFT PLEURAL EFFUSION, AND LEFT LOWER LOBE PNEUMONIA. EMPHYSEMA. Xr Chest Standard (2 Vw)    Result Date: 12/5/2019  XR CHEST (2 VW) : 12/5/2019 CLINICAL HISTORY:  Pneumonia versus atelectasis . COMPARISON: 12/1/2019. TECHNIQUE: ROUTINE.  FINDINGS: Since the prior study, endotracheal, orogastric tubes and a right internal jugular approach hemodialysis catheter have been removed. There is increasing infiltrate/consolidation of the left lung base suspicious for bronchopneumonia, with a small pleural effusion. A small right pleural effusion is present, with minimal probable left basilar atelectasis. Hyperinflation and coarsening of the bronchovascular structures consistent with COPD appears otherwise unchanged. There is no pneumothorax, cardiomegaly, vascular congestion, or other significant changes identified. WORSENING MODERATE INFILTRATE/CONSOLIDATION OF THE LEFT LUNG BASE SUSPICIOUS FOR BRONCHOPNEUMONIA. SMALL LEFT AND VERY SMALL RIGHT PLEURAL EFFUSIONS. COPD. Ct Head Wo Contrast    Result Date: 11/29/2019  EXAMINATION:  CT HEAD WO CONTRAST HISTORY:   seizure   altered mental status. TECHNIQUE:  Serial axial images without IV contrast were obtained from the vertex to the foramen magnum. Sagittal and coronal reconstructions. All CT scans at this facility use dose modulation, iterative reconstruction, and/or weight based dosing when appropriate to reduce radiation dose to as low as reasonably achievable. COMPARISON:  4/20/2014 RESULT: Post-operative change:  None. Acute change:   No evidence of an acute infarct or other acute parenchymal process. Hemorrhage:    No evidence of acute intracranial hemorrhage. Mass Lesion / Mass Effect:   There is no evidence of an intracranial mass or extraaxial fluid collection. No significant mass effect. Chronic change:   Scattered patchy foci of low attenuation are present within supratentorial white matter which is a nonspecific finding but likely represents mild to moderate microvascular ischemia. Vascular calcifications. Parenchyma: There is no significant volume loss. The brain parenchyma is otherwise within normal limits for age. Ventricles: The ventricles are within normal limits of size and configuration for age. Paranasal sinuses and skull base:  Mucosal thickening ethmoid air cells.   Mastoid air cells are clear. The skull base is unremarkable. Soft tissues unremarkable. No acute intracranial process. Chronic microvascular ischemic changes, appear worsened from 4/20/2014. Xr Chest Portable    Result Date: 12/24/2019  Portable chest radiograph History: Respiratory distress Technique: AP portable view of the chest obtained. Comparison: Portable chest radiograph from December 23, 2019 Findings: Unchanged position of the endotracheal tube and enteric tube. Atherosclerotic calcification of the thoracic aorta. The cardiomediastinal silhouette is within normal limits. Interval improvement in bilateral pleural effusions. Trace bilateral pleural effusions remain. No pneumothorax. No consolidation. Coursing of the pulmonary interstitium. Bones of the thorax appear intact. Interval improvement of bilateral pleural effusions. Trace pleural effusions remain. Xr Chest Portable    Result Date: 12/23/2019  EXAMINATION: XR CHEST PORTABLE REASON FOR EXAM: Bilateral atelectasis and pleural effusions FINDINGS: Endotracheal tube and NG tube appear satisfactory and unchanged. Opacification at the bases continues to improve. Decreasing pleural effusion on the left. Atelectasis and residual right pleural effusion not significantly changed. Increased interstitial markings in the central and upper lung fields particularly on the right side likely related to technique and overlying tubing. No new developing infiltrate or vascular congestion. RESIDUAL PLEURAL EFFUSIONS AND ATELECTASIS RIGHT BASE. OVERALL SLIGHT IMPROVEMENT FROM ONE DAY EARLIER. TUBES AND CATHETERS SATISFACTORY. Xr Chest Portable    Result Date: 12/22/2019  XR CHEST PORTABLE Exam Date/Time:  12/22/2019 6:00 AM Clinical History:   atelectasis Comparison:  12/21/2019  RESULT: Lines, tubes, and devices:  Endotracheal tube, unchanged. Gastric decompression tube with tip below the diaphragm and out of field of view, grossly unchanged.  Lungs and pleura:  Hyperinflated lungs with flattening of the diaphragms and paucity of lung markings consistent with emphysema. Bibasilar opacities and pleural effusions, with the right lung similar to prior, and the left lung appearing slightly improved from prior. . No pneumothorax. Cardiomediastinal silhouette:  Stable cardiomediastinal silhouette. Other:  No acute osseous findings. Remote left-sided rib fractures. Stable lines and tubes. Bibasilar opacities and pleural effusions, with the findings at the left lung base appearing slightly improved from prior. Xr Chest Portable    Result Date: 12/21/2019  XR CHEST PORTABLE Exam Date/Time:  12/21/2019 12:15 PM Clinical History:   post intubation Comparison:  12/21/2019 0448  RESULT: Lines, tubes, and devices:  Interval endotracheal intubation with tip approximately 7 cm above the max. Interval placement of gastric decompression tube with tip near the level of the diaphragm. Consider further advancement. Lungs and pleura:  Hyperinflated lungs with flattening of the diaphragms and paucity of lung markings consistent with emphysema. Bibasilar opacities and pleural effusions, overall similar to prior. Lung nodule described on recent chest radiograph is not well-visualized on this study. . No pneumothorax. Normal pulmonary vascular pattern. Cardiomediastinal silhouette:  Stable cardiomediastinal silhouette. Other:  No acute osseous findings. Remote left-sided rib fractures. Interval lines and tubes as detailed. Stable appearance of the lung bases. Emphysema. Xr Chest Portable    Result Date: 12/21/2019  EXAMINATION: AP erect chest  CLINICAL HISTORY: Respiratory failure COMPARISONS: December 20, 2019  FINDINGS: Two views of the chest are submitted. The cardiac silhouette is unchanged Pulmonary vascular is attenuated, lungs are hyperinflated. Nodular density left lower lobe measuring 1.5 cm.  There are bibasilar areas of atelectasis, infiltrates consolidations with bilateral pleural effusions. The left pleural effusion has decreased compared to prior examination. Pneumothoraces. Old healed left sixth rib fracture. 1. NODULAR DENSITY IS NOW REVEALED IN THE LEFT LOWER LOBE. 2BIBASILAR AREAS OF ATELECTASIS, INFILTRATES CONSOLIDATIONS WITH BILATERAL PLEURAL EFFUSIONS. SUPERIMPOSED UPON COPD . Xr Chest Portable    Result Date: 2019  Portable chest radiograph History: Respiratory failure Technique: AP portable view of the chest obtained. Comparison: Portable chest radiograph from 2019 Findings: Atherosclerotic calcification of the thoracic aorta. Suboptimal inspiration. No significant interval change in bilateral pleural effusions, left greater than right. No significant interval change of patchy and linear opacities throughout the right lung. No pneumothorax. Bones of the thorax appear intact. No significant interval change in right lung infiltrate and small bilateral pleural effusions. Xr Chest Portable    Result Date: 2019  EXAMINATION: XR CHEST PORTABLE DATE AND TIME:2019 3:00 PM CLINICAL HISTORY: Shortness of breath   sob  COMPARISONS: 2019  FINDINGS: Left effusion with compressive atelectasis. Fusion may be partially loculated. New infiltrate throughout the right lung most extensive in the right upper lung zone. Possibility of a slightly atypical interstitial edema presentation is not excluded. Clinical correlation suggested. INCREASED INTERSTITIAL OPACITIES THROUGHOUT THE RIGHT LUNG MOST STRIKING IN THE UPPER LUNG ZONE. PERSISTENT PLEURAL PARENCHYMAL CHANGES AT THE LEFT BASE. FINDINGS REPRESENT A PROGRESSION OF DISEASE WHEN COMPARED TO 2019.     Xr Chest Portable    Result Date: 2019  Patient MRN: [id number] : 1950 Age:  68 years Gender: Female Order Date: 2019 2:15 PM. Exam: XR CHEST PORTABLE Number of Views: 1 Indication:  Weakness and chest pain Comparison: 12/10/2019 Findings: Stable, enlarged cardiomediastinal silhouette. Left basilar airspace disease. Vascular calcifications thoracic aorta. No pneumothorax. Impression:  1. Persistent left basilar airspace disease suspicious for infiltrate/pneumonia. Continued follow-up to complete resolution is recommended to exclude underlying mass or malignancy. 2. Stable, enlarged mediastinal silhouette with thoracic aortic vascular calcifications. Xr Chest Portable    Result Date: 2019  XR CHEST PORTABLE : 2019 CLINICAL HISTORY:  Respiratory failure on a vent . COMPARISON: 2019. TECHNIQUE: A portable upright AP radiograph of the chest was obtained. FINDINGS: There has been interval placement of an orogastric tube with its tip overlying the body of the stomach, likely in good position. An endotracheal tube remains in good apparent position. A right internal jugular approach hemodialysis catheter appears unchanged. There is significantly improved consolidation of the inferolateral left lung base, with otherwise stable changes of COPD. Very small left pleural effusion is suggested. There is no significant right pleural effusion, cardiomegaly, vascular congestion, pneumothorax, or other changes identified. An old healed left posterolateral sixth rib fracture is again noted. INTERVAL PLACEMENT OF AN OROGASTRIC TUBE IN GOOD APPARENT POSITION. IMPROVED INFILTRATE/CONSOLIDATION OF THE INFEROLATERAL LEFT LUNG BASE. OTHERWISE, STABLE CHEST FROM 1128. Xr Chest Portable    Result Date: 2019  Patient MRN: [id number] : 1950 Age:  68 years Gender: Female Order Date: 2019 9:30 PM. Exam: XR CHEST PORTABLE Number of Views: 2 Indication:  Generalized weakness, seizures and rhonchi Comparison: 3/16/2018 Findings: Vascular calcifications thoracic aorta.  Interval placement large-bore right-sided central line when compared with the previous study with its distal tip overlying the distal superior vena cava. Airspace disease left lung base. Impression:  1. Findings suspicious for possible left basilar infiltrate/pneumonia and partial atelectasis/scarring. Xr Chest Portable    Result Date: 2019  EXAMINATION: XR CHEST PORTABLE CLINICAL HISTORY: INTUBATION COMPARISONS: 2019, 2140 HOURS, 2018 FINDINGS: Endotracheal tube, 4.7 cm superior to max. Dual-lumen right jugular vein central line with tip in right atrium unchanged. Osseous structures. Cardiopericardial silhouette normal. Lungs hyperexpanded. Ill-defined area of increased opacity left lung base. LEFT LOWER LUNG ATELECTASIS/PNEUMONIA. EMPHYSEMA. Mri Brain Wo Contrast    Result Date: 2019  Patient MRN: [id number] : 1950 Age:  68 years Gender: Female Order Date: 2019 10:45 AM. Exam: MRI BRAIN WO CONTRAST Number of Views: 666 Indication:  Stroke. Comparison: Head CT without contrast 2019. Contrast dosage: None Findings: There is no evidence of restricted diffusion or acute/subacute cerebrovascular infarction/accident. Pituitary gland and sellar/suprasellar regions are unremarkable. No Chiari malformation. . Hippocampal formations are symmetric in signal intensity and morphology bilaterally. Susceptibility weighted imaging demonstrates no evidence of intraparenchymal hemorrhage. No intrinsic noncontrast T1 shortening. Moderate cerebral volume loss/atrophy. No subfalcine, transtentorial or tonsillar herniation or shift. Normal expected appearance of the visualized T2 flow voids. Thinning of the optic lenses bilaterally, otherwise, optic globes and orbital contents are unremarkable. Large bilateral mastoid air cell effusions. Paranasal sinuses well aerated.  There are extensive T2 FLAIR abnormal hyperintensities noted bilateral centrum ovale, periventricular regions, extending in the subcortical and deep white matter of the bilateral frontal, parietal, and occipital lobes. Remote lacunar infarctions bilateral cerebellum. Prominence enlarged spaces bilateral basal ganglia. 1. No evidence of restricted diffusion or acute/subacute cerebrovascular infarction/accident. 2. Moderate cerebral volume loss/atrophy with extensive white matter changes suggestive of sequelae moderately severe small vessel ischemic disease. 3. Large bilateral mastoid air cell effusions. 4. Remote lacunar infarctions bilateral cerebellum. Us Dup Lower Extremities Bilateral Venous    Result Date: 11/29/2019  VENOUS DUPLEX ULTRASOUND OF THE BILATERAL LOWER EXTREMITY CLINICAL HISTORY:  BILATERAL LEG PAIN, PATIENT WITH DYSPNEA AND ELEVATED D-DIMER, EVALUATE FOR THROMBOPHLEBITIS OR DEEP VENOUS THROMBOSIS IN BOTH LOWER EXTREMITIES COMPARISON:  NONE AVAILABLE TECHNIQUE:  Sonographic imaging of the deep venous system of the bilateral  lower extremity was performed by a registered sonographer and the images were submitted for interpretation. FINDINGS:  The common femoral, superficial femoral, and popliteal veins demonstrate normal color flow, augmentation, and compressibility. No venous duplex ultrasound evidence of DVT in the bilateral lower extremity. There is suboptimal visualization of the left calf veins. The visualized portions of the right calf veins appear patent. NO SONOGRAPHIC EVIDENCE OF DVT WITHIN THE  BILATERAL  LOWER EXTREMITY. IMPRESSION AND SUGGESTION:  1. Acute on chronic hypoxic and hypercapnic respiratory failure improved and extubated yesterday but showing recurrent respiratory compromise due to retained secretions and debility, may need reintubation and consideration for tracheostomy, will discuss with patient and family  2. Bilateral lower lobe opacities probably a combination of progressive atelectasis in addition to gradually increasing pleural effusions, improving  3.  Severe aortic stenosis reported by recent echo probably contributing to heart failure picture, with bilateral pleural effusions, responding adequately to diuretic therapy, good diuresis with Lasix yesterday  4. Hypokalemia improved with replacement  5. Chronic kidney disease remains stable, with diuretic therapy so far  6. COPD with acute exacerbation, continuing aggressive therapy with bronchodilators, antibiotics, steroids, and aggressive suctioning  7. Patient presented with acute GI bleed with  blood loss anemia, improved with no active bleeding at this point noted  Continue all current treatment plans as before, aggressive pulmonary toileting, noninvasive ventilatory support, high flow oxygen, will discuss reintubation and tracheostomy with patient and family as she is likely to progress in that direction  I spent 38 minutes providing critical care. This time is excluding time spent performing procedures.       Electronically signed by Robert Pulido MD, FCCP on 12/25/2019 at 11:49 AM

## 2019-12-25 NOTE — PROGRESS NOTES
Sedation - Patient awakens with eye opening and eye contact, but not sustained       Therapy Time  SLP Individual Minutes  Time In: 1230  Time Out: Jan Ramirez 79  Minutes: 15              Signature: Electronically signed by ADRIENNE Latham on 12/25/2019 at 1:09 PM

## 2019-12-25 NOTE — PROGRESS NOTES
Pt resting comfortably on bipap, breathing even unlabored,turned and repositioned for comfort. Bishop patent for clear yellow urine. A/O to 3 at this time.

## 2019-12-26 NOTE — PLAN OF CARE
See OT evaluation for all goals and OT POC.  Electronically signed by Amanda Wynne OTR/L on 12/26/2019 at 12:46 PM

## 2019-12-26 NOTE — PROGRESS NOTES
WFL  Left Hand AROM (degrees)  Left Hand AROM: WFL  RUE AROM (degrees)  RUE AROM : WFL  Right Hand AROM (degrees)  Right Hand AROM: WFL    Strength:  LUE Strength  Gross LUE Strength: [facility]  L Hand General: 3+/5  LUE Strength Comment: 3+/5 all planes  RUE Strength  Gross RUE Strength: Exceptions to [facility]  R Hand General: 3+/5  RUE Strength Comment: 3+/5 all planes    Coordination, Tone, Quality of Movement: Tone RUE  RUE Tone: Normotonic  Tone LUE  LUE Tone: Normotonic  Coordination  Movements Are Fluid And Coordinated: Yes    Hand Dominance:  Hand Dominance  Hand Dominance: Right    ADL Status:  ADL  Feeding: Setup  Grooming: Setup  UE Bathing: Setup  LE Bathing: Minimal assistance  UE Dressing: Setup  LE Dressing: Moderate assistance  Toileting: Moderate assistance  Additional Comments: Simulated ADLs as above. Pt. able to don socks but O2 sats drop to 83% and requires extended time to return to < 90%  Toilet Transfers  Toilet Transfer: Unable to assess  Toilet Transfers Comments: Anticipate min A based on other mobility       Functional Mobility:  Functional Mobility  Functional Mobility Comments: Unable to safely take any steps  Transfers  Sit to stand: Minimal assistance  Stand to sit: Minimal assistance  Transfer Comments: Increased effort    Bed Mobility  Bed mobility  Rolling to Right: Stand by assistance  Supine to Sit: Stand by assistance  Sit to Supine: Minimal assistance  Comment: Increased time, significant effort. Pt. sat unsupported at EOB approximately 10 minutes with min difficulty maintaining O2 sats following exertion.      Seated and Standing Balance:  Balance  Sitting Balance: Supervision  Standing Balance: Contact guard assistance    Functional Endurance:  Activity Tolerance  Activity Tolerance: Patient Tolerated treatment well  Activity Tolerance: Limited by O2 sats    D/C Recommendations:  OT D/C RECOMMENDATIONS  REQUIRES OT FOLLOW UP: Yes    Equipment Recommendations:  OT Mod I in UB ADLs   - Be Mod I in LB ADLs  - Be Mod I in ADL transfers without LOB  - Be Mod I in toileting tasks  - Improve B UE strength and endurance to 4/5 in order to participate in self-care activities as projected. - Access appropriate D/C site with as few architectural barriers as possible. - Sequence self-care tasks with no verbal cues for safety    Patient Goal: Patient goals : \"I want to move better\"     Discussed and agreed upon: Yes Comments:     Therapy Time:   OT Individual Minutes  Time In: 1148  Time Out: 1206  Minutes: 18    Eval: 18 minutes     Electronically signed by:     ALDO Coleman  12/26/2019, 12:44 PM

## 2019-12-26 NOTE — PROGRESS NOTES
CRITICAL CARE PROGRESS NOTES    PATIENT NAME: Elvira Hammond  MRN: [id number]  SERVICE DATE:  December 26, 2019   SERVICE TIME:  11:55 AM      PRIMARY SERVICE: Critical care medicine    CHIEF COMPLAINTS: Cough and dyspnea    INTERVAL HPI: Patient seen and examined at bedside, Interval Notes, orders reviewed. Discussed on multidisciplinary rounds  Doing a little better today continued with significant cough, coarse rhonchorous breathing, audible coarse breath sounds, periodically expectorating thick dark sputum tolerating pulmonary toileting measures fairly well. Remains very debilitated cachectic. OBJECTIVE    Body mass index is 17.47 kg/m². PHYSICAL EXAM:  Vitals:  BP (!) 158/81   Pulse 85   Temp 97.4 °F (36.3 °C) (Oral)   Resp 23   Ht 5' 5\" (1.651 m)   Wt 105 lb (47.6 kg)   SpO2 94%   BMI 17.47 kg/m²   General: Patient is currently alert, awake , cachectic, debilitated, mild increased work of breathing but better than she was yesterday  Head: Atraumatic , Normocephalic   Eyes: PERRL. No icteric sclera. No conjunctival injection. No discharge   ENT: No nasal  discharge. Pharynx clear. Neck:  Trachea midline. No thyromegaly, no JVD, No cervical adenopathy. Chest : Increased spontaneous breathing effort, symmetric bilateral excursions  Lung : Diminished breath sounds bilaterally, coarse rhonchi  Heart[de-identified] Regular rhythm and rate. No mumur ,  Rub or gallop  ABD: Benign. Non-tender. Non-distended. No masses or organmegaly. Normal bowel sounds. EXT: Improved pitting edema both lower extremities , No Cyanosis No clubbing  Neuro: no focal weakness  Skin: Warm and dry. No erythema or rash on exposed extremities.       DATA:   Recent Labs     12/25/19  0530 12/26/19  0509   WBC 6.4 6.0   HGB 8.3* 8.0*   HCT 26.6* 25.4*   MCV 94.8 93.5    260     Recent Labs     12/25/19  0530 12/26/19  0509   * 142   K 3.9 3.5   * 108*   CO2 16* 18*   BUN 37* 37*   CREATININE 2.81* 2.64*   GLUCOSE 71 97 CALCIUM 8.1* 8.0*   PROT 5.9* 5.6*   LABALBU 2.3* 2.4*   BILITOT <0.2 <0.2   ALKPHOS 62 58   AST 12 10   ALT 17 16   LABGLOM 16.5* 17.8*   GFRAA 20.0* 21.5*   GLOB 3.6* 3.2       MV Settings:     Vent Mode: CPAP  Vt Ordered: 400 mL  Rate Set: 16 bmp  FiO2 : 50 %  PEEP/CPAP: 5  Pressure Support: 10 cmH20  Peak Inspiratory Pressure: 19 cmH2O  Mean Airway Pressure: 8.69 cmH20  I:E Ratio: 1:4.00    No results for input(s): PHART, QIP6QOX, PO2ART, CDR2LWU, BEART, M9USRCTY in the last 72 hours. O2 Device: Heated high flow cannula  O2 Flow Rate (L/min): 40 L/min    DIET DYSPHAGIA MINCED AND MOIST; No Added Salt (3-4 GM);  Dysphagia Minced and Moist  Dietary Nutrition Supplements: Standard High Calorie Oral Supplement     MEDICATIONS during current hospitalization:    Continuous Infusions:   sodium chloride      propofol Stopped (12/24/19 1000)       Scheduled Meds:   lidocaine  5 mL Intradermal Once    sodium chloride flush  10 mL Intravenous 2 times per day    guaiFENesin  200 mg Oral Q4H    albuterol  2.5 mg Nebulization TID    chlorhexidine  15 mL Mouth/Throat BID    etomidate  10 mg Intravenous Once    miconazole   Topical BID    predniSONE  40 mg Oral Daily    pantoprazole  40 mg Intravenous BID    And    sodium chloride (PF)  10 mL Intravenous BID    amLODIPine  10 mg Oral Daily    atenolol  50 mg Oral BID    atorvastatin  40 mg Oral Nightly    isosorbide mononitrate  60 mg Oral Daily    budesonide  0.5 mg Nebulization BID    sodium chloride flush  10 mL Intravenous 2 times per day    sodium chloride (PF)  10 mL Intravenous Once    piperacillin-tazobactam  2.25 g Intravenous Q8H    sodium chloride  250 mL Intravenous Once    levetiracetam  500 mg Intravenous Q12H       PRN Meds:sodium chloride flush, nystatin, stomahesive in petrolatum, potassium chloride **OR** potassium alternative oral replacement **OR** potassium chloride, magnesium sulfate, hydrALAZINE, sodium chloride flush, magnesium

## 2019-12-26 NOTE — PROGRESS NOTES
Physical Therapy Med Surg Initial Assessment  [place] [facility] [place]  [place]: [place] [address]       NAME: Codi Walker  : 1950 (68 y.o.)  MRN: [id number]  CODE STATUS: Full Code    Date of Service: 2019    Patient Diagnosis(es): GI bleed [K92.2]   Chief Complaint   Patient presents with    Abnormal Lab     HGB 6.6 HCT 20.9     Patient Active Problem List    Diagnosis Date Noted    Severe malnutrition ([address] 2019    Acute blood loss anemia     Elevated INR     GI bleed 2019    Sepsis due to pneumonia ([address]     Pneumonia of both lower lobes due to Pseudomonas species ([address]     COPD exacerbation (HCC)     Pneumonia of left lower lobe due to infectious organism ([address]     Acute cholecystitis     Community acquired pneumonia     Mastoiditis of both sides     Seizure ([address]     Encephalopathy     Acute on chronic respiratory failure with hypoxia and hypercapnia ([address] 2019    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 ([address] 2017    Skin ulcer of left great toe, limited to breakdown of skin ([address] 2017    Peripheral vascular disease ([address] 2017    Paronychia of great toe, left 2017    Chronic embolism and thrombosis of vein 10/01/2016    Hyperthyroidism 2014    Asthma 2014    Hypercholesteremia 2014        Past Medical History:   Diagnosis Date    COPD (chronic obstructive pulmonary disease) ([address]     History of hemodialysis     Hx of blood clots     Hyperlipidemia     Hyperthyroidism      Past Surgical History:   Procedure Laterality Date    CATHETER REMOVAL N/A 2019    REMOVAL OF DIALYSIS CATHETER performed by Rancho Rothman MD at [facility]      CO COLON CA SCRN NOT  [address] IND N/A 11/15/2017    COLONOSCOPY performed by Anni Rios MD at [address] ESOPHAGOGASTRODUODENOSCOPY TRANSORAL DIAGNOSTIC N/A 11/15/2017    EGD ESOPHAGOGASTRODUODENOSCOPY WITH SMALL BOWEL BIOPSY performed by Zohreh Rivero MD at [facility] N/A 12/15/2019    EGD ESOPHAGOGASTRODUODENOSCOPY performed by Nataly Shahid MD at [address] [facility]      L leg DVT       Chart Reviewed: Yes  Patient assessed for rehabilitation services?: Yes  Family / Caregiver Present: Yes(S/O)  General Comment  Comments: Pt awake in bed, agreeable to PT eval. Finishing breathing tx. Restrictions:  Restrictions/Precautions: Fall Risk     SUBJECTIVE: Subjective: Pt on hi-flow 02, reports doing okay .   Pre Treatment Pain Screening  Pain at present: 0    Post Treatment Pain Screening:   Pain Screening  Patient Currently in Pain: Denies  Pain Assessment  Pain Level: 0    Prior Level of Function:  Social/Functional History  Lives With: Significant other  Type of Home: House  Home Layout: Two level  Home Access: Stairs to enter with rails  Entrance Stairs - Number of Steps: 4-5  Bathroom Shower/Tub: Tub/Shower unit  Bathroom Equipment: 3-in-1 commode  Home Equipment: Oxygen, Rolling walker, Cane  ADL Assistance: Independent  Homemaking Assistance: Independent  Ambulation Assistance: Independent(no AD typically)  Transfer Assistance: Independent  Active : No(SO mostly driving since July)  Additional Comments: Pt reports that she did not use any DME but it is available- information from chart, confirmed with pt; 1 fall at home    OBJECTIVE:   Vision/Hearing:  Vision: Impaired  Vision Exceptions: Wears glasses for reading  Hearing: Within functional limits    Cognition:  Overall Orientation Status: Within Functional Limits  Orientation Level: Oriented to person, Oriented to place, Oriented to situation  Follows Commands: Within Functional Limits    Observation/Palpation  Posture: Fair(kyphotic, rounded shoulders)    ROM:  RLE AROM: WFL  LLE AROM : WFL    Strength:  Strength RLE  Comment: grossly 3+/5  Strength LLE  Comment: grossly 3+/5    Neuro:  Balance  Sitting - Static: Good;-  Sitting - Dynamic: Good;-  Standing - Static: Fair;-  Standing - Dynamic: Poor(maintains static stand without UE support, unable to tolerate dynamic tasks without LOB)        Sensation  Overall Sensation Status: WFL    Bed mobility  Rolling to Right: Stand by assistance  Supine to Sit: Stand by assistance(use of bed rails)  Sit to Supine: Minimal assistance  Comment: increased time, significant effort; sat EOB about 5 minutes participating in reaching tasks. O2 down to 85% with activity, takes lengthy time to recover to 95%; RR up to 28/min    Transfers  Sit to Stand: Minimal Assistance(stood about 3 minutes, CGA-min A to maintain upright)  Stand to sit: Minimal Assistance  Bed to Chair: Unable to assess(did not progress to gait or pivot transfers due to poor tolerance to activity)    Ambulation  Ambulation?: No    Activity Tolerance  Activity Tolerance: Patient Tolerated treatment well  Activity Tolerance: SpO2 levels varying between 86-95%          ASSESSMENT:   Body structures, Functions, Activity limitations: Decreased functional mobility ; Decreased strength;Decreased endurance;Decreased balance;Decreased safe awareness  Decision Making: High Complexity  History: high  Exam: high  Clinical Presentation: high    Prognosis: Good    DISCHARGE RECOMMENDATIONS:  Discharge Recommendations: Continue to assess pending progress, Patient would benefit from continued therapy after discharge    Assessment: Pt with above deficits, worsened by decline in medical status and return to ICU. Pt recently extubated and was able to initiate mobility but with severe endurance deficits tolerating minimal activity. Continue PT to address impairments and return to highest practical level of mobility.   REQUIRES PT FOLLOW UP: Yes      PLAN OF CARE:  Plan  Times per week: 3-6  Current Treatment Recommendations: Strengthening, Functional Mobility Training, Neuromuscular

## 2019-12-26 NOTE — PROGRESS NOTES
repeat CXR with continued infiltrate     # COPD with chronic hypoxic respiratory failure  - continue duonebs, O2 therapy  - extubated on 12/24  - high flow o2 12/25 stable but with some increased work of breathing.      # CKD 4  - stable, monitor renal function     # DVT  - warfarin on hold due to GI bleed  - resume when OK with GI     # Severe protein calorie malnutrition  - encourage po intake     Disposition: Patient sp EGD 12/15 and still on PPI BID. GI following. Transferred to the ICU on 12/20 and intubated 12/21. Anticipated length of stay greater than 48 hours.     Electronically signed by Bev Bae MD on 12/26/2019 at 1:26 PM

## 2019-12-26 NOTE — PROGRESS NOTES
Hospitalist Progress Note      PCP: Alexx Graham MD    Date of Admission: 12/13/2019    Chief Complaint:  Pt seen and examined. No new complaints.   No nausea, vomiting, chest pain, or headache      Medications:  Reviewed    Infusion Medications    sodium chloride      propofol Stopped (12/24/19 1000)     Scheduled Medications    lidocaine  5 mL Intradermal Once    sodium chloride flush  10 mL Intravenous 2 times per day    guaiFENesin  200 mg Oral Q4H    albuterol  2.5 mg Nebulization TID    chlorhexidine  15 mL Mouth/Throat BID    etomidate  10 mg Intravenous Once    miconazole   Topical BID    predniSONE  40 mg Oral Daily    pantoprazole  40 mg Intravenous BID    And    sodium chloride (PF)  10 mL Intravenous BID    amLODIPine  10 mg Oral Daily    atenolol  50 mg Oral BID    atorvastatin  40 mg Oral Nightly    isosorbide mononitrate  60 mg Oral Daily    budesonide  0.5 mg Nebulization BID    sodium chloride flush  10 mL Intravenous 2 times per day    sodium chloride (PF)  10 mL Intravenous Once    piperacillin-tazobactam  2.25 g Intravenous Q8H    sodium chloride  250 mL Intravenous Once    levetiracetam  500 mg Intravenous Q12H     PRN Meds: sodium chloride flush, nystatin, stomahesive in petrolatum, potassium chloride **OR** potassium alternative oral replacement **OR** potassium chloride, magnesium sulfate, hydrALAZINE, sodium chloride flush, magnesium hydroxide, ondansetron, acetaminophen, albuterol      Intake/Output Summary (Last 24 hours) at 12/25/2019 2229  Last data filed at 12/25/2019 1737  Gross per 24 hour   Intake 620 ml   Output 1050 ml   Net -430 ml       Exam:    /63   Pulse 66   Temp 98.3 °F (36.8 °C) (Oral)   Resp 19   Ht 5' 5\" (1.651 m)   Wt 105 lb (47.6 kg)   SpO2 95%   BMI 17.47 kg/m²     Constitutional: sedated  Head: Normocephalic, atraumatic  Eyes: EOMI, PERRLA  ENT: moist mucous membranes, ETT in place  Neck: neck supple, trachea midline  Lungs: bilateral crackles and rhonchi, mild wheeze  Heart: RRR, normal S1 and S2, no murmurs  GI: Soft, non-distended, +BS  MSK: no edema noted  Pulses: 2+ pulses bilaterally  Skin: warm, dry         Labs:   Recent Labs     12/23/19  0510 12/23/19  0546 12/24/19  0533 12/25/19  0530   WBC 8.7  --  5.8 6.4   HGB 8.7* 8.6* 8.1* 8.3*   HCT 28.3*  --  25.9* 26.6*     --  212 250     Recent Labs     12/23/19  0510 12/23/19  0546 12/24/19  0533 12/25/19  0530     --  144 147*   K 3.4  --  3.3* 3.9   *  --  112* 114*   CO2 17*  --  18* 16*   BUN 31*  --  33* 37*   CREATININE 2.69* 2.2* 2.80* 2.81*   CALCIUM 7.8*  --  7.7* 8.1*   PHOS 4.8  --  6.0* 6.0*     Recent Labs     12/23/19  0510 12/24/19  0533 12/25/19  0530   AST 14 10 12   ALT 19 15 17   BILITOT <0.2 <0.2 <0.2   ALKPHOS 67 56 62     Recent Labs     12/23/19  0510 12/24/19  0533 12/25/19  0530   INR 1.0 1.0 1.1     No results for input(s): Sha Carlos in the last 72 hours. Urinalysis:      Lab Results   Component Value Date    NITRU Negative 12/13/2019    WBCUA >100 12/13/2019    BACTERIA Negative 12/13/2019    RBCUA 3-5 12/13/2019    BLOODU SMALL 12/13/2019    SPECGRAV 1.013 12/13/2019    GLUCOSEU Negative 12/13/2019       Radiology:  XR CHEST PORTABLE   Final Result      Interval improvement of bilateral pleural effusions. Trace pleural effusions remain. XR CHEST PORTABLE   Final Result   RESIDUAL PLEURAL EFFUSIONS AND ATELECTASIS RIGHT BASE. OVERALL SLIGHT IMPROVEMENT FROM ONE DAY EARLIER. TUBES AND CATHETERS SATISFACTORY. XR CHEST PORTABLE   Final Result      Stable lines and tubes. Bibasilar opacities and pleural effusions, with the findings at the left lung base appearing slightly improved from prior. XR CHEST PORTABLE   Final Result      Interval lines and tubes as detailed. Stable appearance of the lung bases. Emphysema. XR CHEST PORTABLE   Final Result      1.  NODULAR DENSITY IS NOW

## 2019-12-26 NOTE — CARE COORDINATION
Per the , the patient is on high flow. The patient is from [facility]. Donna Buitrago, admissions for [facility], is following the patient. Physician (per his note) will discuss reintubation and tracheostomy with patient and family.  Electronically signed by BALJINDER Casey on 12/26/19 at 3:38 PM

## 2019-12-26 NOTE — PROGRESS NOTES
7p-7a summary Patient has been on high flow O2 most of the shift. Only wore Bipap for a few hours. Moist productive cough noted at times. Buttocks red and excoriated zinc oxide oint applied. Repositioned for comfort.

## 2019-12-27 NOTE — PROGRESS NOTES
0800  - completed shift assessment. pt alert oriented but hard hearing. Daily care provided as needed and  Nystatin powder applied as ordered. Non productive cough noted. Pt tolerated breakfast and ate meal.    0945  - Rounds completed.  was at bedside. Dr Sandy Lozano spoke with  and patient regarding trach. Patient does not want to have a trach. Agrees to speak with hospice. 1200  Second assessment completed. Patient awake and tolerated coffee and ice cream. Refusing rest of lunch. Moist cough continues. 15:00  PT got her up on the chair. VS stable. Pt had a speech evaluation. Pt had coffee and sandwich. 16:00  Third assessment completed. Pt on the bed and she got a loose stool . Lana care provided as needed. Made her bed with new linen. 17:00  Pt and family members spoke with hospice nurse.

## 2019-12-27 NOTE — PROGRESS NOTES
Physical Therapy Med Surg Daily Treatment Note  [place]ility/Department[place] [facility] [place] [facility]  Room: [place] [address]       NAME: Codi Walker  : 1950 (68 y.o.)  MRN: [id number]  CODE STATUS: DNR-CC    Date of Service: 2019    Patient Diagnosis(es): GI bleed [K92.2]   Chief Complaint   Patient presents with    Abnormal Lab     HGB 6.6 HCT 20.9     Patient Active Problem List    Diagnosis Date Noted    Severe malnutrition ([address] 2019    Acute blood loss anemia     Elevated INR     GI bleed 2019    Sepsis due to pneumonia ([address]     Pneumonia of both lower lobes due to Pseudomonas species ([address]     COPD exacerbation (HCC)     Pneumonia of left lower lobe due to infectious organism ([address]     Acute cholecystitis     Community acquired pneumonia     Mastoiditis of both sides     Seizure ([address]     Encephalopathy     Acute on chronic respiratory failure with hypoxia and hypercapnia ([address] 2019    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 ([address] 2017    Skin ulcer of left great toe, limited to breakdown of skin ([address] 2017    Peripheral vascular disease ([address] 2017    Paronychia of great toe, left 2017    Chronic embolism and thrombosis of vein 10/01/2016    Hyperthyroidism 2014    Asthma 2014    Hypercholesteremia 2014        Past Medical History:   Diagnosis Date    COPD (chronic obstructive pulmonary disease) ([address]     History of hemodialysis     Hx of blood clots     Hyperlipidemia     Hyperthyroidism      Past Surgical History:   Procedure Laterality Date    CATHETER REMOVAL N/A 2019    REMOVAL OF DIALYSIS CATHETER performed by Rancho Rothman MD at [facility]      UT COLON CA SCRN NOT  [address] IND N/A 11/15/2017    COLONOSCOPY performed by Anni Rios MD at [address] ESOPHAGOGASTRODUODENOSCOPY TRANSORAL DIAGNOSTIC N/A 11/15/2017    EGD mobility  Long term goal 2: supervision with transfers  Long term goal 3: pt to ambulate 50 ft with supervision FWW  Long term goal 4: pt to tolerate >10 min standing dynamic activity   Patient Goals   Patient goals : agreeable to PT POC    Plan    Plan  Times per week: 3-6  Current Treatment Recommendations: Strengthening, Functional Mobility Training, Neuromuscular Re-education, Home Exercise Program, Equipment Evaluation, Education, & procurement, Transfer Training, Gait Training, Safety Education & Training, Balance Training, Endurance Training, Stair training, Patient/Caregiver Education & Training  Plan Comment: Cont.  POC  Safety Devices  Type of devices: Bed alarm in place, Call light within reach     [facility] (6 CLICK) [facility] [facility] Inpatient Mobility Raw Score : 16     Therapy Time   Individual   Time In  1500   Time Out 1515   Minutes 15   10 minutes bed mob/transfers  5 minutes gt          Moreno Ye PTA, 12/27/19 at 3:18 PM

## 2019-12-27 NOTE — PROGRESS NOTES
Hospitalist Progress Note      PCP: Jose Mack MD    Date of Admission: 12/13/2019    Chief Complaint:  Pt seen and examined. No new complaints.   No nausea, vomiting, chest pain, or headache      Medications:  Reviewed    Infusion Medications    sodium chloride       Scheduled Medications    lidocaine  5 mL Intradermal Once    sodium chloride flush  10 mL Intravenous 2 times per day    guaiFENesin  200 mg Oral Q4H    albuterol  2.5 mg Nebulization TID    etomidate  10 mg Intravenous Once    miconazole   Topical BID    predniSONE  40 mg Oral Daily    pantoprazole  40 mg Intravenous BID    And    sodium chloride (PF)  10 mL Intravenous BID    amLODIPine  10 mg Oral Daily    atenolol  50 mg Oral BID    atorvastatin  40 mg Oral Nightly    isosorbide mononitrate  60 mg Oral Daily    budesonide  0.5 mg Nebulization BID    sodium chloride flush  10 mL Intravenous 2 times per day    sodium chloride (PF)  10 mL Intravenous Once    piperacillin-tazobactam  2.25 g Intravenous Q8H    sodium chloride  250 mL Intravenous Once    levetiracetam  500 mg Intravenous Q12H     PRN Meds: morphine, sodium chloride flush, nystatin, stomahesive in petrolatum, potassium chloride **OR** potassium alternative oral replacement **OR** potassium chloride, magnesium sulfate, hydrALAZINE, sodium chloride flush, magnesium hydroxide, ondansetron, acetaminophen, albuterol      Intake/Output Summary (Last 24 hours) at 12/27/2019 1655  Last data filed at 12/27/2019 1500  Gross per 24 hour   Intake 2110 ml   Output 1475 ml   Net 635 ml       Exam:    BP (!) 158/82   Pulse 75   Temp 97.8 °F (36.6 °C)   Resp 23   Ht 5' 5\" (1.651 m)   Wt 114 lb 10.2 oz (52 kg)   SpO2 95%   BMI 19.08 kg/m²     Constitutional: sedated  Head: Normocephalic, atraumatic  Eyes: EOMI, PERRLA  ENT: moist mucous membranes, ETT in place  Neck: neck supple, trachea midline  Lungs: bilateral crackles and rhonchi, mild wheeze  Heart: RRR, normal S1 and S2, no murmurs  GI: Soft, non-distended, +BS  MSK: no edema noted  Pulses: 2+ pulses bilaterally  Skin: warm, dry         Labs:   Recent Labs     12/25/19 0530 12/26/19 0509 12/27/19 0516 12/27/19 0532 12/27/19 0536   WBC 6.4 6.0  --   --   --  7.6   HGB 8.3* 8.0*   < > 8.2* 8.1* 8.3*   HCT 26.6* 25.4*  --   --   --  27.1*    260  --   --   --  281    < > = values in this interval not displayed. Recent Labs     12/25/19 0530 12/26/19 0509 12/27/19 0516 12/27/19 0532 12/27/19 0536   * 142  --   --   --  142   K 3.9 3.5  --   --   --  3.7   * 108*  --   --   --  110*   CO2 16* 18*  --   --   --  16*   BUN 37* 37*  --   --   --  40*   CREATININE 2.81* 2.64*   < > 2.8* 3.1* 2.93*   CALCIUM 8.1* 8.0*  --   --   --  8.2*   PHOS 6.0* 5.2*  --   --   --  5.5*    < > = values in this interval not displayed. Recent Labs     12/25/19 0530 12/26/19 0509 12/27/19 0536   AST 12 10 10   ALT 17 16 15   BILITOT <0.2 <0.2 <0.2   ALKPHOS 62 58 55     Recent Labs     12/25/19 0530 12/26/19  0509 12/27/19 0536   INR 1.1 1.0 1.0     No results for input(s): Ike Garcia in the last 72 hours. Urinalysis:      Lab Results   Component Value Date    NITRU Negative 12/13/2019    WBCUA >100 12/13/2019    BACTERIA Negative 12/13/2019    RBCUA 3-5 12/13/2019    BLOODU SMALL 12/13/2019    SPECGRAV 1.013 12/13/2019    GLUCOSEU Negative 12/13/2019       Radiology:  XR CHEST PORTABLE   Final Result      Findings compatible with fluid overload/CHF. XR CHEST PORTABLE   Final Result      Interval improvement of bilateral pleural effusions. Trace pleural effusions remain. XR CHEST PORTABLE   Final Result   RESIDUAL PLEURAL EFFUSIONS AND ATELECTASIS RIGHT BASE. OVERALL SLIGHT IMPROVEMENT FROM ONE DAY EARLIER. TUBES AND CATHETERS SATISFACTORY. XR CHEST PORTABLE   Final Result      Stable lines and tubes.  Bibasilar opacities and pleural effusions, with the findings at

## 2019-12-27 NOTE — PROGRESS NOTES
[facility]  [place] [facility]  Speech Language Pathology   Treatment Note          Hammad Klein  1950  IC11/IC11-01    Medical Dx: GI bleed [K92.2]  Speech Dx: Dysphagia     12/27/2019    Subjective:  Alert, Cooperative and Pleasant        Interventions used this date:  Dysphagia Treatment    Objective/Assessment:  Patient progressing towards goals:  Short-term Goals  Timeframe for Short-term Goals: 1-2weeks  Goal 1: Pt will tolerate trials of dental soft and thin liquids with no overt s/s of aspiration   Goal 2: Pt will completed oral motor exercises to improve oral strentgh and coordination    Compensatory Swallowing Strategies: Alternate solids and liquids, Small bites/sips, Upright as possible for all oral intake, No straws, Eat/Feed slowly  Upon entering the room, pt sitting in chair eating dental soft food and thin liquids. SLP educated her on current recommended diet levels (minced/moist/thin liquids). RN present in room watching her eat. Unable to recall compensatory strategies. SLP reminded pt to take smaller bites and to alternate between solids and liquids. With min verbal cues, she was able to utilize these strategies with 100% accuracy. She tolerate dental soft foods in 5/6 trials with no overt s/s of aspiration observed. After one trial, pt demonstrated immediate cough. SLP educated her on sticking with current diet levels. Pt trialed thin liquids 6 x with no overt s/s of aspiration observed. Treatment/Activity Tolerance:  Patient tolerated treatment well    Plan:  Continue per POC    Pain:  Patient denies pain. Patient/Caregiver Education:  Patient educated on session and progression towards goals. Caregiver education on session and progress towards goals. Caregiver stated verbal understanding of directions.     Safety Devices:  Call light within reach and Chair alarm in place      Therapy Time  Time In: 7961  Time OCA:6693  Total Min: 14 min    Signature:

## 2019-12-27 NOTE — PLAN OF CARE
Nutrition Problem: Severe malnutrition, In context of chronic illness  Intervention: Food and/or Nutrient Delivery: Continue current diet, Continue current ONS(Minced and Moist, CORTES, thin liquids .  High Calorie ONS @ B & D (serive with cup of ice and milk for mixing()  Nutritional Goals: po > 50% , honor pt decision for comfort measures

## 2019-12-27 NOTE — PROGRESS NOTES
Nutrition Assessment    Type and Reason for Visit: Reassess    Nutrition Recommendations:   Minced and Moist, CORTES, thin liquids . High Calorie ONS @ B & D (serive with cup of ice and milk for mixing  Honor pt decision regarding comfort measures    Nutrition Assessment: Severe malnutrition continues related to chronic disease, pt now wishes to be [facility] with referral to hospice services    Malnutrition Assessment:  · Malnutrition Status: Meets the criteria for severe malnutrition  · Context: Chronic illness  · Findings of the 6 clinical characteristics of malnutrition (Minimum of 2 out of 6 clinical characteristics is required to make the diagnosis of moderate or severe Protein Calorie Malnutrition based on AND/ASPEN Guidelines):  1. Energy Intake-Less than or equal to 75% of estimated energy requirement, Greater than or equal to 1 month    2. Weight Loss-2% loss or greater((4.2%)), in 1 month  3. Fat Loss-Severe subcutaneous fat loss, Orbital, Fat overlying the ribs, Triceps  4. Muscle Loss-Severe muscle mass loss, Temples (temporalis muscle), Clavicles (pectoralis and deltoids), Scapula (trapezius)  5. Fluid Accumulation-No significant fluid accumulation,    6.  Strength-Not measured    Nutrition Risk Level: High    Nutrient Needs:  · Estimated Daily Total Kcal: 6926-5867 (32-35 kcal/kg)  · Estimated Daily Protein (g): 64-77 (1.5-1.8 g/kg)  · Estimated Daily Total Fluid (ml/day): ~1500 ml (1 ml/kcal)    Nutrition Diagnosis:   · Problem: Severe malnutrition, In context of chronic illness  · Etiology: related to Insufficient energy/nutrient consumption     Signs and symptoms:  as evidenced by Diet history of poor intake, BMI, Severe loss of subcutaneous fat, Severe muscle loss    Objective Information:  · Nutrition-Focused Physical Findings: high flow 02, 1-2+ generlized edema, incontinent, labs noted ( phos= 5.5), meds reviewd  · Wound Type:  Wound Consult Pending(Surigical incision on chest, redness on

## 2019-12-27 NOTE — PROGRESS NOTES
HGB 8.0*   < > 8.1* 8.3*   HCT 25.4*  --   --  27.1*   MCV 93.5  --   --  94.5     --   --  281    < > = values in this interval not displayed. Recent Labs     12/26/19  0509  12/27/19  0532 12/27/19  0536     --   --  142   K 3.5  --   --  3.7   *  --   --  110*   CO2 18*  --   --  16*   BUN 37*  --   --  40*   CREATININE 2.64*   < > 3.1* 2.93*   GLUCOSE 97  --   --  92   CALCIUM 8.0*  --   --  8.2*   PROT 5.6*  --   --  5.9*   LABALBU 2.4*  --   --  2.5*   BILITOT <0.2  --   --  <0.2   ALKPHOS 58  --   --  55   AST 10  --   --  10   ALT 16  --   --  15   LABGLOM 17.8*   < > 15* 15.8*   GFRAA 21.5*   < > 18* 19.1*   GLOB 3.2  --   --  3.4    < > = values in this interval not displayed. MV Settings:     Vent Mode: CPAP  Vt Ordered: 400 mL  Rate Set: 16 bmp  FiO2 : 80 %  PEEP/CPAP: 5  Pressure Support: 10 cmH20  Peak Inspiratory Pressure: 19 cmH2O  Mean Airway Pressure: 8.69 cmH20  I:E Ratio: 1:4.00    Recent Labs     12/27/19  0532   PHART 7.280*   HNB6WKV 48*   PO2ART 46*   FBE4KWI 22.3   BEART -5*   [id number] 76*       O2 Device: Heated high flow cannula  O2 Flow Rate (L/min): 60 L/min    DIET DYSPHAGIA MINCED AND MOIST; No Added Salt (3-4 GM);  Dysphagia Minced and Moist  Dietary Nutrition Supplements: Standard High Calorie Oral Supplement     MEDICATIONS during current hospitalization:    Continuous Infusions:   sodium chloride         Scheduled Meds:   lidocaine  5 mL Intradermal Once    sodium chloride flush  10 mL Intravenous 2 times per day    guaiFENesin  200 mg Oral Q4H    albuterol  2.5 mg Nebulization TID    etomidate  10 mg Intravenous Once    miconazole   Topical BID    predniSONE  40 mg Oral Daily    pantoprazole  40 mg Intravenous BID    And    sodium chloride (PF)  10 mL Intravenous BID    amLODIPine  10 mg Oral Daily    atenolol  50 mg Oral BID    atorvastatin  40 mg Oral Nightly    isosorbide mononitrate  60 mg Oral Daily    budesonide  0.5 mg

## 2019-12-28 NOTE — FLOWSHEET NOTE
1736 Pt transferred to inpatient hospice per [facility] cart pt put on non rebreather mask  pts male friend at bedside. Call to hospice to inform pt enroute.

## 2020-01-04 LAB
FINAL REPORT: NORMAL
PRELIMINARY: NORMAL

## 2020-02-04 NOTE — DISCHARGE SUMMARY
Hospital Medicine Discharge Summary    Frantz Hunter  :  1950  MRN:  [id number]    Admit date:  2019  Discharge date:     Admitting Physician:  No admitting provider for patient encounter. Primary Care Physician:  Shaniqua Schultz MD    Frantz Hunter is a 65 y.o. female that was admitted and treated at [facility] for the following medical issues: Active Problems:    GI bleed    Acute blood loss anemia    Elevated INR  Resolved Problems:    * No resolved hospital problems. *      Discharge Diagnoses: Active Problems:    GI bleed    Acute blood loss anemia    Elevated INR  Resolved Problems:    * No resolved hospital problems. *    Chief Complaint   Patient presents with    Abnormal Lab     HGB 6.6 HCT 20.9       Hospital Course:   Frantz Hunter is a 65 y.o. female who was admitted for gi bleed. Malnourished. Failure to thrive. dc'd to hospice. BP (!) 154/65   Pulse 64   Temp 97.6 °F (36.4 °C)   Resp 22   Ht 5' 5\" (1.651 m)   Wt 114 lb 10.2 oz (52 kg)   SpO2 100%   BMI 19.08 kg/m²     Patient was seen by the following consultants while admitted to [facility]:   Consults:  IP CONSULT TO GI  IP CONSULT TO PULMONOLOGY  IP CONSULT TO HOSPICE    Significant Diagnostic Studies:    Xr Chest Portable    Result Date: 2019  Portable chest radiograph History: Atelectasis and pleural effusions Technique: AP portable view of the chest obtained. Comparison: Chest x-ray from 2019 Findings: Atherosclerotic calcification of the thoracic aorta. The cardiomediastinal silhouette is within normal limits. Interval development of a moderate right pleural effusion and small left pleural effusion. Interval development of interstitial prominence likely related to interstitial pulmonary edema. Interval development of mild pulmonary vascular congestion. No pneumothorax. Bones of the thorax appear intact.      Findings compatible with fluid equals 90 day supply. Disposition:   If discharged to Home, Any [place] [phone] needs that were indicated and/or required as been addressed and set up by Social Work. Condition at discharge: Pt was medically stable at the time of discharge. Activity: activity as tolerated    Total time taken for discharging this patient: 40 minutes. Greater than 70% of time was spent focused exclusively on this patient. Time was taken to review chart, discuss plans with consultants, reconciling medications, discussing plan answering questions with patient.      Signed:  Martha Garza

## 2023-12-16 NOTE — PROGRESS NOTES
Ms. Emelia Cortez is a 69 y.o. y/o female with history of chronic embolism and thrombosis who presents today for anticoagulation monitoring and adjustment.   INR 2.0 is therapeutic for this patient (goal range 2-3) and is reflective of 32.5 mg TWD  Patient verifies current dosing regimen, patient able to verbally recall dose  Patient reports no  missed doses since last INR   Patient denies s/sx clotting and/or stroke  Patient denies hematuria, epistaxis, rectal bleeding  Patient denies changes in diet, alcohol, or tobacco use  Reviewed medication list and drug allergies with patient, updated any medication additions or modifications accordingly  Patient also denies any pending medical or dental procedures scheduled at this time  Patient was instructed to continue 32.5 mg TWD and RTC 3 weeks
No

## (undated) DEVICE — GLOVE SURG SZ 75 STD WHT LTX SYN POLYMER BEAD REINF ANTI RL

## (undated) DEVICE — SPONGE,LAP,18"X18",DLX,XR,ST,5/PK,40/PK: Brand: MEDLINE

## (undated) DEVICE — LABEL MED MINI W/ MARKER

## (undated) DEVICE — YANKAUER,BULB TIP,W/O VENT,RIGID,STERILE: Brand: MEDLINE

## (undated) DEVICE — SONY PRINTER PAPER

## (undated) DEVICE — PAD,ABDOMINAL,8"X10",ST,LF: Brand: MEDLINE

## (undated) DEVICE — GAUZE,SPONGE,FLUFF,6"X6.75",STRL,10/TRAY: Brand: MEDLINE

## (undated) DEVICE — GOWN,AURORA,NONREINFORCED,LARGE: Brand: MEDLINE

## (undated) DEVICE — Device: Brand: ENDO SMARTCAP

## (undated) DEVICE — TOWEL,OR,DSP,ST,BLUE,STD,4/PK,20PK/CS: Brand: MEDLINE

## (undated) DEVICE — GLOVE ORANGE PI 8 1/2   MSG9085

## (undated) DEVICE — SUTURE VCRL SZ 2-0 L18IN ABSRB VLT MO-6 L26MM 1/2 CIR J789D

## (undated) DEVICE — INTENDED FOR TISSUE SEPARATION, AND OTHER PROCEDURES THAT REQUIRE A SHARP SURGICAL BLADE TO PUNCTURE OR CUT.: Brand: BARD-PARKER ® CARBON RIB-BACK BLADES

## (undated) DEVICE — BRUSH ENDO CLN L90.5IN SHTH DIA1.7MM BRIST DIA5-7MM 2-6MM

## (undated) DEVICE — TRAY PREP DRY W/ PREM GLV 2 APPL 6 SPNG 2 UNDPD 1 OVERWRAP

## (undated) DEVICE — TUBE SET 96 MM 64 MM H2O PERISTALTIC STD AUX CHANNEL

## (undated) DEVICE — PENCIL SMOKE EVAC PUSH BUTTON COATED

## (undated) DEVICE — ENDO CARRY-ON PROCEDURE KIT: Brand: ENDO CARRY-ON PROCEDURE KIT

## (undated) DEVICE — PACK,LAPAROTOMY,NO GOWNS: Brand: MEDLINE

## (undated) DEVICE — 4-PORT MANIFOLD: Brand: NEPTUNE 2

## (undated) DEVICE — ADAPTER FLSH PMP FLD MGMT GI IRRIG OFP 2 DISPOSABLE

## (undated) DEVICE — ELECTRODE PT RET AD L9FT HI MOIST COND ADH HYDRGEL CORDED

## (undated) DEVICE — TUBING, SUCTION, 1/4" X 10', STRAIGHT: Brand: MEDLINE

## (undated) DEVICE — MARKER SURG SKIN GENTIAN VLT REG TIP W/ 6IN RUL

## (undated) DEVICE — GOWN,AURORA,NONRNF,XL,30/CS: Brand: MEDLINE

## (undated) DEVICE — COUNTER NDL 40 COUNT HLD 70 FOAM BLK ADH W/ MAG

## (undated) DEVICE — CONMED SCOPE SAVER BITE BLOCK, 20X27 MM: Brand: SCOPE SAVER